# Patient Record
Sex: FEMALE | Race: WHITE | Employment: UNEMPLOYED | URBAN - METROPOLITAN AREA
[De-identification: names, ages, dates, MRNs, and addresses within clinical notes are randomized per-mention and may not be internally consistent; named-entity substitution may affect disease eponyms.]

---

## 2017-03-01 ENCOUNTER — TELEPHONE (OUTPATIENT)
Dept: ORTHOPEDIC SURGERY | Age: 67
End: 2017-03-01

## 2017-03-23 DIAGNOSIS — M25.511 CHRONIC RIGHT SHOULDER PAIN: Primary | ICD-10-CM

## 2017-03-23 DIAGNOSIS — G89.29 CHRONIC RIGHT SHOULDER PAIN: Primary | ICD-10-CM

## 2017-03-25 ENCOUNTER — OFFICE VISIT (OUTPATIENT)
Dept: ORTHOPEDIC SURGERY | Age: 67
End: 2017-03-25

## 2017-03-25 VITALS
BODY MASS INDEX: 30.16 KG/M2 | WEIGHT: 199 LBS | HEIGHT: 68 IN | DIASTOLIC BLOOD PRESSURE: 83 MMHG | SYSTOLIC BLOOD PRESSURE: 155 MMHG | HEART RATE: 69 BPM

## 2017-03-25 DIAGNOSIS — Z96.611 S/P REVERSE TOTAL SHOULDER ARTHROPLASTY, RIGHT: Primary | ICD-10-CM

## 2017-03-25 PROCEDURE — 99024 POSTOP FOLLOW-UP VISIT: CPT | Performed by: PHYSICIAN ASSISTANT

## 2017-03-31 ENCOUNTER — OFFICE VISIT (OUTPATIENT)
Dept: ORTHOPEDIC SURGERY | Age: 67
End: 2017-03-31

## 2017-03-31 VITALS — HEIGHT: 68 IN | BODY MASS INDEX: 30.17 KG/M2 | WEIGHT: 199.08 LBS

## 2017-03-31 DIAGNOSIS — Z96.611 STATUS POST REVERSE TOTAL REPLACEMENT OF RIGHT SHOULDER: ICD-10-CM

## 2017-03-31 PROCEDURE — 73030 X-RAY EXAM OF SHOULDER: CPT | Performed by: ORTHOPAEDIC SURGERY

## 2017-03-31 PROCEDURE — 99024 POSTOP FOLLOW-UP VISIT: CPT | Performed by: ORTHOPAEDIC SURGERY

## 2017-03-31 RX ORDER — CLARITHROMYCIN 500 MG/1
TABLET, COATED ORAL
Refills: 0 | COMMUNITY
Start: 2017-03-27 | End: 2021-03-03 | Stop reason: ALTCHOICE

## 2017-04-03 ENCOUNTER — HOSPITAL ENCOUNTER (OUTPATIENT)
Dept: PHYSICAL THERAPY | Age: 67
Discharge: HOME | End: 2017-04-03
Admitting: ORTHOPAEDIC SURGERY

## 2017-04-03 ENCOUNTER — HOSPITAL ENCOUNTER (OUTPATIENT)
Dept: PHYSICAL THERAPY | Age: 67
Discharge: HOME | End: 2017-03-31
Admitting: ORTHOPAEDIC SURGERY

## 2017-04-06 ENCOUNTER — HOSPITAL ENCOUNTER (OUTPATIENT)
Dept: PHYSICAL THERAPY | Age: 67
Discharge: HOME | End: 2017-04-06
Admitting: ORTHOPAEDIC SURGERY

## 2017-04-10 ENCOUNTER — HOSPITAL ENCOUNTER (OUTPATIENT)
Dept: PHYSICAL THERAPY | Age: 67
Discharge: HOME | End: 2017-04-10
Admitting: ORTHOPAEDIC SURGERY

## 2017-04-13 ENCOUNTER — HOSPITAL ENCOUNTER (OUTPATIENT)
Dept: PHYSICAL THERAPY | Age: 67
Discharge: HOME | End: 2017-04-13
Admitting: ORTHOPAEDIC SURGERY

## 2017-04-17 ENCOUNTER — HOSPITAL ENCOUNTER (OUTPATIENT)
Dept: PHYSICAL THERAPY | Age: 67
Discharge: HOME | End: 2017-04-17
Admitting: ORTHOPAEDIC SURGERY

## 2017-04-20 ENCOUNTER — HOSPITAL ENCOUNTER (OUTPATIENT)
Dept: PHYSICAL THERAPY | Age: 67
Discharge: HOME | End: 2017-04-20
Admitting: ORTHOPAEDIC SURGERY

## 2017-04-24 ENCOUNTER — HOSPITAL ENCOUNTER (OUTPATIENT)
Dept: PHYSICAL THERAPY | Age: 67
Discharge: HOME | End: 2017-04-24
Admitting: ORTHOPAEDIC SURGERY

## 2017-04-27 ENCOUNTER — HOSPITAL ENCOUNTER (OUTPATIENT)
Dept: PHYSICAL THERAPY | Age: 67
Discharge: HOME | End: 2017-04-27
Admitting: ORTHOPAEDIC SURGERY

## 2017-05-01 ENCOUNTER — HOSPITAL ENCOUNTER (OUTPATIENT)
Dept: PHYSICAL THERAPY | Age: 67
Discharge: HOME | End: 2017-05-01
Admitting: ORTHOPAEDIC SURGERY

## 2017-05-04 ENCOUNTER — HOSPITAL ENCOUNTER (OUTPATIENT)
Dept: PHYSICAL THERAPY | Age: 67
Discharge: HOME | End: 2017-05-04
Admitting: ORTHOPAEDIC SURGERY

## 2017-05-08 ENCOUNTER — HOSPITAL ENCOUNTER (OUTPATIENT)
Dept: PHYSICAL THERAPY | Age: 67
Discharge: HOME | End: 2017-05-08
Admitting: ORTHOPAEDIC SURGERY

## 2017-05-11 ENCOUNTER — HOSPITAL ENCOUNTER (OUTPATIENT)
Dept: PHYSICAL THERAPY | Age: 67
Discharge: HOME | End: 2017-05-11
Admitting: ORTHOPAEDIC SURGERY

## 2017-05-15 ENCOUNTER — HOSPITAL ENCOUNTER (OUTPATIENT)
Dept: PHYSICAL THERAPY | Age: 67
Discharge: HOME | End: 2017-05-15
Admitting: ORTHOPAEDIC SURGERY

## 2017-05-16 ENCOUNTER — OFFICE VISIT (OUTPATIENT)
Dept: ORTHOPEDIC SURGERY | Age: 67
End: 2017-05-16

## 2017-05-16 VITALS — BODY MASS INDEX: 30.17 KG/M2 | HEIGHT: 68 IN | WEIGHT: 199.08 LBS

## 2017-05-16 DIAGNOSIS — Z96.611 STATUS POST REVERSE TOTAL REPLACEMENT OF RIGHT SHOULDER: Primary | ICD-10-CM

## 2017-05-16 PROCEDURE — 99024 POSTOP FOLLOW-UP VISIT: CPT | Performed by: ORTHOPAEDIC SURGERY

## 2017-05-16 RX ORDER — TRAMADOL HYDROCHLORIDE 50 MG/1
50 TABLET ORAL EVERY 6 HOURS PRN
Qty: 30 TABLET | Refills: 0 | Status: SHIPPED | OUTPATIENT
Start: 2017-05-16 | End: 2017-05-26

## 2017-05-18 ENCOUNTER — HOSPITAL ENCOUNTER (OUTPATIENT)
Dept: PHYSICAL THERAPY | Age: 67
Discharge: HOME | End: 2017-05-18
Admitting: ORTHOPAEDIC SURGERY

## 2017-05-22 ENCOUNTER — HOSPITAL ENCOUNTER (OUTPATIENT)
Dept: PHYSICAL THERAPY | Age: 67
Discharge: HOME | End: 2017-05-22
Admitting: ORTHOPAEDIC SURGERY

## 2017-05-25 ENCOUNTER — HOSPITAL ENCOUNTER (OUTPATIENT)
Dept: PHYSICAL THERAPY | Age: 67
Discharge: HOME | End: 2017-05-25
Admitting: ORTHOPAEDIC SURGERY

## 2017-05-25 DIAGNOSIS — Z96.611 STATUS POST REVERSE TOTAL REPLACEMENT OF RIGHT SHOULDER: Primary | ICD-10-CM

## 2017-05-25 RX ORDER — AMOXICILLIN 500 MG/1
CAPSULE ORAL
Qty: 4 CAPSULE | Refills: 0 | Status: SHIPPED | OUTPATIENT
Start: 2017-05-25 | End: 2017-12-07 | Stop reason: ALTCHOICE

## 2017-05-31 ENCOUNTER — HOSPITAL ENCOUNTER (OUTPATIENT)
Dept: PHYSICAL THERAPY | Age: 67
Discharge: HOME | End: 2017-05-31
Admitting: ORTHOPAEDIC SURGERY

## 2017-06-07 ENCOUNTER — HOSPITAL ENCOUNTER (OUTPATIENT)
Dept: PHYSICAL THERAPY | Age: 67
Discharge: HOME | End: 2017-06-07
Admitting: ORTHOPAEDIC SURGERY

## 2017-06-09 ENCOUNTER — HOSPITAL ENCOUNTER (OUTPATIENT)
Dept: PHYSICAL THERAPY | Age: 67
Discharge: HOME | End: 2017-06-09
Admitting: ORTHOPAEDIC SURGERY

## 2017-06-12 ENCOUNTER — HOSPITAL ENCOUNTER (OUTPATIENT)
Dept: PHYSICAL THERAPY | Age: 67
Discharge: HOME | End: 2017-06-12
Admitting: ORTHOPAEDIC SURGERY

## 2017-06-14 ENCOUNTER — HOSPITAL ENCOUNTER (OUTPATIENT)
Dept: PHYSICAL THERAPY | Age: 67
Discharge: HOME | End: 2017-06-14
Admitting: ORTHOPAEDIC SURGERY

## 2017-07-21 ENCOUNTER — OFFICE VISIT (OUTPATIENT)
Dept: ORTHOPEDIC SURGERY | Age: 67
End: 2017-07-21

## 2017-07-21 DIAGNOSIS — M25.512 CHRONIC LEFT SHOULDER PAIN: ICD-10-CM

## 2017-07-21 DIAGNOSIS — M25.511 RIGHT SHOULDER PAIN, UNSPECIFIED CHRONICITY: Primary | ICD-10-CM

## 2017-07-21 DIAGNOSIS — G89.29 CHRONIC LEFT SHOULDER PAIN: ICD-10-CM

## 2017-07-21 DIAGNOSIS — Z96.611 STATUS POST REVERSE TOTAL REPLACEMENT OF RIGHT SHOULDER: ICD-10-CM

## 2017-07-21 DIAGNOSIS — M25.512 LEFT SHOULDER PAIN, UNSPECIFIED CHRONICITY: ICD-10-CM

## 2017-07-21 PROCEDURE — 1123F ACP DISCUSS/DSCN MKR DOCD: CPT | Performed by: PHYSICIAN ASSISTANT

## 2017-07-21 PROCEDURE — 3017F COLORECTAL CA SCREEN DOC REV: CPT | Performed by: PHYSICIAN ASSISTANT

## 2017-07-21 PROCEDURE — 4040F PNEUMOC VAC/ADMIN/RCVD: CPT | Performed by: PHYSICIAN ASSISTANT

## 2017-07-21 PROCEDURE — 20610 DRAIN/INJ JOINT/BURSA W/O US: CPT | Performed by: PHYSICIAN ASSISTANT

## 2017-07-21 PROCEDURE — G8400 PT W/DXA NO RESULTS DOC: HCPCS | Performed by: PHYSICIAN ASSISTANT

## 2017-07-21 PROCEDURE — 73030 X-RAY EXAM OF SHOULDER: CPT | Performed by: PHYSICIAN ASSISTANT

## 2017-07-21 PROCEDURE — 99213 OFFICE O/P EST LOW 20 MIN: CPT | Performed by: PHYSICIAN ASSISTANT

## 2017-07-21 PROCEDURE — 1036F TOBACCO NON-USER: CPT | Performed by: PHYSICIAN ASSISTANT

## 2017-07-21 PROCEDURE — G8427 DOCREV CUR MEDS BY ELIG CLIN: HCPCS | Performed by: PHYSICIAN ASSISTANT

## 2017-07-21 PROCEDURE — 3014F SCREEN MAMMO DOC REV: CPT | Performed by: PHYSICIAN ASSISTANT

## 2017-07-21 PROCEDURE — 1090F PRES/ABSN URINE INCON ASSESS: CPT | Performed by: PHYSICIAN ASSISTANT

## 2017-07-21 PROCEDURE — G8417 CALC BMI ABV UP PARAM F/U: HCPCS | Performed by: PHYSICIAN ASSISTANT

## 2017-11-28 ENCOUNTER — OFFICE VISIT (OUTPATIENT)
Dept: ORTHOPEDIC SURGERY | Age: 67
End: 2017-11-28

## 2017-11-28 VITALS — WEIGHT: 199.08 LBS | HEIGHT: 68 IN | BODY MASS INDEX: 30.17 KG/M2

## 2017-11-28 DIAGNOSIS — Z96.611 STATUS POST REVERSE TOTAL REPLACEMENT OF RIGHT SHOULDER: Primary | ICD-10-CM

## 2017-11-28 DIAGNOSIS — M19.019 SHOULDER ARTHRITIS: ICD-10-CM

## 2017-11-28 PROCEDURE — 99213 OFFICE O/P EST LOW 20 MIN: CPT | Performed by: ORTHOPAEDIC SURGERY

## 2017-11-28 PROCEDURE — 1123F ACP DISCUSS/DSCN MKR DOCD: CPT | Performed by: ORTHOPAEDIC SURGERY

## 2017-11-28 PROCEDURE — G8417 CALC BMI ABV UP PARAM F/U: HCPCS | Performed by: ORTHOPAEDIC SURGERY

## 2017-11-28 PROCEDURE — 4040F PNEUMOC VAC/ADMIN/RCVD: CPT | Performed by: ORTHOPAEDIC SURGERY

## 2017-11-28 PROCEDURE — 1090F PRES/ABSN URINE INCON ASSESS: CPT | Performed by: ORTHOPAEDIC SURGERY

## 2017-11-28 PROCEDURE — G8484 FLU IMMUNIZE NO ADMIN: HCPCS | Performed by: ORTHOPAEDIC SURGERY

## 2017-11-28 PROCEDURE — 3014F SCREEN MAMMO DOC REV: CPT | Performed by: ORTHOPAEDIC SURGERY

## 2017-11-28 PROCEDURE — G8427 DOCREV CUR MEDS BY ELIG CLIN: HCPCS | Performed by: ORTHOPAEDIC SURGERY

## 2017-11-28 PROCEDURE — G8400 PT W/DXA NO RESULTS DOC: HCPCS | Performed by: ORTHOPAEDIC SURGERY

## 2017-11-28 PROCEDURE — 1036F TOBACCO NON-USER: CPT | Performed by: ORTHOPAEDIC SURGERY

## 2017-11-28 PROCEDURE — 3017F COLORECTAL CA SCREEN DOC REV: CPT | Performed by: ORTHOPAEDIC SURGERY

## 2017-11-28 PROCEDURE — 20610 DRAIN/INJ JOINT/BURSA W/O US: CPT | Performed by: ORTHOPAEDIC SURGERY

## 2017-11-28 RX ORDER — CLINDAMYCIN HYDROCHLORIDE 300 MG/1
CAPSULE ORAL
Qty: 2 CAPSULE | Refills: 0 | Status: SHIPPED | OUTPATIENT
Start: 2017-11-28 | End: 2021-03-03 | Stop reason: ALTCHOICE

## 2017-11-28 NOTE — PROGRESS NOTES
HISTORY OF PRESENT ILLNESS: The patient returns today for evaluation of her left shoulder. She has known glenohumeral arthritis and has had improvement in the past following corticosteroid injections. He would like another injection as she is scheduled for a knee replacement with Dr. João Duran in December. REVIEW OF SYSTEMS: Pertinent items are noted in the HPI. Review of symptoms reviewed from the Patient History Form and dated on 11/28/19 are available in the patient's chart under the Media tab. PHYSICAL EXAMINATION: Inspection of the affected left shoulder reveals warm, dry, intact skin. Previous portal sites from her rotator cuff repair are healed. There is no adenopathy. The distal neurovascular exam is grossly intact. There is no glenohumeral instability. She has 135° of comfortable active forward elevation. There is some mild glenohumeral crepitance. She has 5 minus out of 5 forward elevation and external rotation strength. There is no glenohumeral instability. Examination of the contralateral shoulder reveals no atrophy or deformity. The skin is warm and dry. The incision from her reverse shoulder arthroplasty is well-healed. She has 145° of active forward elevation. He has 5 minus out of 5 forward elevation and 4+ out of 5 external rotation strength. The distal neurovascular exam is grossly intact. Cervical spine: The skin is warm and dry. There is no swelling, warmth, or erythema. Range of motion is within normal limits. There is no paraspinal or spinous process tenderness. Spurling's sign is negative and did not produce shoulder pain. The distal neurovascular exam is grossly intact. X-RAYS: 4 views left shoulder obtained in the office and reviewed today. There is evidence of glenohumeral arthritis. There is evidence of a previous rotator cuff repair with acromioplasty.     ASSESSMENT:  Left shoulder glenohumeral arthritis    PROCEDURE:  After informed consent was provided, the posterior aspect of the left shoulder was prepped with Betadine and alcohol. The skin was prepped with Betadine and alcohol. A 25 gauge needle was inserted into the glenohumeral joint. Intraarticular placement was confirmed with gentle humeral rotation. A mixture of 4 mL of 1% lidocaine, 4 mL of 0.25% marcaine and 2 mL of celestone was injected. The needle was withdrawn and the puncture site sealed with a Band-Aid. The patient tolerated the procedure well. PLAN:  Hopefully the injection will improve her symptoms. She'll modify her activities appropriately. I'll reevaluate her at her request.  She understands she may eventually benefit from shoulder arthroplasty. She agreed with our plan.

## 2017-12-07 RX ORDER — AMOXICILLIN 500 MG/1
CAPSULE ORAL
Qty: 8 CAPSULE | Refills: 0 | Status: SHIPPED | OUTPATIENT
Start: 2017-12-07 | End: 2021-08-23

## 2018-02-01 ENCOUNTER — HOSPITAL ENCOUNTER (OUTPATIENT)
Dept: PHYSICAL THERAPY | Age: 68
Discharge: HOME | End: 2018-02-28
Attending: ORTHOPAEDIC SURGERY | Admitting: ORTHOPAEDIC SURGERY

## 2018-02-02 ENCOUNTER — HOSPITAL ENCOUNTER (OUTPATIENT)
Dept: PHYSICAL THERAPY | Age: 68
Discharge: HOME | End: 2018-02-03
Admitting: ORTHOPAEDIC SURGERY

## 2018-02-02 ENCOUNTER — HOSPITAL ENCOUNTER (OUTPATIENT)
Dept: PHYSICAL THERAPY | Age: 68
Discharge: HOME | End: 2018-01-31
Admitting: ORTHOPAEDIC SURGERY

## 2018-02-05 ENCOUNTER — HOSPITAL ENCOUNTER (OUTPATIENT)
Dept: PHYSICAL THERAPY | Age: 68
Discharge: HOME | End: 2018-02-06
Admitting: ORTHOPAEDIC SURGERY

## 2018-02-05 NOTE — FLOWSHEET NOTE
Rick , [facility]    Physical Therapy Daily Treatment Note  Date:  2018    Patient Name:  Kirill Martínez    :  1950  MRN: [id number]  Restrictions/Precautions:    Medical/Treatment Diagnosis Information:  · Diagnosis: Primary OA of R knee (M17.11)  · Treatment Diagnosis: M17.11 (Primary OA of R knee)  Insurance/Certification information:  PT Insurance Information: Medicare  Physician Information:  Referring Practitioner: Dr. Sharmila Timmons of care signed (Y/N):     Date of Patient follow up with Physician:     G-Code (if applicable):      Date G-Code Applied:         Progress Note: [x]  Yes  []  No  Next due by: Visit #10       Latex Allergy:  [x]NO      []YES  Preferred Language for Healthcare:   [x]English       []other:    Visit # Insurance Allowable   2 MN (medicare)     Pain level:  2/10 at rest      SUBJECTIVE:  Pt states knee feels stiff and swollen    OBJECTIVE:   Observation:   Test measurements:  PROM R knee 0-105    RESTRICTIONS/PRECAUTIONS: none    Exercises/Interventions:     Therapeutic Ex 28' Resistance Sets/sec Reps Notes   Retro Stepper/BIKE  6'     Quad sets  5\" 20    3 way SLR  3 10 flexion   SAQ  3 10    Clam ABD       Heel slides   10\" 10    Bosu fwd/side lunge       Slide Lunge       Leg Press Ecc 0-        HS curl orange 2 10    TKE       Glute side walks       BOSU squat       Calf stretch  30\" 3                  Manual Intervention 12'       Knee mobs/PROM  10'     Tib/Fem Mobs       Patella Mobs  2'     Ankle mobs                     NMR re-education       Jamaican/Biofeedback 10/10       G. Med activaiton/sidelying       G. Max Activation/prone       Hip Ext full ROM G.  Activation       Bosu Bal and Prop- G Med       Single leg stance/Balance/Prop       HR/TR       Tandem balance  1' 3               Therapeutic Exercise and NMR EXR  [x] (69556) Provided verbal/tactile cueing for activities related to strengthening, of care initiated [] Hold pending MD visit [] Discharge    Electronically signed by: Víctor Harman PTA

## 2018-02-07 ENCOUNTER — HOSPITAL ENCOUNTER (OUTPATIENT)
Dept: PHYSICAL THERAPY | Age: 68
Discharge: HOME | End: 2018-02-08
Admitting: ORTHOPAEDIC SURGERY

## 2018-02-07 NOTE — FLOWSHEET NOTE
Rick [address], [facility]    Physical Therapy Daily Treatment Note  Date:  2018    Patient Name:  Sigifredo Lilly    :  1950  MRN: [id number]  Restrictions/Precautions:    Medical/Treatment Diagnosis Information:  · Diagnosis: Primary OA of R knee (M17.11)  · Treatment Diagnosis: M17.11 (Primary OA of R knee)  Insurance/Certification information:  PT Insurance Information: Medicare  Physician Information:  Referring Practitioner: Dr. Negro Gustafson of care signed (Y/N):     Date of Patient follow up with Physician:     G-Code (if applicable):      Date G-Code Applied:         Progress Note: [x]  Yes  []  No  Next due by: Visit #10       Latex Allergy:  [x]NO      []YES  Preferred Language for Healthcare:   [x]English       []other:    Visit # Insurance Allowable   3 [place] (medicare)     Pain level:  2/10 at rest      SUBJECTIVE:  Pt states knee continues to have moments when it feels really good and flexible, then other times where if feels more stiff and swollen    OBJECTIVE:   Observation:   Test measurements:  PROM R knee 0-107    RESTRICTIONS/PRECAUTIONS: none    Exercises/Interventions:     Therapeutic Ex 33' Resistance Sets/sec Reps Notes   Retro Stepper/BIKE  6'  3/4 revolutions   Quad sets  5\" 20    3 way SLR  3 10 Flexion/abd   SAQ  3 10    Clam ABD orange 2 10 hooklying   Heel slides   10\" 10    SB rolls       Slide Lunge       Leg Press Ecc 0-        HS curl/LAQ orange 2 10    TKE       Glute side walks       BOSU squat       Calf stretch  30\" 3                  Manual Intervention 12'       Knee mobs/PROM  10'     Tib/Fem Mobs       Patella Mobs  2'     Ankle mobs                     NMR re-education       Filipino/Biofeedback 10/10       G. Med activaiton/sidelying       G. Max Activation/prone       Hip Ext full ROM G.  Activation       Bosu Bal and Prop- G Med       Single leg stance/Balance/Prop       HR/TR       Tandem balance  1' 3 Therapeutic Exercise and NMR EXR  [x] (50322) Provided verbal/tactile cueing for activities related to strengthening, flexibility, endurance, ROM for improvements in LE, proximal hip, and core control with self care, mobility, lifting, ambulation.  [] (84742) Provided verbal/tactile cueing for activities related to improving balance, coordination, kinesthetic sense, posture, motor skill, proprioception  to assist with LE, proximal hip, and core control in self care, mobility, lifting, ambulation and eccentric single leg control.      NMR and Therapeutic Activities:    [] (58720 or 36293) Provided verbal/tactile cueing for activities related to improving balance, coordination, kinesthetic sense, posture, motor skill, proprioception and motor activation to allow for proper function of core, proximal hip and LE with self care and ADLs  [] (32355) Gait Re-education- Provided training and instruction to the patient for proper LE, core and proximal hip recruitment and positioning and eccentric body weight control with ambulation re-education including up and down stairs     Home Exercise Program:    [x] (20630) Reviewed/Progressed HEP activities related to strengthening, flexibility, endurance, ROM of core, proximal hip and LE for functional self-care, mobility, lifting and ambulation/stair navigation   [] (00722)Reviewed/Progressed HEP activities related to improving balance, coordination, kinesthetic sense, posture, motor skill, proprioception of core, proximal hip and LE for self care, mobility, lifting, and ambulation/stair navigation      Manual Treatments:  PROM / STM / Oscillations-Mobs:  G-I, II, III, IV (PA's, Inf., Post.)  [] (32345) Provided manual therapy to mobilize LE, proximal hip and/or LS spine soft tissue/joints for the purpose of modulating pain, promoting relaxation,  increasing ROM, reducing/eliminating soft tissue swelling/inflammation/restriction, improving soft tissue extensibility and

## 2018-02-12 ENCOUNTER — HOSPITAL ENCOUNTER (OUTPATIENT)
Dept: PHYSICAL THERAPY | Age: 68
Discharge: HOME | End: 2018-02-13
Admitting: ORTHOPAEDIC SURGERY

## 2018-02-12 NOTE — FLOWSHEET NOTE
for normal function with self care, mobility, lifting and ambulation. Modalities:  Trial of HV/CP x 15'    Charges:  Timed Code Treatment Minutes: 45   Total Treatment Minutes: 60     [] EVAL  [x] DQ(71628) x  2   [] IONTO  [] NMR (37597) x      [] VASO  [x] Manual (95299) x  1    [] Other:  [] TA x       [] Mech Traction (64089)  [] ES(attended) (05371)      [x] ES (un) (28048):     GOALS:   Patient stated goal: \"back to normal activities\"    Therapist goals for Patient:   Short Term Goals: To be achieved in: 2 weeks  1. Independent in HEP and progression per patient tolerance, in order to prevent re-injury. 2. Patient will have a decrease in pain to facilitate improvement in movement, function, and ADLs as indicated by Functional Deficits. Long Term Goals: To be achieved in: 12 weeks  1. Disability index score of 25% or less for the LEFS to assist with reaching prior level of function. 2. Patient will demonstrate increased AROM to WNL to allow for proper joint functioning as indicated by patients Functional Deficits. 3. Patient will demonstrate an increase in Strength to WNL to allow for proper functional mobility as indicated by patients Functional Deficits. 4. Patient will return to all functional activities without increased symptoms or restriction. 5. Patient will be able to walk 1 mile without AD without pain in R knee. Progression Towards Functional goals:  [x] Patient is progressing as expected towards functional goals listed. [] Progression is slowed due to complexities listed. [] Progression has been slowed due to co-morbidities. [] Plan just implemented, too soon to assess goals progression  [] Other:     ASSESSMENT: Pt encouraged to decrease amount of walking activity at home d/t increase in soreness/swelling with prolonged ambulation.       Treatment/Activity Tolerance:  [x] Patient tolerated treatment well [] Patient limited by fatique  [] Patient limited by pain  [] Patient limited by other medical complications  [] Other:     Prognosis: [x] Good [] Fair  [] Poor    Patient Requires Follow-up: [x] Yes  [] No    PLAN: Progress patients ROM , strength and function as tolerated by patient  [x] Continue per plan of care [] Alter current plan (see comments)  [] Plan of care initiated [] Hold pending MD visit [] Discharge    Electronically signed by: Michael Mcadams PTA

## 2018-02-14 ENCOUNTER — HOSPITAL ENCOUNTER (OUTPATIENT)
Dept: PHYSICAL THERAPY | Age: 68
Discharge: HOME | End: 2018-02-15
Admitting: ORTHOPAEDIC SURGERY

## 2018-02-14 NOTE — FLOWSHEET NOTE
self care, mobility, lifting and ambulation. Modalities:  Trial of HV/CP x 15'    Charges:  Timed Code Treatment Minutes: 45   Total Treatment Minutes: 60     [] EVAL  [x] QN(03204) x  2   [] IONTO  [] NMR (63729) x      [] VASO  [x] Manual (41994) x  1    [] Other:  [] TA x       [] Mech Traction (14527)  [] ES(attended) (51139)      [x] ES (un) (89087):     GOALS:   Patient stated goal: \"back to normal activities\"    Therapist goals for Patient:   Short Term Goals: To be achieved in: 2 weeks  1. Independent in HEP and progression per patient tolerance, in order to prevent re-injury. 2. Patient will have a decrease in pain to facilitate improvement in movement, function, and ADLs as indicated by Functional Deficits. Long Term Goals: To be achieved in: 12 weeks  1. Disability index score of 25% or less for the LEFS to assist with reaching prior level of function. 2. Patient will demonstrate increased AROM to WNL to allow for proper joint functioning as indicated by patients Functional Deficits. 3. Patient will demonstrate an increase in Strength to WNL to allow for proper functional mobility as indicated by patients Functional Deficits. 4. Patient will return to all functional activities without increased symptoms or restriction. 5. Patient will be able to walk 1 mile without AD without pain in R knee. Progression Towards Functional goals:  [x] Patient is progressing as expected towards functional goals listed. [] Progression is slowed due to complexities listed. [] Progression has been slowed due to co-morbidities. [] Plan just implemented, too soon to assess goals progression  [] Other:     ASSESSMENT: Pt encouraged to decrease amount of walking activity at home d/t increase in soreness/swelling with prolonged ambulation.       Treatment/Activity Tolerance:  [x] Patient tolerated treatment well [] Patient limited by fatique  [] Patient limited by pain  [] Patient limited by other medical

## 2018-02-16 ENCOUNTER — HOSPITAL ENCOUNTER (OUTPATIENT)
Dept: PHYSICAL THERAPY | Age: 68
Discharge: HOME | End: 2018-02-17
Admitting: ORTHOPAEDIC SURGERY

## 2018-02-16 NOTE — FLOWSHEET NOTE
ambulation. Treatment/Activity Tolerance:  [x] Patient tolerated treatment well [] Patient limited by fatique  [] Patient limited by pain  [] Patient limited by other medical complications  [] Other:     Prognosis: [x] Good [] Fair  [] Poor    Patient Requires Follow-up: [x] Yes  [] No    PLAN: Progress patients ROM , strength and function as tolerated by patient  [x] Continue per plan of care [] Alter current plan (see comments)  [] Plan of care initiated [] Hold pending MD visit [] Discharge    Electronically signed by:  Julisa Marie DPT

## 2018-02-21 ENCOUNTER — HOSPITAL ENCOUNTER (OUTPATIENT)
Dept: PHYSICAL THERAPY | Age: 68
Discharge: HOME | End: 2018-02-22
Admitting: ORTHOPAEDIC SURGERY

## 2018-02-21 NOTE — FLOWSHEET NOTE
Rick [address], [facility]    Physical Therapy Daily Treatment Note  Date:  2018    Patient Name:  Alysa Anaya    :  1950  MRN: [id number]  Restrictions/Precautions:    Medical/Treatment Diagnosis Information:  · Diagnosis: Primary OA of R knee (M17.11)  · Treatment Diagnosis: M17.11 (Primary OA of R knee)  Insurance/Certification information:  PT Insurance Information: Medicare  Physician Information:  Referring Practitioner: Dr. Maria M Naik of care signed (Y/N):     Date of Patient follow up with Physician:     G-Code (if applicable):      Date G-Code Applied:         Progress Note: [x]  Yes  []  No  Next due by: Visit #10       Latex Allergy:  [x]NO      []YES  Preferred Language for Healthcare:   [x]English       []other:    Visit # Insurance Allowable   8 MN (medicare)     Pain level:  5-6/10 at its worst     SUBJECTIVE: Pt states knee remains stiff, frustrated with lack of knee flexion improvement    OBJECTIVE:   Observation:   Test measurements:  PROM R knee 0-110 18; wall slide                 RESTRICTIONS/PRECAUTIONS: none    Exercises/Interventions:     Therapeutic Ex 30' Resistance Sets/sec Reps Notes   Retro Stepper/BIKE  6'  full revolutions   Quad sets  5\" 20    3 way SLR  3 10 Flexion/abd   SAQ  3 10    Clam ABD orange 2 10    wall slides   10\" 10    Standing hip ext  2 10    Slide Lunge       Leg Press Ecc 0-        HS curl/LAQ teal 3 10    TKE       Glute side walks       BOSU squat       Calf stretch  30\" 3                  Manual Intervention 15'       Knee mobs/PROM  10'     Tib/Fem Mobs       Patella Mobs  5'     Ankle mobs                     NMR re-education       Croatian/Biofeedback 10/10       G.  Med activaiton/standing  2 10    4\" step up  2 10 Forward and lateral   Mini squats  2 10    Bosu Bal and Prop- G Med       Single leg stance/Balance/Prop floor 30 3    HR/TR       Tandem balance  1' 3 allowing for proper ROM for normal function with self care, mobility, lifting and ambulation. Modalities:  HV/CP x 15'    Charges:  Timed Code Treatment Minutes: 55   Total Treatment Minutes: 70     [] EVAL  [x] EI(24352) x  2   [] IONTO  [x] NMR (07213) x  1   [] VASO  [x] Manual (26204) x  1    [] Other:  [] TA x       [] Mech Traction (23294)  [] ES(attended) (17487)      [x] ES (un) (75361):     GOALS:   Patient stated goal: \"back to normal activities\"    Therapist goals for Patient:   Short Term Goals: To be achieved in: 2 weeks  1. Independent in HEP and progression per patient tolerance, in order to prevent re-injury. 2. Patient will have a decrease in pain to facilitate improvement in movement, function, and ADLs as indicated by Functional Deficits. Long Term Goals: To be achieved in: 12 weeks  1. Disability index score of 25% or less for the LEFS to assist with reaching prior level of function. 2. Patient will demonstrate increased AROM to WNL to allow for proper joint functioning as indicated by patients Functional Deficits. 3. Patient will demonstrate an increase in Strength to WNL to allow for proper functional mobility as indicated by patients Functional Deficits. 4. Patient will return to all functional activities without increased symptoms or restriction. 5. Patient will be able to walk 1 mile without AD without pain in R knee. Progression Towards Functional goals:  [x] Patient is progressing as expected towards functional goals listed. [] Progression is slowed due to complexities listed. [] Progression has been slowed due to co-morbidities. [] Plan just implemented, too soon to assess goals progression  [] Other:     ASSESSMENT: Pt encouraged to decrease amount of walking activity at home d/t increase in soreness/swelling with prolonged ambulation.       Treatment/Activity Tolerance:  [x] Patient tolerated treatment well [] Patient limited by darrick  [] Patient limited by

## 2018-02-26 ENCOUNTER — HOSPITAL ENCOUNTER (OUTPATIENT)
Dept: PHYSICAL THERAPY | Age: 68
Discharge: HOME | End: 2018-02-27
Admitting: ORTHOPAEDIC SURGERY

## 2018-02-28 ENCOUNTER — HOSPITAL ENCOUNTER (OUTPATIENT)
Dept: PHYSICAL THERAPY | Age: 68
Discharge: HOME | End: 2018-03-01
Admitting: ORTHOPAEDIC SURGERY

## 2018-02-28 NOTE — FLOWSHEET NOTE
Rick [address], [facility]    Physical Therapy Daily Treatment Note  Date:  2018    Patient Name:  Pietro Leigh    :  1950  MRN: [id number]  Restrictions/Precautions:    Medical/Treatment Diagnosis Information:  · Diagnosis: Primary OA of R knee (M17.11)  · Treatment Diagnosis: M17.11 (Primary OA of R knee)  Insurance/Certification information:  PT Insurance Information: Medicare  Physician Information:  Referring Practitioner: Dr. Som Norris of care signed (Y/N):     Date of Patient follow up with Physician:     G-Code (if applicable):      Date G-Code Applied:         Progress Note: [x]  Yes  []  No  Next due by: Visit #10       Latex Allergy:  [x]NO      []YES  Preferred Language for Healthcare:   [x]English       []other:    Visit # Insurance Allowable   10 MN (medicare)     Pain level:  4/10 at its worst     SUBJECTIVE: Pt states she is driving and running errands with minimal difficulty. Frustrated with how stiff knee continues to feel     OBJECTIVE:   Observation:   Test measurements:  PROM R knee 0-115  18; wall slide with overpressure                 RESTRICTIONS/PRECAUTIONS: none    Exercises/Interventions:     Therapeutic Ex 23' Resistance Sets/sec Reps Notes   Retro Stepper/BIKE  6'  full revolutions  Half revolutions   Quad sets  5\" 20    3 way SLR  3 10 Flexion/abd   SAQ  3 10 hold   Clam ABD maroon 2 10    wall slides   10\" 10    Bridging  2 10 SB   Slide Lunge       Leg Press Ecc 0-        HS curl/LAQ maroon 3 10    TKE 35# 2 10    Glute side walks       BOSU squat       Calf stretch  30\" 3                  Manual Intervention 15'       Knee mobs/PROM  10'  flex   Tib/Fem Mobs       Patella Mobs  5'     Ankle mobs                     NMR re-education  7'       Togolese/Biofeedback 10/10       G.  Med activaiton/standing  2 10    4\" step up  2 10 Forward and lateral   Mini squats  2 10    Bosu Bal and Prop- G Med tissue swelling/inflammation/restriction, improving soft tissue extensibility and allowing for proper ROM for normal function with self care, mobility, lifting and ambulation. Modalities:  HV/CP x 15'    Charges:  Timed Code Treatment Minutes: 45   Total Treatment Minutes: 60     [] EVAL  [x] QH(45102) x  2   [] IONTO  [] NMR (84102) x      [] VASO  [x] Manual (19979) x  1    [] Other:  [] TA x       [] Mech Traction (67959)  [] ES(attended) (53805)      [x] ES (un) (45072):     GOALS:   Patient stated goal: \"back to normal activities\"    Therapist goals for Patient:   Short Term Goals: To be achieved in: 2 weeks  1. Independent in HEP and progression per patient tolerance, in order to prevent re-injury. 2. Patient will have a decrease in pain to facilitate improvement in movement, function, and ADLs as indicated by Functional Deficits. Long Term Goals: To be achieved in: 12 weeks  1. Disability index score of 25% or less for the LEFS to assist with reaching prior level of function. 2. Patient will demonstrate increased AROM to WNL to allow for proper joint functioning as indicated by patients Functional Deficits. 3. Patient will demonstrate an increase in Strength to WNL to allow for proper functional mobility as indicated by patients Functional Deficits. 4. Patient will return to all functional activities without increased symptoms or restriction. 5. Patient will be able to walk 1 mile without AD without pain in R knee. Progression Towards Functional goals:  [x] Patient is progressing as expected towards functional goals listed. [] Progression is slowed due to complexities listed. [] Progression has been slowed due to co-morbidities. [] Plan just implemented, too soon to assess goals progression  [] Other:     ASSESSMENT: Pt encouraged to decrease amount of walking activity at home d/t increase in soreness/swelling with prolonged ambulation.       Treatment/Activity Tolerance:  [x] Patient

## 2018-03-01 ENCOUNTER — HOSPITAL ENCOUNTER (OUTPATIENT)
Dept: PHYSICAL THERAPY | Age: 68
Discharge: HOME | End: 2018-03-31
Attending: ORTHOPAEDIC SURGERY | Admitting: ORTHOPAEDIC SURGERY

## 2018-03-05 ENCOUNTER — HOSPITAL ENCOUNTER (OUTPATIENT)
Dept: PHYSICAL THERAPY | Age: 68
Discharge: HOME | End: 2018-03-06
Admitting: ORTHOPAEDIC SURGERY

## 2018-03-09 ENCOUNTER — HOSPITAL ENCOUNTER (OUTPATIENT)
Dept: PHYSICAL THERAPY | Age: 68
Discharge: HOME | End: 2018-03-10
Admitting: ORTHOPAEDIC SURGERY

## 2018-03-09 NOTE — FLOWSHEET NOTE
improvements    Plan: Continue to increase R knee ROM and strength in order to safely negotiate stairs. Electronically signed by: Jannet Barney, PT       Physician Recommendations:  [] Follow treatment plan as above [] Discontinue physical therapy  [] Change plan to: _______________________________________________________________    Physical Therapy Daily Treatment Note  Date:  3/9/2018    Patient Name:  Isidoro Knee    :  1950  MRN: [id number]  Restrictions/Precautions:    Medical/Treatment Diagnosis Information:  · Diagnosis: Primary OA of R knee (M17.11)  · Treatment Diagnosis: M17.11 (Primary OA of R knee)  Insurance/Certification information:  PT Insurance Information: Medicare  Physician Information:  Referring Practitioner: Dr. Margarita Churchill of care signed (Y/N):     Date of Patient follow up with Physician:     G-Code (if applicable):      Date G-Code Applied:         Progress Note: [x]  Yes  []  No  Next due by: Visit #10       Latex Allergy:  [x]NO      []YES  Preferred Language for Healthcare:   [x]English       []other:    Visit # Insurance Allowable   11 MN (medicare)     Pain level:  4/10 at its worst     SUBJECTIVE:  Ppt reports she doesn't feel like she is gaining ROM with stretching at home.      OBJECTIVE:   Observation:   Test measurements:  PROM R knee 0-110  18; wall slide with overpressure                 RESTRICTIONS/PRECAUTIONS: none    Exercises/Interventions:     Therapeutic Ex 15' Resistance Sets/sec Reps Notes   Retro Stepper/BIKE  6'  full revolutions  Half revolutions   Quad sets  5\" 20    3 way SLR  3 10 Flexion/abd   SAQ  3 10 hold   Clam ABD maroon 2 10    wall slides   10\" 10    Bridging  2 10 SB   Prone quad stretch  30 3    Leg Press Ecc 0- 35# 2 10 More for ROM    HS curl/LAQ 20# 2 10    TKE 35# 2 10    Glute side walks       BOSU squat       Calf stretch  30\" 3                  Manual Intervention 25'       Knee mobs/STM  25'  Flex w/ estim   Tib/Fem Mobs       Patella Mobs  5'     Ankle mobs                     NMR re-education  7'       South African/Biofeedback 10/10       G. Med activaiton/standing  2 10    4\" step up  2 10 Forward and lateral   Mini squats  2 10    Bosu Bal and Prop- G Med       Single leg stance/Balance/Prop foam 30 3    HR/TR       Tandem balance  1' 3               Therapeutic Exercise and NMR EXR  [x] (68483) Provided verbal/tactile cueing for activities related to strengthening, flexibility, endurance, ROM for improvements in LE, proximal hip, and core control with self care, mobility, lifting, ambulation.  [] (09074) Provided verbal/tactile cueing for activities related to improving balance, coordination, kinesthetic sense, posture, motor skill, proprioception  to assist with LE, proximal hip, and core control in self care, mobility, lifting, ambulation and eccentric single leg control.      NMR and Therapeutic Activities:    [] (60919 or 76202) Provided verbal/tactile cueing for activities related to improving balance, coordination, kinesthetic sense, posture, motor skill, proprioception and motor activation to allow for proper function of core, proximal hip and LE with self care and ADLs  [] (08274) Gait Re-education- Provided training and instruction to the patient for proper LE, core and proximal hip recruitment and positioning and eccentric body weight control with ambulation re-education including up and down stairs     Home Exercise Program:    [x] (84705) Reviewed/Progressed HEP activities related to strengthening, flexibility, endurance, ROM of core, proximal hip and LE for functional self-care, mobility, lifting and ambulation/stair navigation   [] (45185)Reviewed/Progressed HEP activities related to improving balance, coordination, kinesthetic sense, posture, motor skill, proprioception of core, proximal hip and LE for self care, mobility, lifting, and ambulation/stair navigation      Manual Treatments:  PROM / STM / implemented, too soon to assess goals progression  [] Other:     ASSESSMENT: Pt encouraged to find a stationary bike, continue patellar, scar mobilization mobs  Treatment/Activity Tolerance:  [x] Patient tolerated treatment well [] Patient limited by fatique  [] Patient limited by pain  [] Patient limited by other medical complications  [] Other:     Prognosis: [x] Good [] Fair  [] Poor    Patient Requires Follow-up: [x] Yes  [] No    PLAN: Progress patients ROM , strength and function as tolerated by patient. [x] Continue per plan of care [] Alter current plan (see comments)  [] Plan of care initiated [] Hold pending MD visit [] Discharge    Electronically signed by:  Enrike Guerra DPT

## 2018-03-14 ENCOUNTER — HOSPITAL ENCOUNTER (OUTPATIENT)
Dept: PHYSICAL THERAPY | Age: 68
Discharge: HOME | End: 2018-03-15
Admitting: ORTHOPAEDIC SURGERY

## 2018-03-14 NOTE — FLOWSHEET NOTE
stance/Balance/Prop foam 30 3    HR/TR       Tandem balance  1' 3               Therapeutic Exercise and NMR EXR  [x] (32942) Provided verbal/tactile cueing for activities related to strengthening, flexibility, endurance, ROM for improvements in LE, proximal hip, and core control with self care, mobility, lifting, ambulation.  [] (75965) Provided verbal/tactile cueing for activities related to improving balance, coordination, kinesthetic sense, posture, motor skill, proprioception  to assist with LE, proximal hip, and core control in self care, mobility, lifting, ambulation and eccentric single leg control.      NMR and Therapeutic Activities:    [] (91452 or 78657) Provided verbal/tactile cueing for activities related to improving balance, coordination, kinesthetic sense, posture, motor skill, proprioception and motor activation to allow for proper function of core, proximal hip and LE with self care and ADLs  [] (69761) Gait Re-education- Provided training and instruction to the patient for proper LE, core and proximal hip recruitment and positioning and eccentric body weight control with ambulation re-education including up and down stairs     Home Exercise Program:    [x] (52086) Reviewed/Progressed HEP activities related to strengthening, flexibility, endurance, ROM of core, proximal hip and LE for functional self-care, mobility, lifting and ambulation/stair navigation   [] (02058)Reviewed/Progressed HEP activities related to improving balance, coordination, kinesthetic sense, posture, motor skill, proprioception of core, proximal hip and LE for self care, mobility, lifting, and ambulation/stair navigation      Manual Treatments:  PROM / STM / Oscillations-Mobs:  G-I, II, III, IV (PA's, Inf., Post.)  [] (48296) Provided manual therapy to mobilize LE, proximal hip and/or LS spine soft tissue/joints for the purpose of modulating pain, promoting relaxation,  increasing ROM, reducing/eliminating soft tissue darrick  [] Patient limited by pain  [] Patient limited by other medical complications  [] Other:     Prognosis: [x] Good [] Fair  [] Poor    Patient Requires Follow-up: [x] Yes  [] No    PLAN: Progress patients ROM , strength and function as tolerated by patient.     [x] Continue per plan of care [] Alter current plan (see comments)  [] Plan of care initiated [] Hold pending MD visit [] Discharge    Electronically signed by: Masoud Gaspar PTA

## 2018-03-16 ENCOUNTER — HOSPITAL ENCOUNTER (OUTPATIENT)
Dept: PHYSICAL THERAPY | Age: 68
Discharge: HOME | End: 2018-03-17
Admitting: ORTHOPAEDIC SURGERY

## 2018-03-16 NOTE — FLOWSHEET NOTE
[address], [facility]          Physical Therapy Daily Treatment Note  Date:  3/16/2018    Patient Name:  Meredith Henry    :  1950  MRN: [id number]  Restrictions/Precautions:    Medical/Treatment Diagnosis Information:  · Diagnosis: Primary OA of R knee (M17.11)  · Treatment Diagnosis: M17.11 (Primary OA of R knee)  Insurance/Certification information:  PT Insurance Information: Medicare  Physician Information:  Referring Practitioner: Dr. Tita Thakur of care signed (Y/N):     Date of Patient follow up with Physician:     G-Code (if applicable):      Date G-Code Applied:         Progress Note: [x]  Yes-  or 20 visit  []  No  Next due by: Visit #10       Latex Allergy:  [x]NO      []YES  Preferred Language for Healthcare:   [x]English       []other:    Visit # Insurance Allowable   13 MN (medicare)     Pain level:  4/10 at its worst     SUBJECTIVE:  Pt reports noticing slow gains in knee ROM, pt reports greater ease with knee flexion. Pt reports difficulty with negotiating stairs  OBJECTIVE:   Observation:   Test measurements:                   RESTRICTIONS/PRECAUTIONS: none    Exercises/Interventions:     Therapeutic Ex 23' Resistance Sets/sec Reps Notes   Retro Stepper/BIKE  6'  full revolutions  Half revolutions   Quad sets  5\" 20    3 way SLR  3 10 Flexion/abd   SAQ  3 10 hold   Clam ABD maroon 2 10    wall slides   10\" 10    Bridging  2 10 SB   Prone quad stretch  30 3    Leg Press Ecc 0- 45# 2 10 More for ROM    HS curl/LAQ 20# 2 10    TKE 35# 2 10    Glute side walks       BOSU squat       Calf stretch  30\" 3                  Manual Intervention 25'       Knee mobs/STM  15'  Flex w/ estim   Tib/Fem Mobs       Patella Mobs  5'     Ankle mobs                     NMR re-education  7'       Turkish/Biofeedback 10/10       G.  Med activaiton/standing  2 10    4\" step up/down/over  2 10    Mini squats  2 10    Bosu Bal and Prop- G Med tissue swelling/inflammation/restriction, improving soft tissue extensibility and allowing for proper ROM for normal function with self care, mobility, lifting and ambulation. Modalities:  HV/CP x 15'    Charges:  Timed Code Treatment Minutes: 50   Total Treatment Minutes: 65     [] EVAL  [x] DA(77261) x  2   [] IONTO  [] NMR (52179) x      [] VASO  [x] Manual (34485) x  1    [] Other:  [] TA x       [] Mech Traction (94127)  [] ES(attended) (86244)      [] ES (un) (46142):     GOALS:   Patient stated goal: \"back to normal activities\"    Therapist goals for Patient:   Short Term Goals: To be achieved in: 2 weeks  1. Independent in HEP and progression per patient tolerance, in order to prevent re-injury. 2. Patient will have a decrease in pain to facilitate improvement in movement, function, and ADLs as indicated by Functional Deficits. Long Term Goals: To be achieved in: 12 weeks  1. Disability index score of 25% or less for the LEFS to assist with reaching prior level of function. 2. Patient will demonstrate increased AROM to WNL to allow for proper joint functioning as indicated by patients Functional Deficits. 3. Patient will demonstrate an increase in Strength to WNL to allow for proper functional mobility as indicated by patients Functional Deficits. 4. Patient will return to all functional activities without increased symptoms or restriction. 5. Patient will be able to walk 1 mile without AD without pain in R knee. Progression Towards Functional goals:  [x] Patient is progressing as expected towards functional goals listed. [] Progression is slowed due to complexities listed. [] Progression has been slowed due to co-morbidities.   [] Plan just implemented, too soon to assess goals progression  [] Other:     ASSESSMENT: Pt to obtain TENS unit for home     Treatment/Activity Tolerance:  [x] Patient tolerated treatment well [] Patient limited by fatique  [] Patient limited by pain  []

## 2018-03-21 ENCOUNTER — HOSPITAL ENCOUNTER (OUTPATIENT)
Dept: PHYSICAL THERAPY | Age: 68
Discharge: HOME | End: 2018-03-22
Admitting: ORTHOPAEDIC SURGERY

## 2018-03-21 NOTE — FLOWSHEET NOTE
activaiton/standing  2 10    4\" step up/down/over  2 10    Mini squats  2 10    Bosu Bal and Prop- G Med       Single leg stance/Balance/Prop Foam  Half foam 30  1' 3  3   tandem   HR/TR       Tandem balance  1' 3               Therapeutic Exercise and NMR EXR  [x] (58954) Provided verbal/tactile cueing for activities related to strengthening, flexibility, endurance, ROM for improvements in LE, proximal hip, and core control with self care, mobility, lifting, ambulation.  [] (02507) Provided verbal/tactile cueing for activities related to improving balance, coordination, kinesthetic sense, posture, motor skill, proprioception  to assist with LE, proximal hip, and core control in self care, mobility, lifting, ambulation and eccentric single leg control.      NMR and Therapeutic Activities:    [] (76568 or 66264) Provided verbal/tactile cueing for activities related to improving balance, coordination, kinesthetic sense, posture, motor skill, proprioception and motor activation to allow for proper function of core, proximal hip and LE with self care and ADLs  [] (86448) Gait Re-education- Provided training and instruction to the patient for proper LE, core and proximal hip recruitment and positioning and eccentric body weight control with ambulation re-education including up and down stairs     Home Exercise Program:    [x] (33168) Reviewed/Progressed HEP activities related to strengthening, flexibility, endurance, ROM of core, proximal hip and LE for functional self-care, mobility, lifting and ambulation/stair navigation   [] (81704)Reviewed/Progressed HEP activities related to improving balance, coordination, kinesthetic sense, posture, motor skill, proprioception of core, proximal hip and LE for self care, mobility, lifting, and ambulation/stair navigation      Manual Treatments:  PROM / STM / Oscillations-Mobs:  G-I, II, III, IV (PA's, Inf., Post.)  [] (51028) Provided manual therapy to mobilize LE, proximal hip

## 2018-03-26 ENCOUNTER — HOSPITAL ENCOUNTER (OUTPATIENT)
Dept: PHYSICAL THERAPY | Age: 68
Discharge: HOME | End: 2018-03-27
Admitting: ORTHOPAEDIC SURGERY

## 2018-03-26 NOTE — FLOWSHEET NOTE
[address], [facility]          Physical Therapy Daily Treatment Note  Date:  3/26/2018    Patient Name:  Gill Kelley    :  1950  MRN: [id number]  Restrictions/Precautions:    Medical/Treatment Diagnosis Information:  · Diagnosis: Primary OA of R knee (M17.11)  · Treatment Diagnosis: M17.11 (Primary OA of R knee)  Insurance/Certification information:  PT Insurance Information: Medicare  Physician Information:  Referring Practitioner: Dr. Delbert Ozuna of care signed (Y/N):     Date of Patient follow up with Physician:     G-Code (if applicable):      Date G-Code Applied:         Progress Note: [x]  Yes-  or 20 visit  []  No  Next due by: Visit #10       Latex Allergy:  [x]NO      []YES  Preferred Language for Healthcare:   [x]English       []other:    Visit # Insurance Allowable   16 MN (medicare)     Pain level:  4/10 at its worst     SUBJECTIVE: Pt reports able to perform sit to stand this weekend without difficulty, pt reports stairs are becoming easier to negotiate but continues to be most difficult in the evenings. Pt starting water aerobic class tomorrow.       OBJECTIVE:   Observation:   Test measurements:  AAROM knee flexion 115 degrees                RESTRICTIONS/PRECAUTIONS: none    Exercises/Interventions:     Therapeutic Ex 23' Resistance Sets/sec Reps Notes   Retro Stepper/BIKE  6'  full revolutions  Half revolutions   Quad sets  5\" 20    3 way SLR  3 10 Flexion/abd   SAQ  3 10 hold   Clam ABD maroon 2 10    wall slides   10\" 10    Bridging  2 10 SB   Prone quad stretch  30 3    Leg Press Ecc 0- 60# 2 10 More for ROM    HS curl/LAQ 20# 2 10    ROSEMARY abduction 35# 2 15    TRX squats   x10 mini   TKE 40# 2 10    Glute side walks   4 laps NR/cone step over   BOSU squat       Calf stretch  30\" 3                  Manual Intervention 15'       Knee mobs/STM  10'     Tib/Fem Mobs       Patella Mobs  5'     Ankle mobs                     NMR (21984) Provided manual therapy to mobilize LE, proximal hip and/or LS spine soft tissue/joints for the purpose of modulating pain, promoting relaxation,  increasing ROM, reducing/eliminating soft tissue swelling/inflammation/restriction, improving soft tissue extensibility and allowing for proper ROM for normal function with self care, mobility, lifting and ambulation. Modalities:  HV/CP x 15'    Charges:  Timed Code Treatment Minutes: 50   Total Treatment Minutes: 60     [] EVAL  [x] AN(04641) x  2   [] IONTO  [] NMR (95870) x      [] VASO  [x] Manual (94444) x  1    [] Other:  [] TA x       [] Mech Traction (36366)  [] ES(attended) (90667)      [x] ES (un) (31535):     GOALS:   Patient stated goal: \"back to normal activities\"    Therapist goals for Patient:   Short Term Goals: To be achieved in: 2 weeks  1. Independent in HEP and progression per patient tolerance, in order to prevent re-injury. 2. Patient will have a decrease in pain to facilitate improvement in movement, function, and ADLs as indicated by Functional Deficits. Long Term Goals: To be achieved in: 12 weeks  1. Disability index score of 25% or less for the LEFS to assist with reaching prior level of function. 2. Patient will demonstrate increased AROM to WNL to allow for proper joint functioning as indicated by patients Functional Deficits. 3. Patient will demonstrate an increase in Strength to WNL to allow for proper functional mobility as indicated by patients Functional Deficits. 4. Patient will return to all functional activities without increased symptoms or restriction. 5. Patient will be able to walk 1 mile without AD without pain in R knee. Progression Towards Functional goals:  [x] Patient is progressing as expected towards functional goals listed. [] Progression is slowed due to complexities listed. [] Progression has been slowed due to co-morbidities.   [] Plan just implemented, too soon to assess goals

## 2018-04-01 ENCOUNTER — HOSPITAL ENCOUNTER (OUTPATIENT)
Dept: PHYSICAL THERAPY | Age: 68
Discharge: HOME | End: 2018-04-30
Attending: ORTHOPAEDIC SURGERY | Admitting: ORTHOPAEDIC SURGERY

## 2018-04-02 ENCOUNTER — HOSPITAL ENCOUNTER (OUTPATIENT)
Dept: PHYSICAL THERAPY | Age: 68
Discharge: HOME | End: 2018-04-03
Admitting: ORTHOPAEDIC SURGERY

## 2018-04-06 ENCOUNTER — OFFICE VISIT (OUTPATIENT)
Dept: ORTHOPEDIC SURGERY | Age: 68
End: 2018-04-06

## 2018-04-06 VITALS — WEIGHT: 199.08 LBS | HEIGHT: 68 IN | BODY MASS INDEX: 30.17 KG/M2

## 2018-04-06 DIAGNOSIS — Z96.611 STATUS POST REVERSE TOTAL REPLACEMENT OF RIGHT SHOULDER: Primary | ICD-10-CM

## 2018-04-06 DIAGNOSIS — M19.012 OSTEOARTHRITIS OF LEFT GLENOHUMERAL JOINT: ICD-10-CM

## 2018-04-06 PROCEDURE — 1090F PRES/ABSN URINE INCON ASSESS: CPT | Performed by: ORTHOPAEDIC SURGERY

## 2018-04-06 PROCEDURE — 3017F COLORECTAL CA SCREEN DOC REV: CPT | Performed by: ORTHOPAEDIC SURGERY

## 2018-04-06 PROCEDURE — 3014F SCREEN MAMMO DOC REV: CPT | Performed by: ORTHOPAEDIC SURGERY

## 2018-04-06 PROCEDURE — G8400 PT W/DXA NO RESULTS DOC: HCPCS | Performed by: ORTHOPAEDIC SURGERY

## 2018-04-06 PROCEDURE — 1123F ACP DISCUSS/DSCN MKR DOCD: CPT | Performed by: ORTHOPAEDIC SURGERY

## 2018-04-06 PROCEDURE — 99213 OFFICE O/P EST LOW 20 MIN: CPT | Performed by: ORTHOPAEDIC SURGERY

## 2018-04-06 PROCEDURE — 4040F PNEUMOC VAC/ADMIN/RCVD: CPT | Performed by: ORTHOPAEDIC SURGERY

## 2018-04-06 PROCEDURE — G8427 DOCREV CUR MEDS BY ELIG CLIN: HCPCS | Performed by: ORTHOPAEDIC SURGERY

## 2018-04-06 PROCEDURE — 1036F TOBACCO NON-USER: CPT | Performed by: ORTHOPAEDIC SURGERY

## 2018-04-06 PROCEDURE — G8417 CALC BMI ABV UP PARAM F/U: HCPCS | Performed by: ORTHOPAEDIC SURGERY

## 2018-04-06 RX ORDER — PREDNISONE 10 MG/1
TABLET ORAL
Qty: 26 TABLET | Refills: 0 | Status: SHIPPED | OUTPATIENT
Start: 2018-04-06 | End: 2021-03-02

## 2018-04-18 ENCOUNTER — HOSPITAL ENCOUNTER (OUTPATIENT)
Dept: PHYSICAL THERAPY | Age: 68
Discharge: HOME | End: 2018-04-19
Admitting: ORTHOPAEDIC SURGERY

## 2018-05-01 ENCOUNTER — HOSPITAL ENCOUNTER (OUTPATIENT)
Dept: PHYSICAL THERAPY | Age: 68
Discharge: HOME | End: 2018-05-31
Attending: ORTHOPAEDIC SURGERY | Admitting: ORTHOPAEDIC SURGERY

## 2018-05-02 ENCOUNTER — HOSPITAL ENCOUNTER (OUTPATIENT)
Dept: PHYSICAL THERAPY | Age: 68
Discharge: HOME | End: 2018-05-03
Admitting: ORTHOPAEDIC SURGERY

## 2018-06-01 ENCOUNTER — HOSPITAL ENCOUNTER (OUTPATIENT)
Dept: PHYSICAL THERAPY | Age: 68
Discharge: HOME | End: 2018-06-30
Attending: ORTHOPAEDIC SURGERY | Admitting: ORTHOPAEDIC SURGERY

## 2018-11-12 ENCOUNTER — HOSPITAL ENCOUNTER (OUTPATIENT)
Dept: PHYSICAL THERAPY | Age: 68
Setting detail: THERAPIES SERIES
Discharge: HOME | End: 2018-11-12
Payer: MEDICARE

## 2018-11-12 PROCEDURE — 97140 MANUAL THERAPY 1/> REGIONS: CPT

## 2018-11-12 PROCEDURE — 97161 PT EVAL LOW COMPLEX 20 MIN: CPT

## 2018-11-12 PROCEDURE — 97110 THERAPEUTIC EXERCISES: CPT

## 2018-11-12 PROCEDURE — G8984 CARRY CURRENT STATUS: HCPCS

## 2018-11-12 PROCEDURE — G8985 CARRY GOAL STATUS: HCPCS

## 2018-11-12 NOTE — PLAN OF CARE
[facility] [address]  Phone [phone]   Fax [phone]                                                       Physical Therapy Certification    Dear Referring Practitioner: Dr. Buddy Mcguire ,    We had the pleasure of evaluating the following patient for physical therapy services at [address] [place] [address]. A summary of our findings can be found in the initial assessment below. This includes our plan of care. If you have any questions or concerns regarding these findings, please do not hesitate to contact me at the office phone number checked above. Thank you for the referral.       Physician Signature:_______________________________Date:__________________  By signing above (or electronic signature), therapists plan is approved by physician      Patient: Roman Kelley   : 1950   MRN: [id number]  Referring Physician: Referring Practitioner: Dr. Buddy Mcguire       Evaluation Date: 2018      Medical Diagnosis Information:  Diagnosis: M19.011 (R shoulder arthritis)    Treatment Diagnosis: L shoulder pain                                          Insurance information: PT Insurance Information: Medicare & AARP     Precautions/ Contra-indications: TSA precautions   Latex Allergy:  [x]NO      []YES  Preferred Language for Healthcare:   [x]English       []other:    SUBJECTIVE: Patient stated complaint: L TSA DOS with Dr. Aracely Garcia 10/29/18 (2 weeks s/p). D/C from sling, precautions- no lifting, no overhead, no bands with ER. NOW more soreness since being out of the sling over the weekend. Did have a lot of biceps swelling, has resolved some. Taking a pain pill before bed, sleeping in bed with sling.      Relevant Medical History: R reverse TSA 2017, R TKA 2018  Functional Disability Index:PT G-Codes  Functional Assessment Tool Used: quickDASH   Score: 63%  Functional Dry needling     []other:     Prognosis/Rehab Potential:      [x]Excellent   []Good    []Fair   []Poor    Tolerance of evaluation/treatment:    [x]Excellent   []Good    []Fair   []Poor    Physical Therapy Evaluation Complexity Justification  [] A history of present problem with:  [x] no personal factors and/or comorbidities that impact the plan of care;  []1-2 personal factors and/or comorbidities that impact the plan of care  []3 personal factors and/or comorbidities that impact the plan of care  [] An examination of body systems using standardized tests and measures addressing any of the following: body structures and functions (impairments), activity limitations, and/or participation restrictions;:  [x] a total of 1-2 or more elements   [] a total of 3 or more elements   [] a total of 4 or more elements   [] A clinical presentation with:  [x] stable and/or uncomplicated characteristics   [] evolving clinical presentation with changing characteristics  [] unstable and unpredictable characteristics;   [] Clinical decision making of [x] low, [] moderate, [] high complexity using standardized patient assessment instrument and/or measurable assessment of functional outcome. [x] EVAL (LOW) 37454 (typically 30 minutes face-to-face)  [] EVAL (MOD) 94348 (typically 30 minutes face-to-face)  [] EVAL (HIGH) 85574 (typically 45 minutes face-to-face)  [] RE-EVAL     PLAN:  Frequency/Duration:  1-2 days per week for 12 Weeks:  INTERVENTIONS:  [x] Therapeutic exercise including: strength training, ROM, for Upper extremity and core   [x]  NMR activation and proprioception for UE, scap and Core   [x] Manual therapy as indicated for shoulder, scapula and spine to include: Dry Needling/IASTM, STM, PROM, Gr I-IV mobilizations, manipulation.    [x] Modalities as needed that may include: thermal agents, E-stim, Biofeedback, US, iontophoresis as indicated  [x] Patient education on joint protection, postural re-education, activity

## 2018-11-12 NOTE — FLOWSHEET NOTE
[facility]    Physical Therapy Daily Treatment Note  Date:  2018    Patient Name:  Juan Guerra    :  1950  MRN: [id number]  Restrictions/Precautions:    Medical/Treatment Diagnosis Information:  Diagnosis: M19.011 (R shoulder arthritis)   Treatment Diagnosis: L shoulder pain   Insurance/Certification information:  PT Insurance Information: Medicare & AARP   Physician Information:  Referring Practitioner: Dr. Leandra Ibanez of care signed (Y/N):     Date of Patient follow up with Physician:     G-Code (if applicable):      Date G-Code Applied:    PT G-Codes  Functional Assessment Tool Used: quickDASH   Score: 63%  Functional Limitation: Carrying, moving and handling objects  Carrying, Moving and Handling Objects Current Status (): At least 60 percent but less than 80 percent impaired, limited or restricted  Carrying, Moving and Handling Objects Goal Status ():  At least 20 percent but less than 40 percent impaired, limited or restricted    Progress Note: [x]  Yes  []  No  Next due by: Visit #10      Latex Allergy:  [x]NO      []YES  Preferred Language for Healthcare:   [x]English       []other:    Visit # Insurance Allowable   1 MN     Pain level:  5/10 at its worst     SUBJECTIVE:  See eval    OBJECTIVE: See eval  Observation:   Test measurements:      RESTRICTIONS/PRECAUTIONS: L TSA 10/29/18    Exercises/Interventions:   Therapeutic Ex Wt / Resistance Sets/sec Reps Notes   Pendulums  2 30s    AAROM ER  AAROM flexion   2  2 10  10    scap retractions  scap elevation/depression   2  2 10  10    Table slides  2 10 Flexion    T- band lower pinch       T- band ER activation       Supine SA punch       SL ER       Prone Rows/HAB/ext       Seat Table slides/Wall Slides       Seated HH  Depression       No Money       Standing flex/scap       Standing Punch                                          Manual Intervention tolerated treatment well [] Patient limited by fatique  [] Patient limited by pain  [] Patient limited by other medical complications  [] Other:     Prognosis: [x] Good [] Fair  [] Poor    Patient Requires Follow-up: [x] Yes  [] No    PLAN: See eval  [] Continue per plan of care [] Alter current plan (see comments)  [x] Plan of care initiated [] Hold pending MD visit [] Discharge    Electronically signed by:  Aneta Schofield PT, DPT

## 2018-11-14 ENCOUNTER — HOSPITAL ENCOUNTER (OUTPATIENT)
Dept: PHYSICAL THERAPY | Age: 68
Setting detail: THERAPIES SERIES
Discharge: HOME | End: 2018-11-14
Payer: MEDICARE

## 2018-11-14 PROCEDURE — 97110 THERAPEUTIC EXERCISES: CPT

## 2018-11-14 PROCEDURE — 97140 MANUAL THERAPY 1/> REGIONS: CPT

## 2018-11-14 NOTE — FLOWSHEET NOTE
lifting, house/yardwork, driving/computer work    Modalities:  Ice x10 min    Charges:  Timed Code Treatment Minutes: 40 min   Total Treatment Minutes: 65 min     [] EVAL  [x] DU(85970) x  2   [] IONTO  [] NMR (50369) x      [] VASO  [x] Manual (58017) x  1    [] Other:  [] TA x       [] Mech Traction (71809)  [] ES(attended) (41927)      [] ES (un) (13749):     GOALS:  Patient stated goal: full function back, no pain     Therapist goals for Patient:   Short Term Goals: To be achieved in: 2 weeks  1. Independent in HEP and progression per patient tolerance, in order to prevent re-injury. 2. Patient will have a decrease in pain to facilitate improvement in movement, function, and ADLs as indicated by Functional Deficits. Long Term Goals: To be achieved in: 12 weeks  1. Disability index score of 25% or less for the DASH to assist with reaching prior level of function. 2. Patient will demonstrate increased AROM to [facility] to allow for proper joint functioning as indicated by patients Functional Deficits. 3. Patient will demonstrate an increase in Strength to 5/5 to allow for proper functional mobility as indicated by patients Functional Deficits. 4. Patient will return to all functional activities without increased symptoms or restriction. Progression Towards Functional goals:  [] Patient is progressing as expected towards functional goals listed. [] Progression is slowed due to complexities listed. [] Progression has been slowed due to co-morbidities.   [x] Plan just implemented, too soon to assess goals progression  [] Other:     ASSESSMENT:  See eval    Treatment/Activity Tolerance:  [x] Patient tolerated treatment well [] Patient limited by fatique  [] Patient limited by pain  [] Patient limited by other medical complications  [] Other:     Prognosis: [x] Good [] Fair  [] Poor    Patient Requires Follow-up: [x] Yes  [] No    PLAN: Progress shoulder ROM per protocol   [] Continue per plan of care []

## 2018-11-19 ENCOUNTER — HOSPITAL ENCOUNTER (OUTPATIENT)
Dept: PHYSICAL THERAPY | Age: 68
Setting detail: THERAPIES SERIES
Discharge: HOME | End: 2018-11-19
Payer: MEDICARE

## 2018-11-19 PROCEDURE — 97110 THERAPEUTIC EXERCISES: CPT | Performed by: PHYSICAL THERAPY ASSISTANT

## 2018-11-19 PROCEDURE — 97140 MANUAL THERAPY 1/> REGIONS: CPT | Performed by: PHYSICAL THERAPY ASSISTANT

## 2018-11-26 ENCOUNTER — HOSPITAL ENCOUNTER (OUTPATIENT)
Dept: PHYSICAL THERAPY | Age: 68
Setting detail: THERAPIES SERIES
Discharge: HOME | End: 2018-11-26
Payer: MEDICARE

## 2018-11-26 PROCEDURE — 97140 MANUAL THERAPY 1/> REGIONS: CPT | Performed by: PHYSICAL THERAPY ASSISTANT

## 2018-11-26 PROCEDURE — 97110 THERAPEUTIC EXERCISES: CPT | Performed by: PHYSICAL THERAPY ASSISTANT

## 2018-11-28 ENCOUNTER — HOSPITAL ENCOUNTER (OUTPATIENT)
Dept: PHYSICAL THERAPY | Age: 68
Setting detail: THERAPIES SERIES
Discharge: HOME | End: 2018-11-28
Payer: MEDICARE

## 2018-11-28 PROCEDURE — 97110 THERAPEUTIC EXERCISES: CPT | Performed by: PHYSICAL THERAPY ASSISTANT

## 2018-11-28 PROCEDURE — 97140 MANUAL THERAPY 1/> REGIONS: CPT | Performed by: PHYSICAL THERAPY ASSISTANT

## 2018-12-03 ENCOUNTER — HOSPITAL ENCOUNTER (OUTPATIENT)
Dept: PHYSICAL THERAPY | Age: 68
Setting detail: THERAPIES SERIES
Discharge: HOME | End: 2018-12-03
Payer: MEDICARE

## 2018-12-03 PROCEDURE — 97140 MANUAL THERAPY 1/> REGIONS: CPT

## 2018-12-03 PROCEDURE — 97110 THERAPEUTIC EXERCISES: CPT

## 2018-12-03 NOTE — FLOWSHEET NOTE
increasing ROM, reducing/eliminating soft tissue swelling/inflammation/restriction, improving soft tissue extensibility and allowing for proper ROM for normal function with self care, reaching, carrying, lifting, house/yardwork, driving/computer work    Modalities:  Ice x10 min    Charges:  Timed Code Treatment Minutes: 60 min   Total Treatment Minutes: 70 min     [] EVAL  [x] SX(74190) x  2   [] IONTO  [] NMR (55362) x      [] VASO  [x] Manual (23473) x  2    [] Other:  [] TA x       [] Mech Traction (38667)  [] ES(attended) (46277)      [] ES (un) (37219):     GOALS:  Patient stated goal: full function back, no pain     Therapist goals for Patient:   Short Term Goals: To be achieved in: 2 weeks  1. Independent in HEP and progression per patient tolerance, in order to prevent re-injury. 2. Patient will have a decrease in pain to facilitate improvement in movement, function, and ADLs as indicated by Functional Deficits. Long Term Goals: To be achieved in: 12 weeks  1. Disability index score of 25% or less for the DASH to assist with reaching prior level of function. 2. Patient will demonstrate increased AROM to [facility] to allow for proper joint functioning as indicated by patients Functional Deficits. 3. Patient will demonstrate an increase in Strength to 5/5 to allow for proper functional mobility as indicated by patients Functional Deficits. 4. Patient will return to all functional activities without increased symptoms or restriction. Progression Towards Functional goals:  [x] Patient is progressing as expected towards functional goals listed. [] Progression is slowed due to complexities listed. [] Progression has been slowed due to co-morbidities. [] Plan just implemented, too soon to assess goals progression  [] Other:     ASSESSMENT:  No loss of PROM/ROM this treatment session. Pt tender to palpation posterior RTC and lateral deltoid. Pt tolerates progressions with no pain.

## 2018-12-05 ENCOUNTER — HOSPITAL ENCOUNTER (OUTPATIENT)
Dept: PHYSICAL THERAPY | Age: 68
Setting detail: THERAPIES SERIES
Discharge: HOME | End: 2018-12-05
Payer: MEDICARE

## 2018-12-05 PROCEDURE — 97110 THERAPEUTIC EXERCISES: CPT | Performed by: PHYSICAL THERAPY ASSISTANT

## 2018-12-05 PROCEDURE — 97140 MANUAL THERAPY 1/> REGIONS: CPT | Performed by: PHYSICAL THERAPY ASSISTANT

## 2018-12-05 NOTE — FLOWSHEET NOTE
[employer]    Physical Therapy Daily Treatment Note  Date:  2018    Patient Name:  Nish Garnica    :  1950  MRN: [id number]  Restrictions/Precautions:    Medical/Treatment Diagnosis Information:  Diagnosis: M19.011 (R shoulder arthritis)   Treatment Diagnosis: L shoulder pain   Insurance/Certification information:  PT Insurance Information: Medicare & [employer]   Physician Information:  Referring Practitioner: Dr. Israel Black of care signed (Y/N):     Date of Patient follow up with Physician:     G-Code (if applicable):      Date G-Code Applied:         Progress Note: [x]  Yes  []  No  Next due by: Visit #10      Latex Allergy:  [x]NO      []YES  Preferred Language for Healthcare:   [x]English       []other:    Visit # Insurance Allowable   7 MN     Pain level:  5/10 at its worst     SUBJECTIVE:  Pt states decrease in shoulder soreness  OBJECTIVE: 4+ weeks s/pl  Observation:   Test measurements:      RESTRICTIONS/PRECAUTIONS: L TSA 10/29/18    Exercises/Interventions:   Therapeutic Ex 30 Wt / Resistance Sets/sec Reps Notes   Pulleys  6'     AAROM cane flexion and ER  2 10    scap retraction/protraction    210Bent over        wall slides  2 10    T- band row/ext red 2 10 To neutral   T- band ER activation       Supine SA punch  2 10    SL ER  3 10 to neutral   Prone Rows/HAB/ext       Seat Table slides/Wall Slides              No Money  2 10    Standing flex/scap       Standing Punch       Isometrics  10 10 flex and ER only                               Manual Intervention 20'       Shld /GH Mobs  5 min     STM   5 min  Posterior RTC, lateral deltoid   Thoracic/Rib manipualtion       CT MT/Mobs       PROM MT  15 min                   NMR re-education 3'       T-spine Ext       GH depress sm MB 5 20    lawnmower  2 10    Scap/GH NMR       Body blade       Wall ball roll       Wall Ball bounce                  Therapeutic Exercise and driving/computer work    Modalities:  Ice x10 min    Charges:  Timed Code Treatment Minutes: 53   Total Treatment Minutes: 63     [] EVAL  [x] WA(67032) x  2   [] IONTO  [] NMR (68798) x      [] VASO  [x] Manual (58794) x  2    [] Other:  [] TA x       [] Mech Traction (54968)  [] ES(attended) (58810)      [] ES (un) (27825):     GOALS:  Patient stated goal: full function back, no pain     Therapist goals for Patient:   Short Term Goals: To be achieved in: 2 weeks  1. Independent in HEP and progression per patient tolerance, in order to prevent re-injury. 2. Patient will have a decrease in pain to facilitate improvement in movement, function, and ADLs as indicated by Functional Deficits. Long Term Goals: To be achieved in: 12 weeks  1. Disability index score of 25% or less for the DASH to assist with reaching prior level of function. 2. Patient will demonstrate increased AROM to [facility] to allow for proper joint functioning as indicated by patients Functional Deficits. 3. Patient will demonstrate an increase in Strength to 5/5 to allow for proper functional mobility as indicated by patients Functional Deficits. 4. Patient will return to all functional activities without increased symptoms or restriction. Progression Towards Functional goals:  [x] Patient is progressing as expected towards functional goals listed. [] Progression is slowed due to complexities listed. [] Progression has been slowed due to co-morbidities. [] Plan just implemented, too soon to assess goals progression  [] Other:     ASSESSMENT:  Pt tolerates progressions with no pain.      Treatment/Activity Tolerance:  [x] Patient tolerated treatment well [] Patient limited by fatique  [] Patient limited by pain  [] Patient limited by other medical complications  [] Other:     Prognosis: [x] Good [] Fair  [] Poor    Patient Requires Follow-up: [x] Yes  [] No    PLAN: Progress shoulder ROM per protocol   [x] Continue per plan of care [] Alter current plan (see comments)  [] Plan of care initiated [] Hold pending MD visit [] Discharge    Electronically signed by: Shanti Marrero PTA

## 2018-12-10 ENCOUNTER — HOSPITAL ENCOUNTER (OUTPATIENT)
Dept: PHYSICAL THERAPY | Age: 68
Setting detail: THERAPIES SERIES
Discharge: HOME | End: 2018-12-10
Payer: MEDICARE

## 2018-12-10 PROCEDURE — 97140 MANUAL THERAPY 1/> REGIONS: CPT

## 2018-12-10 PROCEDURE — 97110 THERAPEUTIC EXERCISES: CPT

## 2018-12-10 NOTE — FLOWSHEET NOTE
driving/computer work    Modalities:  Ice x10 min    Charges:  Timed Code Treatment Minutes: 53   Total Treatment Minutes: 63     [] EVAL  [x] JH(47991) x  3   [] IONTO  [] NMR (64046) x      [] VASO  [x] Manual (94579) x  1    [] Other:  [] TA x       [] Mech Traction (06698)  [] ES(attended) (52071)      [] ES (un) (68466):     GOALS:  Patient stated goal: full function back, no pain     Therapist goals for Patient:   Short Term Goals: To be achieved in: 2 weeks  1. Independent in HEP and progression per patient tolerance, in order to prevent re-injury. 2. Patient will have a decrease in pain to facilitate improvement in movement, function, and ADLs as indicated by Functional Deficits. Long Term Goals: To be achieved in: 12 weeks  1. Disability index score of 25% or less for the DASH to assist with reaching prior level of function. 2. Patient will demonstrate increased AROM to [facility] to allow for proper joint functioning as indicated by patients Functional Deficits. 3. Patient will demonstrate an increase in Strength to 5/5 to allow for proper functional mobility as indicated by patients Functional Deficits. 4. Patient will return to all functional activities without increased symptoms or restriction. Progression Towards Functional goals:  [x] Patient is progressing as expected towards functional goals listed. [] Progression is slowed due to complexities listed. [] Progression has been slowed due to co-morbidities.   [] Plan just implemented, too soon to assess goals progression  [] Other:     ASSESSMENT:     Treatment/Activity Tolerance:  [x] Patient tolerated treatment well [] Patient limited by fatique  [] Patient limited by pain  [] Patient limited by other medical complications  [] Other:     Prognosis: [x] Good [] Fair  [] Poor    Patient Requires Follow-up: [x] Yes  [] No    PLAN: Progress shoulder ROM/strength per protocol   [x] Continue per plan of care [] Alter current plan (see

## 2018-12-12 ENCOUNTER — HOSPITAL ENCOUNTER (OUTPATIENT)
Dept: PHYSICAL THERAPY | Age: 68
Setting detail: THERAPIES SERIES
Discharge: HOME | End: 2018-12-12
Payer: MEDICARE

## 2018-12-12 PROCEDURE — 97110 THERAPEUTIC EXERCISES: CPT | Performed by: PHYSICAL THERAPY ASSISTANT

## 2018-12-12 PROCEDURE — 97140 MANUAL THERAPY 1/> REGIONS: CPT | Performed by: PHYSICAL THERAPY ASSISTANT

## 2018-12-12 NOTE — FLOWSHEET NOTE
Wall ball roll       Wall Ball bounce                  Therapeutic Exercise and NMR EXR  [x] (69123) Provided verbal/tactile cueing for activities related to strengthening, flexibility, endurance, ROM  for improvements in scapular, scapulothoracic and UE control with self care, reaching, carrying, lifting, house/yardwork, driving/computer work.    [] (44278) Provided verbal/tactile cueing for activities related to improving balance, coordination, kinesthetic sense, posture, motor skill, proprioception  to assist with  scapular, scapulothoracic and UE control with self care, reaching, carrying, lifting, house/yardwork, driving/computer work. Therapeutic Activities:    [] (79482 or 40883) Provided verbal/tactile cueing for activities related to improving balance, coordination, kinesthetic sense, posture, motor skill, proprioception and motor activation to allow for proper function of scapular, scapulothoracic and UE control with self care, carrying, lifting, driving/computer work.      Home Exercise Program:    [x] (45911) Reviewed/Progressed HEP activities related to strengthening, flexibility, endurance, ROM of scapular, scapulothoracic and UE control with self care, reaching, carrying, lifting, house/yardwork, driving/computer work  [] (04807) Reviewed/Progressed HEP activities related to improving balance, coordination, kinesthetic sense, posture, motor skill, proprioception of scapular, scapulothoracic and UE control with self care, reaching, carrying, lifting, house/yardwork, driving/computer work      Manual Treatments:  PROM / STM / Oscillations-Mobs:  G-I, II, III, IV (PA's, Inf., Post.)  [x] (42049) Provided manual therapy to mobilize soft tissue/joints of cervical/CT, scapular GHJ and UE for the purpose of modulating pain, promoting relaxation,  increasing ROM, reducing/eliminating soft tissue swelling/inflammation/restriction, improving soft tissue extensibility and allowing for proper ROM for normal function with self care, reaching, carrying, lifting, house/yardwork, driving/computer work    Modalities:  Ice x10 min    Charges:  Timed Code Treatment Minutes: 53   Total Treatment Minutes: 63     [] EVAL  [x] QR(76132) x  3   [] IONTO  [] NMR (82145) x      [] VASO  [x] Manual (11973) x  1    [] Other:  [] TA x       [] Mech Traction (55931)  [] ES(attended) (53016)      [] ES (un) (57474):     GOALS:  Patient stated goal: full function back, no pain     Therapist goals for Patient:   Short Term Goals: To be achieved in: 2 weeks  1. Independent in HEP and progression per patient tolerance, in order to prevent re-injury. 2. Patient will have a decrease in pain to facilitate improvement in movement, function, and ADLs as indicated by Functional Deficits. Long Term Goals: To be achieved in: 12 weeks  1. Disability index score of 25% or less for the DASH to assist with reaching prior level of function. 2. Patient will demonstrate increased AROM to [facility] to allow for proper joint functioning as indicated by patients Functional Deficits. 3. Patient will demonstrate an increase in Strength to 5/5 to allow for proper functional mobility as indicated by patients Functional Deficits. 4. Patient will return to all functional activities without increased symptoms or restriction. Progression Towards Functional goals:  [x] Patient is progressing as expected towards functional goals listed. [] Progression is slowed due to complexities listed. [] Progression has been slowed due to co-morbidities.   [] Plan just implemented, too soon to assess goals progression  [] Other:     ASSESSMENT: Cues required to improve scapular recruitment    Treatment/Activity Tolerance:  [x] Patient tolerated treatment well [] Patient limited by fatique  [] Patient limited by pain  [] Patient limited by other medical complications  [] Other:     Prognosis: [x] Good [] Fair  [] Poor    Patient Requires Follow-up: [x] Yes  [] No    PLAN: Progress shoulder ROM/strength per protocol   [x] Continue per plan of care [] Alter current plan (see comments)  [] Plan of care initiated [] Hold pending MD visit [] Discharge    Electronically signed by: Landry Berry PTA

## 2018-12-17 ENCOUNTER — HOSPITAL ENCOUNTER (OUTPATIENT)
Dept: PHYSICAL THERAPY | Age: 68
Setting detail: THERAPIES SERIES
Discharge: HOME | End: 2018-12-17
Payer: MEDICARE

## 2018-12-17 PROCEDURE — 97140 MANUAL THERAPY 1/> REGIONS: CPT

## 2018-12-17 PROCEDURE — 97110 THERAPEUTIC EXERCISES: CPT

## 2018-12-17 NOTE — FLOWSHEET NOTE
normal function with self care, reaching, carrying, lifting, house/yardwork, driving/computer work    Modalities:  Ice x10 min    Charges:  Timed Code Treatment Minutes: 53   Total Treatment Minutes: 63     [] EVAL  [x] XA(72814) x  3   [] IONTO  [] NMR (96346) x      [] VASO  [x] Manual (80986) x  1    [] Other:  [] TA x       [] [facility] (85748)  [] ES(attended) (09396)      [] ES (un) (80513):     GOALS:  Patient stated goal: full function back, no pain     Therapist goals for Patient:   Short Term Goals: To be achieved in: 2 weeks  1. Independent in HEP and progression per patient tolerance, in order to prevent re-injury. 2. Patient will have a decrease in pain to facilitate improvement in movement, function, and ADLs as indicated by Functional Deficits. Long Term Goals: To be achieved in: 12 weeks  1. Disability index score of 25% or less for the DASH to assist with reaching prior level of function. 2. Patient will demonstrate increased AROM to [facility] to allow for proper joint functioning as indicated by patients Functional Deficits. 3. Patient will demonstrate an increase in Strength to 5/5 to allow for proper functional mobility as indicated by patients Functional Deficits. 4. Patient will return to all functional activities without increased symptoms or restriction. Progression Towards Functional goals:  [x] Patient is progressing as expected towards functional goals listed. [] Progression is slowed due to complexities listed. [] Progression has been slowed due to co-morbidities. [] Plan just implemented, too soon to assess goals progression  [] Other:     ASSESSMENT: Overhead exercises with held this date d/t increase in pain.      Treatment/Activity Tolerance:  [x] Patient tolerated treatment well [] Patient limited by fatique  [] Patient limited by pain  [] Patient limited by other medical complications  [] Other:     Prognosis: [x] Good [] Fair  [] Poor    Patient Requires

## 2018-12-19 ENCOUNTER — HOSPITAL ENCOUNTER (OUTPATIENT)
Dept: PHYSICAL THERAPY | Age: 68
Setting detail: THERAPIES SERIES
Discharge: HOME | End: 2018-12-19
Payer: MEDICARE

## 2018-12-19 PROCEDURE — 97110 THERAPEUTIC EXERCISES: CPT | Performed by: PHYSICAL THERAPY ASSISTANT

## 2018-12-19 PROCEDURE — 97140 MANUAL THERAPY 1/> REGIONS: CPT | Performed by: PHYSICAL THERAPY ASSISTANT

## 2018-12-26 ENCOUNTER — HOSPITAL ENCOUNTER (OUTPATIENT)
Dept: PHYSICAL THERAPY | Age: 68
Setting detail: THERAPIES SERIES
Discharge: HOME | End: 2018-12-26
Payer: MEDICARE

## 2018-12-26 PROCEDURE — 97110 THERAPEUTIC EXERCISES: CPT | Performed by: PHYSICAL THERAPY ASSISTANT

## 2018-12-26 PROCEDURE — 97140 MANUAL THERAPY 1/> REGIONS: CPT | Performed by: PHYSICAL THERAPY ASSISTANT

## 2018-12-26 NOTE — FLOWSHEET NOTE
carrying, lifting, house/yardwork, driving/computer work    Modalities:  Ice x10 min    Charges:  Timed Code Treatment Minutes: 53   Total Treatment Minutes: 63     [] EVAL  [x] OI(11619) x  3   [] IONTO  [] NMR (49083) x      [] VASO  [x] Manual (60326) x  1    [] Other:  [] TA x       [] Mech Traction (66629)  [] ES(attended) (30540)      [] ES (un) (45359):     GOALS:  Patient stated goal: full function back, no pain     Therapist goals for Patient:   Short Term Goals: To be achieved in: 2 weeks  1. Independent in HEP and progression per patient tolerance, in order to prevent re-injury. 2. Patient will have a decrease in pain to facilitate improvement in movement, function, and ADLs as indicated by Functional Deficits. Long Term Goals: To be achieved in: 12 weeks  1. Disability index score of 25% or less for the DASH to assist with reaching prior level of function. 2. Patient will demonstrate increased AROM to [facility] to allow for proper joint functioning as indicated by patients Functional Deficits. 3. Patient will demonstrate an increase in Strength to 5/5 to allow for proper functional mobility as indicated by patients Functional Deficits. 4. Patient will return to all functional activities without increased symptoms or restriction. Progression Towards Functional goals:  [x] Patient is progressing as expected towards functional goals listed. [] Progression is slowed due to complexities listed. [] Progression has been slowed due to co-morbidities. [] Plan just implemented, too soon to assess goals progression  [] Other:     ASSESSMENT: Better tolerance to exercises this date.     Treatment/Activity Tolerance:  [x] Patient tolerated treatment well [] Patient limited by fatique  [] Patient limited by pain  [] Patient limited by other medical complications  [] Other:     Prognosis: [x] Good [] Fair  [] Poor    Patient Requires Follow-up: [x] Yes  [] No    PLAN: Progress shoulder ROM/strength per

## 2019-01-02 ENCOUNTER — HOSPITAL ENCOUNTER (OUTPATIENT)
Dept: PHYSICAL THERAPY | Age: 69
Setting detail: THERAPIES SERIES
Discharge: HOME | End: 2019-01-02
Payer: MEDICARE

## 2019-01-02 PROCEDURE — 97140 MANUAL THERAPY 1/> REGIONS: CPT | Performed by: PHYSICAL THERAPY ASSISTANT

## 2019-01-02 PROCEDURE — 97110 THERAPEUTIC EXERCISES: CPT | Performed by: PHYSICAL THERAPY ASSISTANT

## 2019-01-07 ENCOUNTER — HOSPITAL ENCOUNTER (OUTPATIENT)
Dept: PHYSICAL THERAPY | Age: 69
Setting detail: THERAPIES SERIES
Discharge: HOME | End: 2019-01-07
Payer: MEDICARE

## 2019-01-07 PROCEDURE — 97110 THERAPEUTIC EXERCISES: CPT

## 2019-01-07 PROCEDURE — 97140 MANUAL THERAPY 1/> REGIONS: CPT

## 2019-01-09 ENCOUNTER — HOSPITAL ENCOUNTER (OUTPATIENT)
Dept: PHYSICAL THERAPY | Age: 69
Setting detail: THERAPIES SERIES
Discharge: HOME | End: 2019-01-09
Payer: MEDICARE

## 2019-01-09 PROCEDURE — 97110 THERAPEUTIC EXERCISES: CPT | Performed by: PHYSICAL THERAPY ASSISTANT

## 2019-01-09 PROCEDURE — 97140 MANUAL THERAPY 1/> REGIONS: CPT | Performed by: PHYSICAL THERAPY ASSISTANT

## 2019-01-14 ENCOUNTER — HOSPITAL ENCOUNTER (OUTPATIENT)
Dept: PHYSICAL THERAPY | Age: 69
Setting detail: THERAPIES SERIES
Discharge: HOME | End: 2019-01-14
Payer: MEDICARE

## 2019-01-14 PROCEDURE — 97110 THERAPEUTIC EXERCISES: CPT

## 2019-01-14 PROCEDURE — 97140 MANUAL THERAPY 1/> REGIONS: CPT

## 2019-01-16 ENCOUNTER — HOSPITAL ENCOUNTER (OUTPATIENT)
Dept: PHYSICAL THERAPY | Age: 69
Setting detail: THERAPIES SERIES
Discharge: HOME | End: 2019-01-16
Payer: MEDICARE

## 2019-01-16 PROCEDURE — 97140 MANUAL THERAPY 1/> REGIONS: CPT | Performed by: PHYSICAL THERAPY ASSISTANT

## 2019-01-16 PROCEDURE — G0283 ELEC STIM OTHER THAN WOUND: HCPCS | Performed by: PHYSICAL THERAPY ASSISTANT

## 2019-01-16 PROCEDURE — 97110 THERAPEUTIC EXERCISES: CPT | Performed by: PHYSICAL THERAPY ASSISTANT

## 2019-01-21 ENCOUNTER — HOSPITAL ENCOUNTER (OUTPATIENT)
Dept: PHYSICAL THERAPY | Age: 69
Setting detail: THERAPIES SERIES
Discharge: HOME | End: 2019-01-21
Payer: MEDICARE

## 2019-01-21 PROCEDURE — 97140 MANUAL THERAPY 1/> REGIONS: CPT | Performed by: PHYSICAL THERAPIST

## 2019-01-21 PROCEDURE — 97110 THERAPEUTIC EXERCISES: CPT | Performed by: PHYSICAL THERAPY ASSISTANT

## 2019-01-21 PROCEDURE — 97140 MANUAL THERAPY 1/> REGIONS: CPT | Performed by: PHYSICAL THERAPY ASSISTANT

## 2019-01-23 ENCOUNTER — HOSPITAL ENCOUNTER (OUTPATIENT)
Dept: PHYSICAL THERAPY | Age: 69
Setting detail: THERAPIES SERIES
Discharge: HOME | End: 2019-01-23
Payer: MEDICARE

## 2019-01-23 PROCEDURE — 97110 THERAPEUTIC EXERCISES: CPT

## 2019-01-23 PROCEDURE — 97140 MANUAL THERAPY 1/> REGIONS: CPT

## 2019-01-28 ENCOUNTER — HOSPITAL ENCOUNTER (OUTPATIENT)
Dept: PHYSICAL THERAPY | Age: 69
Setting detail: THERAPIES SERIES
Discharge: HOME | End: 2019-01-28
Payer: MEDICARE

## 2019-01-28 PROCEDURE — 97140 MANUAL THERAPY 1/> REGIONS: CPT

## 2019-01-28 PROCEDURE — 97112 NEUROMUSCULAR REEDUCATION: CPT

## 2019-01-28 PROCEDURE — 97110 THERAPEUTIC EXERCISES: CPT

## 2019-01-30 ENCOUNTER — HOSPITAL ENCOUNTER (OUTPATIENT)
Dept: PHYSICAL THERAPY | Age: 69
Setting detail: THERAPIES SERIES
Discharge: HOME | End: 2019-01-30
Payer: MEDICARE

## 2019-01-30 PROCEDURE — 97110 THERAPEUTIC EXERCISES: CPT | Performed by: PHYSICAL THERAPY ASSISTANT

## 2019-01-30 PROCEDURE — 97140 MANUAL THERAPY 1/> REGIONS: CPT | Performed by: PHYSICAL THERAPY ASSISTANT

## 2019-02-06 ENCOUNTER — HOSPITAL ENCOUNTER (OUTPATIENT)
Dept: PHYSICAL THERAPY | Age: 69
Setting detail: THERAPIES SERIES
Discharge: HOME | End: 2019-02-06
Payer: MEDICARE

## 2019-02-06 PROCEDURE — 97110 THERAPEUTIC EXERCISES: CPT

## 2019-02-06 PROCEDURE — 97140 MANUAL THERAPY 1/> REGIONS: CPT

## 2019-02-11 ENCOUNTER — HOSPITAL ENCOUNTER (OUTPATIENT)
Dept: PHYSICAL THERAPY | Age: 69
Setting detail: THERAPIES SERIES
Discharge: HOME | End: 2019-02-11
Payer: MEDICARE

## 2019-02-11 PROCEDURE — 97140 MANUAL THERAPY 1/> REGIONS: CPT

## 2019-02-11 PROCEDURE — 97110 THERAPEUTIC EXERCISES: CPT

## 2019-02-11 PROCEDURE — 97112 NEUROMUSCULAR REEDUCATION: CPT

## 2019-02-13 ENCOUNTER — HOSPITAL ENCOUNTER (OUTPATIENT)
Dept: PHYSICAL THERAPY | Age: 69
Setting detail: THERAPIES SERIES
Discharge: HOME | End: 2019-02-13
Payer: MEDICARE

## 2019-02-13 PROCEDURE — 97140 MANUAL THERAPY 1/> REGIONS: CPT | Performed by: PHYSICAL THERAPY ASSISTANT

## 2019-02-13 PROCEDURE — 97112 NEUROMUSCULAR REEDUCATION: CPT | Performed by: PHYSICAL THERAPY ASSISTANT

## 2019-02-13 PROCEDURE — 97110 THERAPEUTIC EXERCISES: CPT | Performed by: PHYSICAL THERAPY ASSISTANT

## 2019-02-18 ENCOUNTER — HOSPITAL ENCOUNTER (OUTPATIENT)
Dept: PHYSICAL THERAPY | Age: 69
Setting detail: THERAPIES SERIES
Discharge: HOME | End: 2019-02-18
Payer: MEDICARE

## 2019-02-18 PROCEDURE — 97140 MANUAL THERAPY 1/> REGIONS: CPT

## 2019-02-18 PROCEDURE — 97110 THERAPEUTIC EXERCISES: CPT

## 2019-02-18 PROCEDURE — 97112 NEUROMUSCULAR REEDUCATION: CPT

## 2019-02-20 ENCOUNTER — HOSPITAL ENCOUNTER (OUTPATIENT)
Dept: PHYSICAL THERAPY | Age: 69
Setting detail: THERAPIES SERIES
Discharge: HOME | End: 2019-02-20
Payer: MEDICARE

## 2019-02-20 PROCEDURE — 97112 NEUROMUSCULAR REEDUCATION: CPT | Performed by: PHYSICAL THERAPY ASSISTANT

## 2019-02-20 PROCEDURE — 97140 MANUAL THERAPY 1/> REGIONS: CPT | Performed by: PHYSICAL THERAPY ASSISTANT

## 2019-02-20 PROCEDURE — 97110 THERAPEUTIC EXERCISES: CPT | Performed by: PHYSICAL THERAPY ASSISTANT

## 2019-02-27 ENCOUNTER — HOSPITAL ENCOUNTER (OUTPATIENT)
Dept: PHYSICAL THERAPY | Age: 69
Setting detail: THERAPIES SERIES
Discharge: HOME | End: 2019-02-27
Payer: MEDICARE

## 2019-02-27 PROCEDURE — 97140 MANUAL THERAPY 1/> REGIONS: CPT

## 2019-02-27 PROCEDURE — 97110 THERAPEUTIC EXERCISES: CPT

## 2019-02-27 PROCEDURE — 97161 PT EVAL LOW COMPLEX 20 MIN: CPT

## 2019-03-01 ENCOUNTER — HOSPITAL ENCOUNTER (OUTPATIENT)
Dept: PHYSICAL THERAPY | Age: 69
Setting detail: THERAPIES SERIES
Discharge: HOME | End: 2019-03-01
Payer: MEDICARE

## 2019-03-01 PROCEDURE — 97110 THERAPEUTIC EXERCISES: CPT

## 2019-03-01 PROCEDURE — 97140 MANUAL THERAPY 1/> REGIONS: CPT

## 2019-03-04 ENCOUNTER — HOSPITAL ENCOUNTER (OUTPATIENT)
Dept: PHYSICAL THERAPY | Age: 69
Setting detail: THERAPIES SERIES
Discharge: HOME | End: 2019-03-04
Payer: MEDICARE

## 2019-03-04 PROCEDURE — 97140 MANUAL THERAPY 1/> REGIONS: CPT

## 2019-03-04 PROCEDURE — 97110 THERAPEUTIC EXERCISES: CPT

## 2019-03-06 ENCOUNTER — APPOINTMENT (OUTPATIENT)
Dept: PHYSICAL THERAPY | Age: 69
End: 2019-03-06
Payer: MEDICARE

## 2021-03-02 RX ORDER — AMOXICILLIN 250 MG
CAPSULE ORAL
COMMUNITY
Start: 2020-09-30 | End: 2021-03-03 | Stop reason: ALTCHOICE

## 2021-03-02 RX ORDER — FERROUS SULFATE 325(65) MG
325 TABLET ORAL
COMMUNITY
Start: 2020-10-01 | End: 2021-03-03 | Stop reason: ALTCHOICE

## 2021-03-02 RX ORDER — B-COMPLEX WITH VITAMIN C
1 TABLET ORAL EVERY MORNING
COMMUNITY
End: 2022-07-12

## 2021-03-02 ASSESSMENT — LIFESTYLE VARIABLES
HOW OFTEN DURING THE LAST YEAR HAVE YOU BEEN UNABLE TO REMEMBER WHAT HAPPENED THE NIGHT BEFORE BECAUSE YOU HAD BEEN DRINKING: 0
HOW OFTEN DURING THE LAST YEAR HAVE YOU FOUND THAT YOU WERE NOT ABLE TO STOP DRINKING ONCE YOU HAD STARTED: NEVER
HOW OFTEN DO YOU HAVE SIX OR MORE DRINKS ON ONE OCCASION: NEVER
HOW OFTEN DO YOU HAVE SIX OR MORE DRINKS ON ONE OCCASION: 0
AUDIT TOTAL SCORE: 0
HOW OFTEN DURING THE LAST YEAR HAVE YOU FOUND THAT YOU WERE NOT ABLE TO STOP DRINKING ONCE YOU HAD STARTED: 0
AUDIT-C TOTAL SCORE: 0
HAVE YOU OR SOMEONE ELSE BEEN INJURED AS A RESULT OF YOUR DRINKING: NO
HOW OFTEN DURING THE LAST YEAR HAVE YOU NEEDED AN ALCOHOLIC DRINK FIRST THING IN THE MORNING TO GET YOURSELF GOING AFTER A NIGHT OF HEAVY DRINKING: NEVER
HOW OFTEN DURING THE LAST YEAR HAVE YOU BEEN UNABLE TO REMEMBER WHAT HAPPENED THE NIGHT BEFORE BECAUSE YOU HAD BEEN DRINKING: NEVER
HOW MANY STANDARD DRINKS CONTAINING ALCOHOL DO YOU HAVE ON A TYPICAL DAY: 0
HAS A RELATIVE, FRIEND, DOCTOR, OR ANOTHER HEALTH PROFESSIONAL EXPRESSED CONCERN ABOUT YOUR DRINKING OR SUGGESTED YOU CUT DOWN: NO
HOW OFTEN DURING THE LAST YEAR HAVE YOU FAILED TO DO WHAT WAS NORMALLY EXPECTED FROM YOU BECAUSE OF DRINKING: NEVER
HOW OFTEN DURING THE LAST YEAR HAVE YOU HAD A FEELING OF GUILT OR REMORSE AFTER DRINKING: NEVER
HOW OFTEN DO YOU HAVE A DRINK CONTAINING ALCOHOL: TWO TO FOUR TIMES A MONTH
AUDIT TOTAL SCORE: 2
HOW OFTEN DO YOU HAVE A DRINK CONTAINING ALCOHOL: 2
HOW MANY STANDARD DRINKS CONTAINING ALCOHOL DO YOU HAVE ON A TYPICAL DAY: ONE OR TWO
AUDIT-C TOTAL SCORE: 2
HOW OFTEN DURING THE LAST YEAR HAVE YOU HAD A FEELING OF GUILT OR REMORSE AFTER DRINKING: 0
HOW OFTEN DURING THE LAST YEAR HAVE YOU NEEDED AN ALCOHOLIC DRINK FIRST THING IN THE MORNING TO GET YOURSELF GOING AFTER A NIGHT OF HEAVY DRINKING: 0

## 2021-03-02 ASSESSMENT — PATIENT HEALTH QUESTIONNAIRE - PHQ9
1. LITTLE INTEREST OR PLEASURE IN DOING THINGS: 0
2. FEELING DOWN, DEPRESSED OR HOPELESS: 0
SUM OF ALL RESPONSES TO PHQ QUESTIONS 1-9: 0
SUM OF ALL RESPONSES TO PHQ QUESTIONS 1-9: 0

## 2021-03-03 ENCOUNTER — OFFICE VISIT (OUTPATIENT)
Dept: PRIMARY CARE CLINIC | Age: 71
End: 2021-03-03
Payer: MEDICARE

## 2021-03-03 VITALS
HEART RATE: 69 BPM | DIASTOLIC BLOOD PRESSURE: 86 MMHG | BODY MASS INDEX: 27.49 KG/M2 | WEIGHT: 181.4 LBS | SYSTOLIC BLOOD PRESSURE: 138 MMHG | HEIGHT: 68 IN | OXYGEN SATURATION: 97 %

## 2021-03-03 DIAGNOSIS — G25.81 RESTLESS LEG SYNDROME: ICD-10-CM

## 2021-03-03 DIAGNOSIS — R73.01 IMPAIRED FASTING BLOOD SUGAR: ICD-10-CM

## 2021-03-03 DIAGNOSIS — F33.41 RECURRENT MAJOR DEPRESSIVE DISORDER, IN PARTIAL REMISSION (HCC): ICD-10-CM

## 2021-03-03 DIAGNOSIS — Z00.00 ROUTINE GENERAL MEDICAL EXAMINATION AT A HEALTH CARE FACILITY: Primary | ICD-10-CM

## 2021-03-03 DIAGNOSIS — F51.01 PRIMARY INSOMNIA: ICD-10-CM

## 2021-03-03 DIAGNOSIS — E78.49 OTHER HYPERLIPIDEMIA: ICD-10-CM

## 2021-03-03 DIAGNOSIS — R10.13 DYSPEPSIA: ICD-10-CM

## 2021-03-03 DIAGNOSIS — M85.89 OSTEOPENIA OF MULTIPLE SITES: ICD-10-CM

## 2021-03-03 DIAGNOSIS — I70.0 AORTIC CALCIFICATION (HCC): ICD-10-CM

## 2021-03-03 DIAGNOSIS — R03.0 ELEVATED BLOOD PRESSURE READING IN OFFICE WITHOUT DIAGNOSIS OF HYPERTENSION: ICD-10-CM

## 2021-03-03 PROBLEM — Z86.718 PERSONAL HISTORY OF VENOUS THROMBOSIS AND EMBOLISM: Status: ACTIVE | Noted: 2019-10-29

## 2021-03-03 PROBLEM — Z86.711 HISTORY OF PULMONARY EMBOLISM: Status: ACTIVE | Noted: 2017-03-03

## 2021-03-03 PROBLEM — K21.9 GASTROESOPHAGEAL REFLUX DISEASE: Status: ACTIVE | Noted: 2021-03-03

## 2021-03-03 LAB — HBA1C MFR BLD: 5.1 %

## 2021-03-03 PROCEDURE — 99214 OFFICE O/P EST MOD 30 MIN: CPT | Performed by: FAMILY MEDICINE

## 2021-03-03 PROCEDURE — 83036 HEMOGLOBIN GLYCOSYLATED A1C: CPT | Performed by: FAMILY MEDICINE

## 2021-03-03 PROCEDURE — G0439 PPPS, SUBSEQ VISIT: HCPCS | Performed by: FAMILY MEDICINE

## 2021-03-03 RX ORDER — ROPINIROLE 0.5 MG/1
0.5 TABLET, FILM COATED ORAL NIGHTLY
Qty: 90 TABLET | Refills: 1 | Status: SHIPPED | OUTPATIENT
Start: 2021-03-03 | End: 2021-05-18 | Stop reason: SDUPTHER

## 2021-03-03 RX ORDER — ROPINIROLE 0.5 MG/1
TABLET, FILM COATED ORAL
COMMUNITY
Start: 2020-12-17 | End: 2021-03-03 | Stop reason: SDUPTHER

## 2021-03-03 RX ORDER — PANTOPRAZOLE SODIUM 40 MG/1
40 TABLET, DELAYED RELEASE ORAL DAILY
Qty: 90 TABLET | Refills: 1 | Status: SHIPPED | OUTPATIENT
Start: 2021-03-03 | End: 2021-08-23 | Stop reason: SDUPTHER

## 2021-03-03 RX ORDER — QUETIAPINE FUMARATE 25 MG/1
25 TABLET, FILM COATED ORAL DAILY
COMMUNITY
End: 2021-03-03 | Stop reason: SDUPTHER

## 2021-03-03 RX ORDER — BUPROPION HYDROCHLORIDE 300 MG/1
300 TABLET ORAL EVERY MORNING
Qty: 90 TABLET | Refills: 1 | Status: SHIPPED | OUTPATIENT
Start: 2021-03-03 | End: 2021-08-23 | Stop reason: SDUPTHER

## 2021-03-03 RX ORDER — QUETIAPINE FUMARATE 25 MG/1
25 TABLET, FILM COATED ORAL DAILY
Qty: 90 TABLET | Refills: 1 | Status: SHIPPED | OUTPATIENT
Start: 2021-03-03 | End: 2021-08-23 | Stop reason: SDUPTHER

## 2021-03-03 ASSESSMENT — ENCOUNTER SYMPTOMS
NAUSEA: 0
BACK PAIN: 0
SORE THROAT: 0
TROUBLE SWALLOWING: 0
COUGH: 0
RHINORRHEA: 0
SHORTNESS OF BREATH: 0
DIARRHEA: 0
ABDOMINAL PAIN: 0
BLOOD IN STOOL: 0
VOMITING: 0
VOICE CHANGE: 0
CONSTIPATION: 0
WHEEZING: 0

## 2021-03-03 NOTE — PROGRESS NOTES
Medicare Annual Wellness Visit  Name: Santos Youssef Date: 3/3/2021   MRN: [id number] Sex: Female   Age: 78 y.o. Ethnicity: Non-/Non    : 1950 Race: Jason Sr is here for Medicare AWV   and medical issue follow up. IFG- denies increased thirst and urination. Following low carb diet. Continues weight loss efforts. Elevated blood pressurehad a history of increased readings however, her blood pressure decreased with weight loss. Denies worsening headaches, chest pain, edema  Restless legs-dose was increased and patient finds improved symptom control further through the night. She does note onset of symptoms when she rests in the evening. Currently taking med at approx 9pm    insomnia- on seroquel-- reports occasional continued difficulty falling asleep but this is manageable    Depression- patient reports symptoms are well controlled. She has not diff with medication. Dyspepsia- on PPI. History gastric ulcer with NSAID use, continues need for nsaid with arthritis . rafael any abdominal pain, nausea, gerd    Migraines-  Headaches several times a month will use maxalt with good results. Does need 2 tablets typically for complete relief    Osteopenia  Reviewed Dexa-. She has had osteopenia for over 10 years with minimal further decrease in bone density. No previous medication use- initially was on HRT but this was stopped when she had PE. Aortic calcification seen on echocardiogram in 2016. History mild mitral regurgitation. Historymild tricuspid regurgitation     Colon completed approx   dexa-   Immunizations are UTD  She has completed hep c screening  Screenings for behavioral, psychosocial and functional/safety risks, and cognitive dysfunction are all negative except as indicated below. These results, as well as other patient data from the [employer] [address] form, are documented in Flowsheets linked to this Encounter.     Allergies FINGER FRACTURE SURGERY  11/2012    JOINT REPLACEMENT Right 2018    JOINT REPLACEMENT Left 2020    KNEE ARTHROSCOPY Right 2019    right    KNEE SURGERY  10/2019    open synovectomy    SHOULDER ARTHROPLASTY Right 03/22/2017    SHOULDER ARTHROPLASTY Left 2018       Family History   Problem Relation Age of Onset    Breast Cancer Mother     Lung Cancer Mother     Stroke Father     Kidney Disease Sister        CareTeam (Including outside providers/suppliers regularly involved in providing care):   Patient Care Team:  Rebekah Yi MD as PCP - General (Family Medicine)    Wt Readings from Last 3 Encounters:   03/03/21 181 lb 6.4 oz (82.3 kg)   04/06/18 199 lb 1.2 oz (90.3 kg)   11/28/17 199 lb 1.2 oz (90.3 kg)     Vitals:    03/03/21 1056   BP: 138/86   Site: Left Upper Arm   Position: Sitting   Cuff Size: Medium Adult   Pulse: 69   SpO2: 97%   Weight: 181 lb 6.4 oz (82.3 kg)   Height: 5' 8\" (1.727 m)     Body mass index is 27.58 kg/m². Based upon direct observation of the patient, evaluation of cognition reveals recent and remote memory intact. Review of Systems   Constitutional: Negative for activity change, appetite change, chills, diaphoresis, fatigue, fever and unexpected weight change. HENT: Negative for congestion, dental problem, ear pain, hearing loss, nosebleeds, postnasal drip, rhinorrhea, sore throat, tinnitus, trouble swallowing and voice change. Eyes: Negative for visual disturbance. Respiratory: Negative for cough, shortness of breath and wheezing. Cardiovascular: Negative for chest pain, palpitations and leg swelling. Gastrointestinal: Negative for abdominal pain, blood in stool, constipation, diarrhea, nausea and vomiting. Endocrine: Negative for cold intolerance, heat intolerance, polydipsia and polyuria. Genitourinary: Negative for difficulty urinating, dysuria, frequency, hematuria and urgency.    Musculoskeletal: Negative for arthralgias, back pain, joint swelling and neck pain. Skin: Negative for rash. Allergic/Immunologic: Negative for environmental allergies and food allergies. Neurological: Negative for dizziness, weakness, light-headedness and headaches. Hematological: Negative for adenopathy. Does not bruise/bleed easily. Psychiatric/Behavioral: Negative for behavioral problems, dysphoric mood, sleep disturbance and suicidal ideas. The patient is not nervous/anxious. Physical Exam  Vitals signs reviewed. Constitutional:       Appearance: Normal appearance. She is normal weight. HENT:      Head: Normocephalic and atraumatic. Right Ear: Tympanic membrane, ear canal and external ear normal.      Left Ear: Tympanic membrane, ear canal and external ear normal.      Nose: Nose normal.      Mouth/Throat:      Mouth: Mucous membranes are moist.      Pharynx: Oropharynx is clear. Eyes:      General: No scleral icterus. Extraocular Movements: Extraocular movements intact. Conjunctiva/sclera: Conjunctivae normal.      Pupils: Pupils are equal, round, and reactive to light. Neck:      Musculoskeletal: Neck supple. No neck rigidity or muscular tenderness. Cardiovascular:      Rate and Rhythm: Normal rate and regular rhythm. Pulses: Normal pulses. Heart sounds: S1 normal and S2 normal. Murmur present. Systolic murmur present. Pulmonary:      Effort: Pulmonary effort is normal.      Breath sounds: Normal breath sounds. No wheezing, rhonchi or rales. Abdominal:      General: Bowel sounds are normal.      Palpations: Abdomen is soft. Tenderness: There is no abdominal tenderness. There is no guarding or rebound. Lymphadenopathy:      Cervical: No cervical adenopathy. Skin:     General: Skin is warm and dry. Findings: No rash. Neurological:      General: No focal deficit present. Mental Status: She is alert and oriented to person, place, and time. Cranial Nerves: No cranial nerve deficit.       Sensory: No sensory deficit. Motor: No weakness. Coordination: Coordination normal.      Gait: Gait normal.      Deep Tendon Reflexes: Reflexes normal.   Psychiatric:         Mood and Affect: Mood normal.         Behavior: Behavior normal.         Thought Content: Thought content normal.         Judgment: Judgment normal.          Patient's complete Health Risk Assessment and screening values have been reviewed and are found in Flowsheets. The following problems were reviewed today and where indicated follow up appointments were made and/or referrals ordered. Positive Risk Factor Screenings with Interventions:            General Health and ACP:  General  In general, how would you say your health is?: Excellent  In the past 7 days, have you experienced any of the following?  New or Increased Pain, New or Increased Fatigue, Loneliness, Social Isolation, Stress or Anger?: None of These  Do you get the social and emotional support that you need?: Yes  Do you have a Living Will?: Yes  Advance Directives     Power of  Living Will ACP-Advance Directive ACP-Power of     Not on File Not on File Not on File Not on File      General Health Risk Interventions:            Personalized Preventive Plan   Current Health Maintenance Status  Immunization History   Administered Date(s) Administered    COVID-19, Pfizer, PF, 30mcg/0.3mL 02/08/2021    Influenza Virus Vaccine 09/24/2020    Influenza, Quadv, adjuvanted, 65 yrs +, IM, PF (Fluad) 09/24/2020    Pneumococcal Conjugate 13-valent (Kvsiobz22) 09/22/2017    Pneumococcal Polysaccharide (Ubrnrwuqi02) 08/01/2016    Tdap (Boostrix, Adacel) 12/01/2013    Zoster Live (Zostavax) 12/01/2013    Zoster Recombinant (Shingrix) 08/29/2020, 11/10/2020      Health Maintenance   Topic Date Due    Hepatitis C screen  Never done    Lipid screen  Never done    Colon cancer screen colonoscopy  Never done   [employer] Visit (AWV)  Never done    COVID-19 Vaccine (2 of 2 - efforts.  - QUEtiapine (SEROQUEL) 25 MG tablet; Take 1 tablet by mouth daily  Dispense: 90 tablet; Refill: 1    6. Osteopenia of multiple sites  Slight worsening on dexa but overall remains at osteopenia level. Do recommend strength /resistance exercises. Assess vit d level and determine if supplement needed. Do recommend adequate calcium intake with food and supplements if intake is below 1500 mg a day. - Vitamin D 25 Hydroxy; Future    7. Other hyperlipidemia  Recommend assess level of lipids. Known aortic calcification. Will determine ascvd and consider medication treatment. - Lipid Panel; Future    8. Recurrent major depressive disorder, in partial remission ([facility] [address]  Symptoms have been stable. Patient coping well with covid situation. Continue supportive measures. - buPROPion (WELLBUTRIN XL) 300 MG extended release tablet; Take 1 tablet by mouth every morning  Dispense: 90 tablet; Refill: 1    9. Elevated blood pressure  Blood pressure is increased today. Recommend home blood pressure monitoring. Recommend low sodium diet, at least 150 minutes of cardiovascular exercise each week. Recommend continued work toward weight loss. 10. Aortic calcification-  Echo in 2016 showed aortic calcification. Order to check lipids given. Continuing to monitor blood pressure and maintain control. Plan to repeat echo this year. Return in 6 months (on 9/3/2021) for routine follow up.     Recommended screening schedule for the next 5-10 years is provided to the patient in written form: see Patient Instructions/AVS.    Electronically signed by Rashmi Medrano MD on 3/3/21 at 8:53 PM.

## 2021-03-03 NOTE — PATIENT INSTRUCTIONS
Personalized Preventive Plan for Mari Grace - 3/3/2021  Medicare offers a range of preventive health benefits. Some of the tests and screenings are paid in full while other may be subject to a deductible, co-insurance, and/or copay. Some of these benefits include a comprehensive review of your medical history including lifestyle, illnesses that may run in your family, and various assessments and screenings as appropriate. After reviewing your medical record and screening and assessments performed today your provider may have ordered immunizations, labs, imaging, and/or referrals for you. A list of these orders (if applicable) as well as your Preventive Care list are included within your After Visit Summary for your review. Other Preventive Recommendations:    · A preventive eye exam performed by an eye specialist is recommended every 1-2 years to screen for glaucoma; cataracts, macular degeneration, and other eye disorders. · A preventive dental visit is recommended every 6 months. · Try to get at least 150 minutes of exercise per week or 10,000 steps per day on a pedometer . · Order or download the FREE \"Exercise & Physical Activity: Your Everyday Guide\" from [employer]. Call 1-147.892.2105 or search [employer] online. · You need 9023-3596 mg of calcium and 6568-0845 IU of vitamin D per day. It is possible to meet your calcium requirement with diet alone, but a vitamin D supplement is usually necessary to meet this goal.  · When exposed to the sun, use a sunscreen that protects against both UVA and UVB radiation with an SPF of 30 or greater. Reapply every 2 to 3 hours or after sweating, drying off with a towel, or swimming. · Always wear a seat belt when traveling in a car. Always wear a helmet when riding a bicycle or motorcycle.

## 2021-03-05 DIAGNOSIS — E78.49 OTHER HYPERLIPIDEMIA: ICD-10-CM

## 2021-03-05 DIAGNOSIS — R73.01 IMPAIRED FASTING BLOOD SUGAR: ICD-10-CM

## 2021-03-05 DIAGNOSIS — M85.89 OSTEOPENIA OF MULTIPLE SITES: ICD-10-CM

## 2021-03-05 LAB
A/G RATIO: 1.5 (ref 1.1–2.2)
ALBUMIN SERPL-MCNC: 4.5 G/DL (ref 3.4–5)
ALP BLD-CCNC: 78 U/L (ref 40–129)
ALT SERPL-CCNC: 15 U/L (ref 10–40)
ANION GAP SERPL CALCULATED.3IONS-SCNC: 12 MMOL/L (ref 3–16)
AST SERPL-CCNC: 18 U/L (ref 15–37)
BASOPHILS ABSOLUTE: 0 K/UL (ref 0–0.2)
BASOPHILS RELATIVE PERCENT: 1.1 %
BILIRUB SERPL-MCNC: 0.3 MG/DL (ref 0–1)
BUN BLDV-MCNC: 18 MG/DL (ref 7–20)
CALCIUM SERPL-MCNC: 10.5 MG/DL (ref 8.3–10.6)
CHLORIDE BLD-SCNC: 98 MMOL/L (ref 99–110)
CHOLESTEROL, TOTAL: 211 MG/DL (ref 0–199)
CO2: 27 MMOL/L (ref 21–32)
CREAT SERPL-MCNC: 0.6 MG/DL (ref 0.6–1.2)
EOSINOPHILS ABSOLUTE: 0.1 K/UL (ref 0–0.6)
EOSINOPHILS RELATIVE PERCENT: 2.2 %
GFR AFRICAN AMERICAN: >60
GFR NON-AFRICAN AMERICAN: >60
GLOBULIN: 3 G/DL
GLUCOSE BLD-MCNC: 95 MG/DL (ref 70–99)
HCT VFR BLD CALC: 38.1 % (ref 36–48)
HDLC SERPL-MCNC: 79 MG/DL (ref 40–60)
HEMOGLOBIN: 12.7 G/DL (ref 12–16)
LDL CHOLESTEROL CALCULATED: 123 MG/DL
LYMPHOCYTES ABSOLUTE: 1 K/UL (ref 1–5.1)
LYMPHOCYTES RELATIVE PERCENT: 29.2 %
MCH RBC QN AUTO: 30.9 PG (ref 26–34)
MCHC RBC AUTO-ENTMCNC: 33.3 G/DL (ref 31–36)
MCV RBC AUTO: 92.9 FL (ref 80–100)
MONOCYTES ABSOLUTE: 0.3 K/UL (ref 0–1.3)
MONOCYTES RELATIVE PERCENT: 10.5 %
NEUTROPHILS ABSOLUTE: 1.9 K/UL (ref 1.7–7.7)
NEUTROPHILS RELATIVE PERCENT: 57 %
PDW BLD-RTO: 13.3 % (ref 12.4–15.4)
PLATELET # BLD: 219 K/UL (ref 135–450)
PMV BLD AUTO: 8.3 FL (ref 5–10.5)
POTASSIUM SERPL-SCNC: 5.1 MMOL/L (ref 3.5–5.1)
RBC # BLD: 4.1 M/UL (ref 4–5.2)
SODIUM BLD-SCNC: 137 MMOL/L (ref 136–145)
TOTAL PROTEIN: 7.5 G/DL (ref 6.4–8.2)
TRIGL SERPL-MCNC: 43 MG/DL (ref 0–150)
VITAMIN D 25-HYDROXY: 74.5 NG/ML
VLDLC SERPL CALC-MCNC: 9 MG/DL
WBC # BLD: 3.3 K/UL (ref 4–11)

## 2021-03-09 PROBLEM — E78.00 PURE HYPERCHOLESTEROLEMIA: Status: ACTIVE | Noted: 2021-03-09

## 2021-03-11 ENCOUNTER — OFFICE VISIT (OUTPATIENT)
Dept: PRIMARY CARE CLINIC | Age: 71
End: 2021-03-11
Payer: MEDICARE

## 2021-03-11 VITALS
TEMPERATURE: 96.8 F | BODY MASS INDEX: 27.05 KG/M2 | HEART RATE: 72 BPM | SYSTOLIC BLOOD PRESSURE: 132 MMHG | HEIGHT: 69 IN | WEIGHT: 182.6 LBS | DIASTOLIC BLOOD PRESSURE: 78 MMHG

## 2021-03-11 DIAGNOSIS — R73.01 IMPAIRED FASTING BLOOD SUGAR: ICD-10-CM

## 2021-03-11 DIAGNOSIS — R03.0 ELEVATED BLOOD PRESSURE READING IN OFFICE WITHOUT DIAGNOSIS OF HYPERTENSION: ICD-10-CM

## 2021-03-11 DIAGNOSIS — E78.00 PURE HYPERCHOLESTEROLEMIA: Primary | ICD-10-CM

## 2021-03-11 PROBLEM — I73.00 RAYNAUD'S DISEASE: Status: ACTIVE | Noted: 2017-01-01

## 2021-03-11 PROBLEM — E78.49 OTHER HYPERLIPIDEMIA: Chronic | Status: RESOLVED | Noted: 2021-03-03 | Resolved: 2021-03-11

## 2021-03-11 PROCEDURE — 99214 OFFICE O/P EST MOD 30 MIN: CPT | Performed by: FAMILY MEDICINE

## 2021-03-11 RX ORDER — ATORVASTATIN CALCIUM 20 MG/1
20 TABLET, FILM COATED ORAL DAILY
Qty: 30 TABLET | Refills: 1 | Status: SHIPPED | OUTPATIENT
Start: 2021-03-11 | End: 2021-04-29 | Stop reason: SDUPTHER

## 2021-03-11 NOTE — PATIENT INSTRUCTIONS
Patient Education        Statins: Care Instructions  Your Care Instructions     Statins are medicines that lower your cholesterol and your risk for a heart attack and stroke. Cholesterol is a type of fat in your blood. If you have too much cholesterol, it can build up in blood vessels. This raises your risk of heart disease, heart attack, and stroke. Statins lower cholesterol by blocking how much your body makes. This prevents cholesterol from building up in your blood vessels. This is called hardening of the arteries. It is the starting point for some heart and blood flow problems, such as heart disease. Statins may also reduce inflammation around the buildup (called plaque). This can lower the risk that the plaque will break apart and lead to a heart attack or stroke. A heart-healthy lifestyle is important for lowering your risk whether you take statins or not. This includes eating healthy foods, being active, staying at a healthy weight, and not smoking. You must take statins regularly for them to work well. If you stop, your cholesterol and your risk will go back up. Examples of statins include:  · Atorvastatin (Lipitor). · Lovastatin (Mevacor). · Pravastatin (Pravachol). · Simvastatin (Zocor). Statins interact with many medicines. So tell your doctor all of the other medicines that you take. These include prescription medicines, over-the-counter medicines, dietary supplements, and herbal products. Follow-up care is a key part of your treatment and safety. Be sure to make and go to all appointments, and call your doctor if you are having problems. It's also a good idea to know your test results and keep a list of the medicines you take. How can you care for yourself at home? · Take statins exactly as your doctor tells you. High cholesterol has no symptoms. So it is easy to forget to take the pills. Try to make a system that reminds you to take them.   · Do not take two or more medicines at the same time unless the doctor told you to. Statins can interact with other medicines. · Always tell your doctor if you think you are having a side effect. If side effects are a problem with one medicine, a different one may be used. · Keep making the lifestyle changes your doctor suggests. Eat heart-healthy foods, be active, don't smoke, and stay at a healthy weight. · Talk to your doctor about avoiding grapefruit juice if you take statins. Grapefruit juice can raise the level of this medicine in your blood. This could increase side effects. When should you call for help? Watch closely for changes in your health, and be sure to contact your doctor if:    · You think you are having problems with your medicine.     · You have aches or muscle pain. Where can you learn more? Go to https://[email or web address]. org and sign in to your [employer] account. Enter R358 in the BUX box to learn more about \"Statins: Care Instructions. \"     If you do not have an account, please click on the \"Sign Up Now\" link. Current as of: August 31, 2020               Content Version: 12.8  © 2006-2021 [employer]. Care instructions adapted under license by [address] If you have questions about a medical condition or this instruction, always ask your healthcare professional. [address]  any warranty or liability for your use of this information.

## 2021-03-11 NOTE — PROGRESS NOTES
PROGRESS NOTE  Date of Service:  3/11/2021    SUBJECTIVE:  Patient ID: Molly Hobbs is a 78 y.o. female    HPI:   HPI   IFG- denies increased thirst and urination. Following low carb diet. Continues weight loss efforts. Recent fasting glucose 95. Elevated blood pressurehad a history of increased readings however, her blood pressure decreased with weight loss. Denies worsening headaches, chest pain, edema    Hyperlipidemia- no previous medication use, patient reports previous levels became elevated recently. she continues with her weight loss efforts. Patient reports she had an elevated CRP level several years ago when evaluated for knee issues. Restless legs-dose was increased and patient finds improved symptom control further through the night. She does note onset of symptoms when she rests in the evening. Currently taking med at approx 9pm    insomnia- on seroquel-- reports occasional continued difficulty falling asleep but this is manageable    Depression- patient reports symptoms are well controlled. She has not diff with medication. Dyspepsia- on PPI. History gastric ulcer with NSAID use, continues need for nsaid with arthritis . rafael any abdominal pain, nausea, gerd    Migraines-  Headaches several times a month will use maxalt with good results. Does need 2 tablets typically for complete relief    Osteopenia  Reviewed Dexa-2/21. She has had osteopenia for over 10 years with minimal further decrease in bone density. No previous medication use- initially was on HRT but this was stopped when she had PE. Aortic calcification seen on echocardiogram in 2016. History mild mitral regurgitation. Historymild tricuspid regurgitation       Patient's medications, allergies, past medical, surgical, social and family histories were reviewed and updated as appropriate. Review of Systems   All other systems reviewed and are negative.       OBJECTIVE:  Vitals:    03/11/21 1255   BP: 132/78 Site: Left Upper Arm   Position: Sitting   Cuff Size: Medium Adult   Pulse: 72   Temp: 96.8 °F (36 °C)   TempSrc: Temporal   Weight: 182 lb 9.6 oz (82.8 kg)   Height: 5' 9\" (1.753 m)      Body mass index is 26.97 kg/m². Physical Exam  Vitals signs reviewed. Constitutional:       Appearance: Normal appearance. She is normal weight. HENT:      Head: Normocephalic and atraumatic. Right Ear: External ear normal.      Left Ear: External ear normal.   Eyes:      General: No scleral icterus. Conjunctiva/sclera: Conjunctivae normal.   Neck:      Musculoskeletal: Neck supple. No neck rigidity or muscular tenderness. Pulmonary:      Breath sounds: No wheezing, rhonchi or rales. Abdominal:      Palpations: Abdomen is soft. Tenderness: There is no abdominal tenderness. There is no guarding or rebound. Lymphadenopathy:      Cervical: No cervical adenopathy. Skin:     Findings: No rash. Neurological:      General: No focal deficit present. Mental Status: She is alert and oriented to person, place, and time. Cranial Nerves: No cranial nerve deficit. Sensory: No sensory deficit. Motor: No weakness. Coordination: Coordination normal.      Gait: Gait normal.      Deep Tendon Reflexes: Reflexes normal.   Psychiatric:         Mood and Affect: Mood normal.         Behavior: Behavior normal.         Thought Content:  Thought content normal.         Judgment: Judgment normal.       Lab Results   Component Value Date    CHOL 211 (H) 03/05/2021     Lab Results   Component Value Date    TRIG 43 03/05/2021     Lab Results   Component Value Date    HDL 79 (H) 03/05/2021     Lab Results   Component Value Date    LDLCALC 123 (H) 03/05/2021     Lab Results   Component Value Date    LABVLDL 9 03/05/2021     No results found for: CHOLHDLRATIO   The 10-year ASCVD risk score (Kiran Byers et al., 2013) is: 7.5%    Values used to calculate the score:      Age: 78 years      Sex: Female      Is Non- : No      Diabetic: No      Tobacco smoker: No      Systolic Blood Pressure: 555 mmHg      Is BP treated: No      HDL Cholesterol: 79 mg/dL      Total Cholesterol: 211 mg/dL      ASSESSMENT:  1. Pure hypercholesterolemia    2. Elevated blood pressure reading in office without diagnosis of hypertension    3. Impaired fasting blood sugar         PLAN:   1. Pure hypercholesterolemia  Reviewed with patient risk calculator score at 8.1% which is above the threshold for recommendation of a statin medication. Recommend dietary changes and 150 minutes cardiovascular exercise per week. We will begin Lipitor, reviewed risks benefits and side effects. Follow-up in 6 weeks    - atorvastatin (LIPITOR) 20 MG tablet; Take 1 tablet by mouth daily  Dispense: 30 tablet; Refill: 1    2. Elevated blood pressure reading in office without diagnosis of hypertension  Continue weight loss efforts. Monitor blood pressure. Do continue low-salt diet. 3. Impaired fasting blood sugar  Recent fasting labs show fasting glucose glucose level of 95 with a hemoglobin A1c at 5.1% do continue to follow a lower carb diet with regular exercise to remain in the normal ranges. Return in about 6 weeks (around 4/22/2021).             Electronically signed by Minh Rice MD on 3/11/21 at 1:35 PM.

## 2021-03-18 ENCOUNTER — TELEPHONE (OUTPATIENT)
Dept: PRIMARY CARE CLINIC | Age: 71
End: 2021-03-18

## 2021-03-18 NOTE — TELEPHONE ENCOUNTER
----- Message from Carmita West MD sent at 3/18/2021 12:06 PM EDT -----  Regarding: colonoscopy  Please call DR Alicia Acevedo office to get her colonoscopy report from 2018 to try to capture before next monthly report on 3/22/21.  Thanks

## 2021-04-12 ENCOUNTER — TELEPHONE (OUTPATIENT)
Dept: PRIMARY CARE CLINIC | Age: 71
End: 2021-04-12

## 2021-04-12 DIAGNOSIS — E78.00 PURE HYPERCHOLESTEROLEMIA: Primary | ICD-10-CM

## 2021-04-12 DIAGNOSIS — I70.0 AORTIC CALCIFICATION (HCC): ICD-10-CM

## 2021-04-12 NOTE — TELEPHONE ENCOUNTER
-    ---- Message from Carlos Masters sent at 4/12/2021  1:10 PM EDT -----  Subject: Referral Request    QUESTIONS   Reason for referral request? Patient needs order put in for Cholesterol   check   Has the physician seen you for this condition before? Yes  Select a date? 2021-03-11  Select the physician (PCP or Specialist)? Jerome Montiel   Preferred Specialist (if applicable)? Jerome Montiel  Do you already have an appointment scheduled? Yes  Select Scheduled Date? 2021-04-22  Select Scheduled Physician? Jerome Montiel   Additional Information for Provider? Patient needs order put in for   Cholesterol check  ---------------------------------------------------------------------------  --------------  CALL BACK INFO  What is the best way for the office to contact you? OK to leave message on   voicemail  Preferred Call Back Phone Number?  [phone]

## 2021-04-13 NOTE — PROGRESS NOTES
PROGRESS NOTE  Date of Service:  4/22/2021    SUBJECTIVE:  Patient ID: Zeenat Ivan is a 73 y.o. female    HPI:   Interim history in bold  IFG- denies increased thirst and urination. Following low carb diet. Continues weight loss efforts. Recent fasting glucose 90s    Elevated blood pressurehad a history of increased readings however, her blood pressure decreased with weight loss. Denies worsening headaches, chest pain, edema    Hyperlipidemia-started atorvastatin and has not had any myalgias however she has noted worsening of her restless leg syndrome, knee pain pain at her hands, increasing frequency of headaches, and insomnia      Restless legs-dose was increased and patient finds improved symptom control further through the night. She does note onset of symptoms when she rests in the evening. Currently taking med at approx 9pm    insomnia- on seroquel-- reports occasional continued difficulty falling asleep but this is manageable    Depression- patient reports symptoms are well controlled. She has not diff with medication. Dyspepsia- on PPI. History gastric ulcer with NSAID use, continues need for nsaid with arthritis . rafael any abdominal pain, nausea, gerd    Migraines-  Headaches several times a month will use maxalt with good results. Does need 2 tablets typically for complete relief has had an increase in frequency of headaches however continues to respond to the Maxalt    Osteopenia  Reviewed Dexa-2/21. She has had osteopenia for over 10 years with minimal further decrease in bone density. No previous medication use- initially was on HRT but this was stopped when she had PE. Aortic calcification seen on echocardiogram in 2016. History mild mitral regurgitation. Historymild tricuspid regurgitation     Arthritispatient has known arthritis at her shoulder as well as knees which she has had replaced.   However she notes increased pain since the start of the atorvastatin at her shoulder as well as bilateral knees. She also notes increased pain especially at her right wrist and hand. She has not had new activities or injury    Constipationpatient has had many year history of constipation for which she has had evaluation in the past which were negative. She denies abdominal pain, pain with bowel movements however feels that she should have more stool. She has also had episodes of fecal incontinence which was most severe at her colonoscopy prep. This does not seem to be a regular issue, however she is concerned about increasing bowel movements and having difficulty with control. Patient's medications, allergies, past medical, surgical, social and family histories were reviewed and updated as appropriate. Review of Systems   All other systems reviewed and are negative. OBJECTIVE:  Vitals:    04/22/21 1016   BP: 112/74   Site: Left Upper Arm   Position: Sitting   Cuff Size: Medium Adult   Weight: 181 lb (82.1 kg)   Height: 5' 9\" (1.753 m)      Body mass index is 26.73 kg/m². Physical Exam  Vitals signs reviewed. Constitutional:       Appearance: Normal appearance. HENT:      Head: Normocephalic and atraumatic. Right Ear: External ear normal.      Left Ear: External ear normal.   Eyes:      Conjunctiva/sclera: Conjunctivae normal.   Neurological:      General: No focal deficit present. Mental Status: She is alert and oriented to person, place, and time. Psychiatric:         Mood and Affect: Mood normal.         Behavior: Behavior normal.         Thought Content: Thought content normal.         Judgment: Judgment normal.         ASSESSMENT:  1. Pure hypercholesterolemia    2. Restless leg syndrome    3. Migraine with aura and without status migrainosus, not intractable    4. Primary osteoarthritis of both hands    5. Constipation, unspecified constipation type         PLAN:   1.  Pure hypercholesterolemia  Recommend dietary changes and 150 minutes cardiovascular exercise per week. Reviewed blood work showing controlled lipids and normal liver function test.  Concerned however with increasing symptoms of restless legs, headaches, arthritis symptoms. Recommend patient hold her medication for this next week without making other changes and contact the office next week with an update on of those symptoms. If those are unchanged would continue the Lipitor at the 20 mg dose as we know this is controlling her levels. If however she is seeing improvement in those other issues then would consider a change of medication to Crestor at a lower dose. 2. Restless leg syndrome  Discussed with patient first holding the Lipitor and assessing for return to her typical symptoms. However if symptoms persist would recommend that we increase the Requip to 1 mg dose. 3. Migraine with aura and without status migrainosus, not intractable  Continue supportive measures for her migraines and assess if stopping atorvastatin improves frequency of headaches. 4. Primary osteoarthritis of both hands  We will hold atorvastatin for this next week and assess if arthritis symptoms are improved. If not then would recommend medication to control symptoms if needed. 5. Constipation, unspecified constipation type  Discussed with patient adding psyllium supplement which can also help with her LDL but for constipation she should begin the fiber supplement and advance slowly to full dosing. Do recommend that she make changes every 3 days and assess response. Hope to avoid loose stools as she has had difficulty with fecal incontinence. Discussed with patient evaluation of fecal incontinence if this occurs would be appropriate. No follow-ups on file.             Electronically signed by London García MD on 4/13/21 at 11:18 AM.

## 2021-04-14 DIAGNOSIS — I70.0 AORTIC CALCIFICATION (HCC): ICD-10-CM

## 2021-04-14 DIAGNOSIS — E78.00 PURE HYPERCHOLESTEROLEMIA: ICD-10-CM

## 2021-04-14 LAB
A/G RATIO: 1.9 (ref 1.1–2.2)
ALBUMIN SERPL-MCNC: 4.6 G/DL (ref 3.4–5)
ALP BLD-CCNC: 81 U/L (ref 40–129)
ALT SERPL-CCNC: 18 U/L (ref 10–40)
ANION GAP SERPL CALCULATED.3IONS-SCNC: 14 MMOL/L (ref 3–16)
AST SERPL-CCNC: 20 U/L (ref 15–37)
BILIRUB SERPL-MCNC: 0.4 MG/DL (ref 0–1)
BUN BLDV-MCNC: 20 MG/DL (ref 7–20)
C-REACTIVE PROTEIN: 5.5 MG/L (ref 0–5.1)
CALCIUM SERPL-MCNC: 9.9 MG/DL (ref 8.3–10.6)
CHLORIDE BLD-SCNC: 97 MMOL/L (ref 99–110)
CHOLESTEROL, TOTAL: 165 MG/DL (ref 0–199)
CO2: 24 MMOL/L (ref 21–32)
CREAT SERPL-MCNC: 0.6 MG/DL (ref 0.6–1.2)
GFR AFRICAN AMERICAN: >60
GFR NON-AFRICAN AMERICAN: >60
GLOBULIN: 2.4 G/DL
GLUCOSE BLD-MCNC: 100 MG/DL (ref 70–99)
HDLC SERPL-MCNC: 75 MG/DL (ref 40–60)
LDL CHOLESTEROL CALCULATED: 83 MG/DL
POTASSIUM SERPL-SCNC: 5.2 MMOL/L (ref 3.5–5.1)
SEDIMENTATION RATE, ERYTHROCYTE: 28 MM/HR (ref 0–30)
SODIUM BLD-SCNC: 135 MMOL/L (ref 136–145)
TOTAL PROTEIN: 7 G/DL (ref 6.4–8.2)
TRIGL SERPL-MCNC: 37 MG/DL (ref 0–150)
VLDLC SERPL CALC-MCNC: 7 MG/DL

## 2021-04-21 RX ORDER — ATORVASTATIN CALCIUM 20 MG/1
20 TABLET, FILM COATED ORAL DAILY
Qty: 30 TABLET | Refills: 1 | Status: CANCELLED | OUTPATIENT
Start: 2021-04-21

## 2021-04-22 ENCOUNTER — OFFICE VISIT (OUTPATIENT)
Dept: PRIMARY CARE CLINIC | Age: 71
End: 2021-04-22
Payer: MEDICARE

## 2021-04-22 VITALS
BODY MASS INDEX: 26.81 KG/M2 | WEIGHT: 181 LBS | HEIGHT: 69 IN | SYSTOLIC BLOOD PRESSURE: 112 MMHG | DIASTOLIC BLOOD PRESSURE: 74 MMHG

## 2021-04-22 DIAGNOSIS — G25.81 RESTLESS LEG SYNDROME: Chronic | ICD-10-CM

## 2021-04-22 DIAGNOSIS — M19.041 PRIMARY OSTEOARTHRITIS OF BOTH HANDS: ICD-10-CM

## 2021-04-22 DIAGNOSIS — M19.042 PRIMARY OSTEOARTHRITIS OF BOTH HANDS: ICD-10-CM

## 2021-04-22 DIAGNOSIS — E78.00 PURE HYPERCHOLESTEROLEMIA: Primary | Chronic | ICD-10-CM

## 2021-04-22 DIAGNOSIS — K59.00 CONSTIPATION, UNSPECIFIED CONSTIPATION TYPE: ICD-10-CM

## 2021-04-22 DIAGNOSIS — G43.109 MIGRAINE WITH AURA AND WITHOUT STATUS MIGRAINOSUS, NOT INTRACTABLE: ICD-10-CM

## 2021-04-22 PROBLEM — M19.011 PRIMARY OSTEOARTHRITIS OF RIGHT SHOULDER: Status: ACTIVE | Noted: 2018-09-06

## 2021-04-22 PROBLEM — M19.012 PRIMARY OSTEOARTHRITIS OF LEFT SHOULDER: Status: ACTIVE | Noted: 2018-09-06

## 2021-04-22 PROBLEM — M19.012 OSTEOARTHRITIS OF LEFT GLENOHUMERAL JOINT: Status: RESOLVED | Noted: 2018-04-06 | Resolved: 2021-04-22

## 2021-04-22 PROCEDURE — G8417 CALC BMI ABV UP PARAM F/U: HCPCS | Performed by: FAMILY MEDICINE

## 2021-04-22 PROCEDURE — 1090F PRES/ABSN URINE INCON ASSESS: CPT | Performed by: FAMILY MEDICINE

## 2021-04-22 PROCEDURE — 3017F COLORECTAL CA SCREEN DOC REV: CPT | Performed by: FAMILY MEDICINE

## 2021-04-22 PROCEDURE — 99214 OFFICE O/P EST MOD 30 MIN: CPT | Performed by: FAMILY MEDICINE

## 2021-04-22 PROCEDURE — 4040F PNEUMOC VAC/ADMIN/RCVD: CPT | Performed by: FAMILY MEDICINE

## 2021-04-22 PROCEDURE — 1036F TOBACCO NON-USER: CPT | Performed by: FAMILY MEDICINE

## 2021-04-22 PROCEDURE — G8427 DOCREV CUR MEDS BY ELIG CLIN: HCPCS | Performed by: FAMILY MEDICINE

## 2021-04-22 PROCEDURE — G8400 PT W/DXA NO RESULTS DOC: HCPCS | Performed by: FAMILY MEDICINE

## 2021-04-22 PROCEDURE — 1123F ACP DISCUSS/DSCN MKR DOCD: CPT | Performed by: FAMILY MEDICINE

## 2021-04-28 ENCOUNTER — TELEPHONE (OUTPATIENT)
Dept: PRIMARY CARE CLINIC | Age: 71
End: 2021-04-28

## 2021-04-28 NOTE — TELEPHONE ENCOUNTER
Pt said she was calling back to give an update status on how she is feeling , she said she is feeling fine, she has been off the Lipitor for 1 week and no difference. Please advise next step. Patient understands PCP is out of the office and would like this message to hold until PCP can address.

## 2021-04-29 DIAGNOSIS — E78.00 PURE HYPERCHOLESTEROLEMIA: ICD-10-CM

## 2021-04-29 RX ORDER — ATORVASTATIN CALCIUM 20 MG/1
20 TABLET, FILM COATED ORAL DAILY
Qty: 90 TABLET | Refills: 1 | Status: SHIPPED | OUTPATIENT
Start: 2021-04-29 | End: 2021-08-23 | Stop reason: SDUPTHER

## 2021-04-29 NOTE — TELEPHONE ENCOUNTER
Because there is no difference in her symptoms when off the Lipitor we know that her myalgias or not do to that medication. This dose had controlled her cholesterol well and so I would recommend that she continue this dose and I will send her refill for 90 days with 1 refill to her pharmacy. Continue to monitor the symptoms that she is having and if they persist or worsen do return to the office.

## 2021-05-18 DIAGNOSIS — G25.81 RESTLESS LEG SYNDROME: ICD-10-CM

## 2021-05-18 NOTE — TELEPHONE ENCOUNTER
Pt called said that Dr. Shawna Iverson had upped her to 2 tablets.   rOPINIRole (REQUIP) 0.5 MG tablet [[id number]]     Order Details  Dose: 0.5 mg     So she needs a new script sent in to   [address], [address] [place] [address] -  [phone]   [place] [address]   Phone:  [phone]  Fax:  [phone]

## 2021-05-18 NOTE — TELEPHONE ENCOUNTER
Medication:   Requested Prescriptions     Pending Prescriptions Disp Refills    rOPINIRole (REQUIP) 0.5 MG tablet 90 tablet 1     Sig: Take 2 tablets by mouth nightly        Last Filled:      Patient Phone Number: [phone] (home)     Last appt: 4/22/2021   Next appt: Visit date not found    Last OARRS: No flowsheet data found.

## 2021-05-21 RX ORDER — ROPINIROLE 0.5 MG/1
1 TABLET, FILM COATED ORAL NIGHTLY
Qty: 180 TABLET | Refills: 1 | Status: SHIPPED | OUTPATIENT
Start: 2021-05-21 | End: 2021-08-23

## 2021-07-21 DIAGNOSIS — G25.81 RESTLESS LEG SYNDROME: ICD-10-CM

## 2021-07-21 DIAGNOSIS — F51.01 PRIMARY INSOMNIA: ICD-10-CM

## 2021-07-21 NOTE — TELEPHONE ENCOUNTER
Please call pharmacy.  90 day with 1 refill sent 3/2021 therefore next rx is needed at her appt in [facility]

## 2021-07-21 NOTE — TELEPHONE ENCOUNTER
Medication:   Requested Prescriptions     Pending Prescriptions Disp Refills    QUEtiapine (SEROQUEL) 25 MG tablet 90 tablet 1     Sig: Take 1 tablet by mouth daily        Last Filled: [id number] #90 x 1     Patient Phone Number: [phone] (home)     Last appt: 4/22/2021   Next appt: Visit date not found    Last OARRS: No flowsheet data found.

## 2021-07-27 RX ORDER — QUETIAPINE FUMARATE 25 MG/1
25 TABLET, FILM COATED ORAL DAILY
Qty: 90 TABLET | Refills: 1 | OUTPATIENT
Start: 2021-07-27

## 2021-07-27 NOTE — TELEPHONE ENCOUNTER
Patient states that she is only taking once a day. She requested for them to be refilled b/c all her other medications were due for a refill?

## 2021-08-09 ENCOUNTER — TELEPHONE (OUTPATIENT)
Dept: PRIMARY CARE CLINIC | Age: 71
End: 2021-08-09

## 2021-08-09 DIAGNOSIS — E78.00 PURE HYPERCHOLESTEROLEMIA: ICD-10-CM

## 2021-08-09 DIAGNOSIS — R73.01 IMPAIRED FASTING BLOOD SUGAR: Primary | ICD-10-CM

## 2021-08-09 DIAGNOSIS — R10.13 DYSPEPSIA: ICD-10-CM

## 2021-08-09 NOTE — TELEPHONE ENCOUNTER
----- Message from [facility] sent at 8/9/2021 12:25 PM EDT -----  Subject: Referral Request    QUESTIONS   Reason for referral request? Patient is requesting Lab order to be sent   over before appointment on 08/18/2021 for 6 month follow up, questions   please call [phone]   Has the physician seen you for this condition before? No   Preferred Specialist (if applicable)? Do you already have an appointment scheduled? Yes  Select Scheduled Date? 2021-08-18  Select Scheduled Physician? Luiz Wyman   Additional Information for Provider?   ---------------------------------------------------------------------------  --------------  Mervin Rojos INFO  What is the best way for the office to contact you? OK to leave message on   voicemail  Preferred Call Back Phone Number?  [phone]

## 2021-08-22 NOTE — PROGRESS NOTES
PROGRESS NOTE  Date of Service:  8/23/2021    SUBJECTIVE:  Patient ID: Gladys Horvath is a 73 y.o. female    HPI:   IFG- denies increased thirst and urination. Following low carb diet. Continues weight loss efforts. Elevated blood pressurehad a history of increased readings however, her blood pressure decreased with weight loss. Hyperlipidemia- on atorvastatin but did have myalgias- not necessarily better after 1 week off      Restless legs- she had difficulty with restless legs while at a movie and has noted increased issues at night on occasion. No side effects from med    insomnia- on seroquel-- reports occasional continued difficulty falling asleep but this is manageable    Depression- patient reports symptoms are well controlled. She has not diff with medication. Dyspepsia- on PPI. History gastric ulcer with NSAID use, continues need for nsaid with arthritis . rafael any abdominal pain, nausea, gerd    Migraines-  Headaches several times a month will use maxalt with good results. Does need 2 tablets typically for complete relief has had an increase in frequency of headaches however continues to respond to the Maxalt  Only 1-2 times a month    Osteopenia -Dexa-2/21. She has had osteopenia for over 10 years with minimal further decrease in bone density. No previous medication use- initially was on HRT but this was stopped when she had PE. Aortic calcification seen on echocardiogram in 2016. History mild mitral regurgitation. Historymild tricuspid regurgitation     Arthritispatient has known arthritis at her shoulder as well as knees which she has had replaced. However she notes increased pain since the start of the atorvastatin at her shoulder as well as bilateral knees. She also notes increased pain especially at her right wrist and hand.   She has not had new activities or injury    Constipationpatient has had many year history of constipation for which she has had evaluation in the past which were negative. She denies abdominal pain, pain with bowel movements however feels that she should have more stool. She has also had episodes of fecal incontinence which was most severe at her colonoscopy prep. This does not seem to be a regular issue, however she is concerned about increasing bowel movements and having difficulty with control. Patient's medications, allergies, past medical, surgical, social and family histories were reviewed and updated as appropriate. OBJECTIVE:  Vitals:    08/23/21 1104   BP: 112/62   Site: Left Upper Arm   Position: Sitting   Cuff Size: Large Adult   Pulse: 56   Resp: 16   Temp: 97.6 °F (36.4 °C)   TempSrc: Temporal   SpO2: 99%   Weight: 176 lb 9.6 oz (80.1 kg)   Height: 5' 9\" (1.753 m)      Body mass index is 26.08 kg/m². Physical Exam  Vitals reviewed. Constitutional:       Appearance: Normal appearance. HENT:      Head: Normocephalic and atraumatic. Right Ear: External ear normal.      Left Ear: External ear normal.   Eyes:      Conjunctiva/sclera: Conjunctivae normal.      Pupils: Pupils are equal, round, and reactive to light. Cardiovascular:      Rate and Rhythm: Normal rate and regular rhythm. Pulses: Normal pulses. Heart sounds: Normal heart sounds. Pulmonary:      Effort: Pulmonary effort is normal.      Breath sounds: Normal breath sounds. Abdominal:      Palpations: Abdomen is soft. Neurological:      General: No focal deficit present. Mental Status: She is alert and oriented to person, place, and time. Psychiatric:         Mood and Affect: Mood normal.         Behavior: Behavior normal.         Thought Content: Thought content normal.         Judgment: Judgment normal.         ASSESSMENT:  1. Pure hypercholesterolemia    2. Restless leg syndrome    3. Primary insomnia    4. Impaired fasting blood sugar    5. Migraine with aura and without status migrainosus, not intractable    6.  Primary osteoarthritis of both hands    7. Elevated blood pressure reading in office without diagnosis of hypertension    8. Osteopenia of multiple sites    9. Recurrent major depressive disorder, in partial remission ([address]    10. Aortic calcification (HCC)    11. Raynaud's disease without gangrene    12. Dyspepsia         PLAN:   1. Pure hypercholesterolemia  She can do trial off for 2 weeks without other changes and assess muscle and joint pain; if better would recommend change in statin; if no change continue lipitor as it is controlling her lipids well  - atorvastatin (LIPITOR) 20 MG tablet; Take 1 tablet by mouth daily  Dispense: 90 tablet; Refill: 1    2. Restless leg syndrome  Will increase dosing to tid using as needed with daytime dosing with sedentary activities versus more medication at night.  - rOPINIRole (REQUIP) 0.5 MG tablet; Take 1 tablet by mouth 3 times daily  Dispense: 270 tablet; Refill: 1  - QUEtiapine (SEROQUEL) 25 MG tablet; Take 1 tablet by mouth daily  Dispense: 90 tablet; Refill: 1    3. Primary insomnia  Sleep hygiene measures  - QUEtiapine (SEROQUEL) 25 MG tablet; Take 1 tablet by mouth daily  Dispense: 90 tablet; Refill: 1    4. Impaired fasting blood sugar  Excellent overall control. Continue weight loss efforts,     5. Migraine with aura and without status migrainosus, not intractable  stable    6. Primary osteoarthritis of both hands  Supportive treatment prn    7. Elevated blood pressure reading in office without diagnosis of hypertension  Resolved with weight loss    8. Osteopenia of multiple sites  Continue vitadmin d and calcium supplements. Recommend resistance exercise    9. Recurrent major depressive disorder, in partial remission ([address]  Consider counseling; stressed importance of maintaining interaction with supportive friends or family. Recommend regular cardiovascular exercise and outdoor activities. If patient ever develops suicidal ideation, patient should call the office immediately.    - buPROPion (WELLBUTRIN XL) 300 MG extended release tablet; Take 1 tablet by mouth every morning  Dispense: 90 tablet; Refill: 1    10. Aortic calcification (HCC)  Blood pressure controlled, determining statin side effects    11. Raynaud's disease without gangrene  Supportive care    12. Dyspepsia  Determine and eliminate any food or beverage triggers; limit size of meals, limit eating within 3 hours of bed, elevate head of bed; weight loss. - pantoprazole (PROTONIX) 40 MG tablet; Take 1 tablet by mouth daily  Dispense: 90 tablet; Refill: 1      Return in about 6 months (around 2/23/2022).             Electronically signed by Diana Reynolds MD on 8/22/21 at 12:09 PM.

## 2021-08-23 ENCOUNTER — OFFICE VISIT (OUTPATIENT)
Dept: PRIMARY CARE CLINIC | Age: 71
End: 2021-08-23
Payer: MEDICARE

## 2021-08-23 VITALS
BODY MASS INDEX: 26.16 KG/M2 | WEIGHT: 176.6 LBS | DIASTOLIC BLOOD PRESSURE: 62 MMHG | HEART RATE: 56 BPM | TEMPERATURE: 97.6 F | SYSTOLIC BLOOD PRESSURE: 112 MMHG | HEIGHT: 69 IN | RESPIRATION RATE: 16 BRPM | OXYGEN SATURATION: 99 %

## 2021-08-23 DIAGNOSIS — G43.109 MIGRAINE WITH AURA AND WITHOUT STATUS MIGRAINOSUS, NOT INTRACTABLE: ICD-10-CM

## 2021-08-23 DIAGNOSIS — M19.042 PRIMARY OSTEOARTHRITIS OF BOTH HANDS: ICD-10-CM

## 2021-08-23 DIAGNOSIS — I73.00 RAYNAUD'S DISEASE WITHOUT GANGRENE: ICD-10-CM

## 2021-08-23 DIAGNOSIS — M85.89 OSTEOPENIA OF MULTIPLE SITES: Chronic | ICD-10-CM

## 2021-08-23 DIAGNOSIS — F33.41 RECURRENT MAJOR DEPRESSIVE DISORDER, IN PARTIAL REMISSION (HCC): Chronic | ICD-10-CM

## 2021-08-23 DIAGNOSIS — M19.041 PRIMARY OSTEOARTHRITIS OF BOTH HANDS: ICD-10-CM

## 2021-08-23 DIAGNOSIS — F51.01 PRIMARY INSOMNIA: ICD-10-CM

## 2021-08-23 DIAGNOSIS — I70.0 AORTIC CALCIFICATION (HCC): ICD-10-CM

## 2021-08-23 DIAGNOSIS — R73.01 IMPAIRED FASTING BLOOD SUGAR: ICD-10-CM

## 2021-08-23 DIAGNOSIS — E78.00 PURE HYPERCHOLESTEROLEMIA: Primary | ICD-10-CM

## 2021-08-23 DIAGNOSIS — R03.0 ELEVATED BLOOD PRESSURE READING IN OFFICE WITHOUT DIAGNOSIS OF HYPERTENSION: ICD-10-CM

## 2021-08-23 DIAGNOSIS — R10.13 DYSPEPSIA: ICD-10-CM

## 2021-08-23 DIAGNOSIS — G25.81 RESTLESS LEG SYNDROME: ICD-10-CM

## 2021-08-23 PROCEDURE — 1036F TOBACCO NON-USER: CPT | Performed by: FAMILY MEDICINE

## 2021-08-23 PROCEDURE — 1123F ACP DISCUSS/DSCN MKR DOCD: CPT | Performed by: FAMILY MEDICINE

## 2021-08-23 PROCEDURE — 1090F PRES/ABSN URINE INCON ASSESS: CPT | Performed by: FAMILY MEDICINE

## 2021-08-23 PROCEDURE — 4040F PNEUMOC VAC/ADMIN/RCVD: CPT | Performed by: FAMILY MEDICINE

## 2021-08-23 PROCEDURE — G8417 CALC BMI ABV UP PARAM F/U: HCPCS | Performed by: FAMILY MEDICINE

## 2021-08-23 PROCEDURE — G8427 DOCREV CUR MEDS BY ELIG CLIN: HCPCS | Performed by: FAMILY MEDICINE

## 2021-08-23 PROCEDURE — 99214 OFFICE O/P EST MOD 30 MIN: CPT | Performed by: FAMILY MEDICINE

## 2021-08-23 PROCEDURE — G8400 PT W/DXA NO RESULTS DOC: HCPCS | Performed by: FAMILY MEDICINE

## 2021-08-23 PROCEDURE — 3017F COLORECTAL CA SCREEN DOC REV: CPT | Performed by: FAMILY MEDICINE

## 2021-08-23 RX ORDER — BUPROPION HYDROCHLORIDE 300 MG/1
300 TABLET ORAL EVERY MORNING
Qty: 90 TABLET | Refills: 1 | Status: SHIPPED | OUTPATIENT
Start: 2021-08-23 | End: 2022-02-17 | Stop reason: SDUPTHER

## 2021-08-23 RX ORDER — ROPINIROLE 0.5 MG/1
0.5 TABLET, FILM COATED ORAL 3 TIMES DAILY
Qty: 270 TABLET | Refills: 1 | Status: SHIPPED | OUTPATIENT
Start: 2021-08-23 | End: 2022-02-17 | Stop reason: SDUPTHER

## 2021-08-23 RX ORDER — QUETIAPINE FUMARATE 25 MG/1
25 TABLET, FILM COATED ORAL DAILY
Qty: 90 TABLET | Refills: 1 | Status: SHIPPED | OUTPATIENT
Start: 2021-08-23 | End: 2022-02-17 | Stop reason: SDUPTHER

## 2021-08-23 RX ORDER — PANTOPRAZOLE SODIUM 40 MG/1
40 TABLET, DELAYED RELEASE ORAL DAILY
Qty: 90 TABLET | Refills: 1 | Status: SHIPPED | OUTPATIENT
Start: 2021-08-23 | End: 2022-02-17 | Stop reason: SDUPTHER

## 2021-08-23 RX ORDER — ATORVASTATIN CALCIUM 20 MG/1
20 TABLET, FILM COATED ORAL DAILY
Qty: 90 TABLET | Refills: 1 | Status: SHIPPED | OUTPATIENT
Start: 2021-08-23 | End: 2021-09-10 | Stop reason: SINTOL

## 2021-08-23 SDOH — ECONOMIC STABILITY: FOOD INSECURITY: WITHIN THE PAST 12 MONTHS, THE FOOD YOU BOUGHT JUST DIDN'T LAST AND YOU DIDN'T HAVE MONEY TO GET MORE.: NEVER TRUE

## 2021-08-23 SDOH — ECONOMIC STABILITY: FOOD INSECURITY: WITHIN THE PAST 12 MONTHS, YOU WORRIED THAT YOUR FOOD WOULD RUN OUT BEFORE YOU GOT MONEY TO BUY MORE.: NEVER TRUE

## 2021-08-23 ASSESSMENT — PATIENT HEALTH QUESTIONNAIRE - PHQ9
4. FEELING TIRED OR HAVING LITTLE ENERGY: 0
10. IF YOU CHECKED OFF ANY PROBLEMS, HOW DIFFICULT HAVE THESE PROBLEMS MADE IT FOR YOU TO DO YOUR WORK, TAKE CARE OF THINGS AT HOME, OR GET ALONG WITH OTHER PEOPLE: 1
3. TROUBLE FALLING OR STAYING ASLEEP: 1
SUM OF ALL RESPONSES TO PHQ9 QUESTIONS 1 & 2: 0
1. LITTLE INTEREST OR PLEASURE IN DOING THINGS: 0
SUM OF ALL RESPONSES TO PHQ QUESTIONS 1-9: 2
9. THOUGHTS THAT YOU WOULD BE BETTER OFF DEAD, OR OF HURTING YOURSELF: 0
8. MOVING OR SPEAKING SO SLOWLY THAT OTHER PEOPLE COULD HAVE NOTICED. OR THE OPPOSITE, BEING SO FIGETY OR RESTLESS THAT YOU HAVE BEEN MOVING AROUND A LOT MORE THAN USUAL: 1
SUM OF ALL RESPONSES TO PHQ QUESTIONS 1-9: 2
7. TROUBLE CONCENTRATING ON THINGS, SUCH AS READING THE NEWSPAPER OR WATCHING TELEVISION: 0
6. FEELING BAD ABOUT YOURSELF - OR THAT YOU ARE A FAILURE OR HAVE LET YOURSELF OR YOUR FAMILY DOWN: 0
2. FEELING DOWN, DEPRESSED OR HOPELESS: 0
5. POOR APPETITE OR OVEREATING: 0
SUM OF ALL RESPONSES TO PHQ QUESTIONS 1-9: 2

## 2021-08-23 ASSESSMENT — SOCIAL DETERMINANTS OF HEALTH (SDOH): HOW HARD IS IT FOR YOU TO PAY FOR THE VERY BASICS LIKE FOOD, HOUSING, MEDICAL CARE, AND HEATING?: NOT HARD AT ALL

## 2021-08-30 ENCOUNTER — TELEPHONE (OUTPATIENT)
Dept: PRIMARY CARE CLINIC | Age: 71
End: 2021-08-30

## 2021-08-30 NOTE — TELEPHONE ENCOUNTER
Attempted to contact- someone picked up the phone and hung up the phone without speaking. Will need to try back later.

## 2021-08-30 NOTE — TELEPHONE ENCOUNTER
----- Message from Nupur Walsh MD sent at 8/30/2021  9:04 AM EDT -----  Regarding: ER follow up  Please see how she is doing after her ER visit- sustained compression fracture. Do recommend a follow up appt here in future-- with a second compression fracture should consider using medications that help bone density.

## 2021-09-08 ENCOUNTER — TELEPHONE (OUTPATIENT)
Dept: PRIMARY CARE CLINIC | Age: 71
End: 2021-09-08

## 2021-09-08 NOTE — TELEPHONE ENCOUNTER
----- Message from [facility] sent at 9/8/2021  2:35 PM EDT -----  Subject: Referral Request    QUESTIONS   Reason for referral request? Pt is requesting an Endocrinologist for   further examination. Has the physician seen you for this condition before? No   Preferred Specialist (if applicable)? Do you already have an appointment scheduled? No  Additional Information for Provider?   ---------------------------------------------------------------------------  --------------  CALL BACK INFO  What is the best way for the office to contact you? OK to leave message on   voicemail  Preferred Call Back Phone Number?  [phone]

## 2021-09-09 NOTE — TELEPHONE ENCOUNTER
I had wanted her contacted after her ER visit however the message was closed before she was contacted. I do think she should be on a medication for bone density but she does not need to see an endocrinologist - it is something I manage.

## 2021-09-09 NOTE — PROGRESS NOTES
PROGRESS NOTE  Date of Service:  9/10/2021    SUBJECTIVE:  Patient ID: Ondina Davis is a 73 y.o. female    HPI:   On 8/28/21 she was bending over pulling a weed when the weed gave way and she fell backward sustaining a compression fracture of T10 and T12. She has had significant pan . SAw ortho this week. Considering kyphoplasty. Managing with pain medication. IFG- denies increased thirst and urination. Following low carb diet. Continues weight loss efforts. Elevated blood pressure-had a history of increased readings however, her blood pressure decreased with weight loss. Hyperlipidemia- stopped atorvastatin due to increased knee /leg pain and longer time off med did show improvement in pain. Restless legs- she had difficulty with restless legs while at a movie and has noted increased issues at night on occasion. No side effects from med    insomnia- on seroquel-- reports occasional continued difficulty falling asleep but this is manageable    Depression- patient reports symptoms are well controlled. She has not diff with medication. Dyspepsia- on PPI. History gastric ulcer with NSAID use, continues need for nsaid with arthritis . rafael any abdominal pain, nausea, gerd    Migraines-  Headaches several times a month will use maxalt with good results. Does need 2 tablets typically for complete relief has had an increase in frequency of headaches however continues to respond to the Maxalt  Only 1-2 times a month    Osteopenia -Dexa-2/21. She has had osteopenia for over 10 years with minimal further decrease in bone density. No previous medication use- initially was on HRT but this was stopped when she had PE. Aortic calcification seen on echocardiogram in 2016. History mild mitral regurgitation. History-mild tricuspid regurgitation     Arthritis-patient has known arthritis at her shoulder as well as knees which she has had replaced.       Constipation-with use of pain medication she Compression fracture of T10 vertebra with routine healing, subsequent encounter  Continue care per ortho. Discussed kyphoplasty    4. Compression fracture of T12 vertebra with routine healing, subsequent encounter  Continue care per ortho. Discussed kyphoplasty      Return 6 weeks after start of crestor.             Electronically signed by Jerald Stevens MD on 9/9/21 at 10:36 AM.

## 2021-09-10 ENCOUNTER — OFFICE VISIT (OUTPATIENT)
Dept: PRIMARY CARE CLINIC | Age: 71
End: 2021-09-10
Payer: MEDICARE

## 2021-09-10 VITALS
WEIGHT: 169 LBS | HEART RATE: 101 BPM | OXYGEN SATURATION: 100 % | DIASTOLIC BLOOD PRESSURE: 98 MMHG | HEIGHT: 69 IN | BODY MASS INDEX: 25.03 KG/M2 | SYSTOLIC BLOOD PRESSURE: 136 MMHG | RESPIRATION RATE: 18 BRPM | TEMPERATURE: 97.8 F

## 2021-09-10 DIAGNOSIS — E78.00 PURE HYPERCHOLESTEROLEMIA: ICD-10-CM

## 2021-09-10 DIAGNOSIS — S22.080A CLOSED WEDGE COMPRESSION FRACTURE OF T12 VERTEBRA, INITIAL ENCOUNTER (HCC): ICD-10-CM

## 2021-09-10 DIAGNOSIS — S22.070A CLOSED WEDGE COMPRESSION FRACTURE OF T10 VERTEBRA, INITIAL ENCOUNTER (HCC): ICD-10-CM

## 2021-09-10 DIAGNOSIS — M85.89 OSTEOPENIA OF MULTIPLE SITES: Primary | ICD-10-CM

## 2021-09-10 PROCEDURE — 1123F ACP DISCUSS/DSCN MKR DOCD: CPT | Performed by: FAMILY MEDICINE

## 2021-09-10 PROCEDURE — 1036F TOBACCO NON-USER: CPT | Performed by: FAMILY MEDICINE

## 2021-09-10 PROCEDURE — 4040F PNEUMOC VAC/ADMIN/RCVD: CPT | Performed by: FAMILY MEDICINE

## 2021-09-10 PROCEDURE — G8400 PT W/DXA NO RESULTS DOC: HCPCS | Performed by: FAMILY MEDICINE

## 2021-09-10 PROCEDURE — 99214 OFFICE O/P EST MOD 30 MIN: CPT | Performed by: FAMILY MEDICINE

## 2021-09-10 PROCEDURE — 1090F PRES/ABSN URINE INCON ASSESS: CPT | Performed by: FAMILY MEDICINE

## 2021-09-10 PROCEDURE — G8420 CALC BMI NORM PARAMETERS: HCPCS | Performed by: FAMILY MEDICINE

## 2021-09-10 PROCEDURE — G8427 DOCREV CUR MEDS BY ELIG CLIN: HCPCS | Performed by: FAMILY MEDICINE

## 2021-09-10 PROCEDURE — 3017F COLORECTAL CA SCREEN DOC REV: CPT | Performed by: FAMILY MEDICINE

## 2021-09-10 RX ORDER — ONDANSETRON 4 MG/1
4 TABLET, ORALLY DISINTEGRATING ORAL
COMMUNITY
Start: 2021-08-28 | End: 2021-11-29

## 2021-09-10 RX ORDER — HYDROCODONE BITARTRATE AND ACETAMINOPHEN 5; 325 MG/1; MG/1
1 TABLET ORAL EVERY 6 HOURS PRN
COMMUNITY
Start: 2021-09-07 | End: 2021-11-29

## 2021-09-10 RX ORDER — TRAMADOL HYDROCHLORIDE 50 MG/1
TABLET ORAL
COMMUNITY
Start: 2021-09-03 | End: 2021-11-29

## 2021-09-10 RX ORDER — CYCLOBENZAPRINE HCL 10 MG
10 TABLET ORAL 3 TIMES DAILY
COMMUNITY
Start: 2021-09-07 | End: 2021-12-01

## 2021-09-10 RX ORDER — ROSUVASTATIN CALCIUM 5 MG/1
5 TABLET, COATED ORAL NIGHTLY
Qty: 30 TABLET | Refills: 1 | Status: SHIPPED | OUTPATIENT
Start: 2021-09-10 | End: 2021-11-09 | Stop reason: SDUPTHER

## 2021-09-10 RX ORDER — METHOCARBAMOL 500 MG/1
500 TABLET, FILM COATED ORAL 2 TIMES DAILY PRN
COMMUNITY
Start: 2021-09-07 | End: 2021-12-01

## 2021-09-10 RX ORDER — IBUPROFEN 800 MG/1
800 TABLET ORAL
COMMUNITY
Start: 2021-08-28 | End: 2021-09-10

## 2021-09-10 RX ORDER — ACETAMINOPHEN 500 MG
1000 TABLET ORAL
COMMUNITY
Start: 2021-08-28

## 2021-09-10 RX ORDER — ALENDRONATE SODIUM 70 MG/1
70 TABLET ORAL
Qty: 12 TABLET | Refills: 1 | Status: SHIPPED | OUTPATIENT
Start: 2021-09-10 | End: 2022-02-17 | Stop reason: SDUPTHER

## 2021-09-10 NOTE — PATIENT INSTRUCTIONS
Patient Education        Learning About Low Bone Density  What is low bone density? Low bone density (sometimes called osteopenia) is a decrease in thickness, or density, in bones. That means the bones become thinner and weaker. It is much more common in women than in men. It's important to know that low bone density is not a disease. It can happen normally with aging. Having low bone density means that there is a greater risk that you may get osteoporosis. It also means that you are more likely to break a bone than someone who does not have low bone density. But not everyone with low bone density gets osteoporosis or breaks a bone. Low bone density doesn't cause any symptoms. It's usually found with a type of X-ray called a bone density test. Low bone density means that your bone density result (T-score) is between -1.0 and -2.5. What increases your risk for low bone density? Things that increase your risk include:  · Getting older. · Having a family history of osteoporosis. · Being thin. · Being white or . · Getting too little physical activity. · Smoking. · Drinking too much alcohol often. · Using certain medicines such as steroids. How can you prevent osteoporosis? There are things you can do to help prevent osteoporosis. Certain lifestyle changes will help slow the loss of bone density. · Eat food that has plenty of calcium and vitamin D. Yogurt, cheese, milk, and dark green vegetables are high in calcium. Eggs, fatty fish, cereal, and fortified milk are high in vitamin D.  · Ask your doctor if you need to take a calcium plus vitamin D supplement. You may be able to get enough calcium and vitamin D through your diet. · Get regular exercise. ? Do 30 minutes of weight-bearing exercise on most days of the week. Walking, jogging, stair climbing, and dancing are good choices. ? Do resistance exercises with weights or elastic bands 2 or 3 days a week.   · Limit alcohol to 2 drinks a day for men and 1 drink a day for women. Too much alcohol can cause health problems. · Do not smoke. Smoking can make bones thin faster. If you need help quitting, talk to your doctor about stop-smoking programs and medicines. These can increase your chances of quitting for good. Prescription medicines are available for treating low bone density. But these are more often used to treat osteoporosis. Follow-up care is a key part of your treatment and safety. Be sure to make and go to all appointments, and call your doctor if you are having problems. It's also a good idea to know your test results and keep a list of the medicines you take. Where can you learn more? Go to https://[employer] technologiespeCredorax.1Ring. org and sign in to your [employer] account. Enter E422 in the [employer] box to learn more about \"Learning About Low Bone Density. \"     If you do not have an account, please click on the \"Sign Up Now\" link. Current as of: December 7, 2020               Content Version: 12.9  © 2006-2021 [employer]wise[employer] Incorporated. Care instructions adapted under license by [facility] [place] ([facility]). If you have questions about a medical condition or this instruction, always ask your healthcare professional. [place] [address] any warranty or liability for your use of this information.

## 2021-11-03 DIAGNOSIS — E78.00 PURE HYPERCHOLESTEROLEMIA: ICD-10-CM

## 2021-11-03 DIAGNOSIS — E78.00 PURE HYPERCHOLESTEROLEMIA: Primary | ICD-10-CM

## 2021-11-03 RX ORDER — ROSUVASTATIN CALCIUM 5 MG/1
5 TABLET, COATED ORAL NIGHTLY
Qty: 30 TABLET | Refills: 1 | OUTPATIENT
Start: 2021-11-03

## 2021-11-03 NOTE — TELEPHONE ENCOUNTER
Medication:   Requested Prescriptions     Pending Prescriptions Disp Refills    rosuvastatin (CRESTOR) 5 MG tablet 30 tablet 1     Sig: Take 1 tablet by mouth nightly        Last Filled: 09/10/2021     Patient Phone Number: [phone] (home)     Last appt: 9/10/2021   Next appt: Visit date not found    Last OARRS: No flowsheet data found.

## 2021-11-04 DIAGNOSIS — E78.00 PURE HYPERCHOLESTEROLEMIA: ICD-10-CM

## 2021-11-04 LAB
A/G RATIO: 1.7 (ref 1.1–2.2)
ALBUMIN SERPL-MCNC: 4.3 G/DL (ref 3.4–5)
ALP BLD-CCNC: 72 U/L (ref 40–129)
ALT SERPL-CCNC: 20 U/L (ref 10–40)
ANION GAP SERPL CALCULATED.3IONS-SCNC: 13 MMOL/L (ref 3–16)
AST SERPL-CCNC: 22 U/L (ref 15–37)
BILIRUB SERPL-MCNC: 0.3 MG/DL (ref 0–1)
BUN BLDV-MCNC: 19 MG/DL (ref 7–20)
CALCIUM SERPL-MCNC: 9.8 MG/DL (ref 8.3–10.6)
CHLORIDE BLD-SCNC: 98 MMOL/L (ref 99–110)
CHOLESTEROL, FASTING: 158 MG/DL (ref 0–199)
CO2: 26 MMOL/L (ref 21–32)
CREAT SERPL-MCNC: 0.6 MG/DL (ref 0.6–1.2)
GFR AFRICAN AMERICAN: >60
GFR NON-AFRICAN AMERICAN: >60
GLUCOSE BLD-MCNC: 87 MG/DL (ref 70–99)
HDLC SERPL-MCNC: 91 MG/DL (ref 40–60)
LDL CHOLESTEROL CALCULATED: ABNORMAL MG/DL
LDL CHOLESTEROL DIRECT: 64 MG/DL
POTASSIUM SERPL-SCNC: 4.8 MMOL/L (ref 3.5–5.1)
SODIUM BLD-SCNC: 137 MMOL/L (ref 136–145)
TOTAL PROTEIN: 6.8 G/DL (ref 6.4–8.2)
TRIGLYCERIDE, FASTING: 23 MG/DL (ref 0–150)
VLDLC SERPL CALC-MCNC: ABNORMAL MG/DL

## 2021-11-09 ENCOUNTER — VIRTUAL VISIT (OUTPATIENT)
Dept: PRIMARY CARE CLINIC | Age: 71
End: 2021-11-09
Payer: MEDICARE

## 2021-11-09 DIAGNOSIS — E78.00 PURE HYPERCHOLESTEROLEMIA: Primary | ICD-10-CM

## 2021-11-09 DIAGNOSIS — M85.89 OSTEOPENIA OF MULTIPLE SITES: ICD-10-CM

## 2021-11-09 PROCEDURE — 1123F ACP DISCUSS/DSCN MKR DOCD: CPT | Performed by: FAMILY MEDICINE

## 2021-11-09 PROCEDURE — 1090F PRES/ABSN URINE INCON ASSESS: CPT | Performed by: FAMILY MEDICINE

## 2021-11-09 PROCEDURE — G8427 DOCREV CUR MEDS BY ELIG CLIN: HCPCS | Performed by: FAMILY MEDICINE

## 2021-11-09 PROCEDURE — 99214 OFFICE O/P EST MOD 30 MIN: CPT | Performed by: FAMILY MEDICINE

## 2021-11-09 PROCEDURE — 3017F COLORECTAL CA SCREEN DOC REV: CPT | Performed by: FAMILY MEDICINE

## 2021-11-09 PROCEDURE — 4040F PNEUMOC VAC/ADMIN/RCVD: CPT | Performed by: FAMILY MEDICINE

## 2021-11-09 PROCEDURE — G8400 PT W/DXA NO RESULTS DOC: HCPCS | Performed by: FAMILY MEDICINE

## 2021-11-09 RX ORDER — GABAPENTIN 300 MG/1
CAPSULE ORAL
COMMUNITY
Start: 2021-11-03 | End: 2022-07-10

## 2021-11-09 RX ORDER — ROSUVASTATIN CALCIUM 5 MG/1
5 TABLET, COATED ORAL NIGHTLY
Qty: 90 TABLET | Refills: 1 | Status: SHIPPED | OUTPATIENT
Start: 2021-11-09 | End: 2022-01-18 | Stop reason: SDUPTHER

## 2021-11-09 NOTE — PROGRESS NOTES
Alina Bernal (:  1950) is a 73 y.o. female,Established patient, here for evaluation of the following chief complaint(s): Results (medication review, [phone].)      ASSESSMENT/PLAN:  1. Pure hypercholesterolemia  -     rosuvastatin (CRESTOR) 5 MG tablet; Take 1 tablet by mouth nightly, Disp-90 tablet, R-1Normal  2. Osteopenia of multiple sites    Reviewed with patient that Fosamax initiated 2 weeks post fracture in treatment naïve patients for osteoporosis/osteopenia is standard. If there is worsening on her lumbar films then would certainly consider change in treatment. Would recommend appointment with endocrinology to review this in detail and consider Prolia injections. Discussed with her that there are endocrinologists locally within [facility] system as well as the other health systems which are more convenient for long-term care. Cholesterol is well controlled on the 5 mg dosing. She does continue her weight loss efforts and duration changes. Exercise is limited with current pain. Return in about 3 months (around 2022) for February would be her 6-month follow-up for all medical issues. SUBJECTIVE/OBJECTIVE:    On 21 she was bending over pulling a weed when the weed gave way and she fell backward sustaining a compression fracture of T10 and T12. She has had significant pain but side effects with oral medications. She has begun the process of ablation therapy with initial anesthetic being successful. She was to wait 2 weeks and repeat this test again before insurance allows coverage. She is managing her symptoms as well. She reports recent x-ray showed worsening of her fracture. There is no office note or dictated x-ray report. IFG- denies increased thirst and urination. Following low carb diet. Continues weight loss efforts. Elevated blood pressurehad a history of increased readings however, her blood pressure decreased with weight loss.       Hyperlipidemia- stopped atorvastatin due to increased knee /leg pain and longer time off med did show improvement in pain. She has been on Crestor 5 mg for the past 6 weeks. She reports no issue with myalgias or other side effects      Restless legs- she had difficulty with restless legs while at a movie and has noted increased issues at night on occasion. No side effects from med    insomnia- on seroquel-- reports occasional continued difficulty falling asleep but this is manageable    Depression- patient reports symptoms are well controlled. She has not diff with medication. Dyspepsia- on PPI. History gastric ulcer with NSAID use, continues need for nsaid with arthritis . rafael any abdominal pain, nausea, gerd    Migraines-  Headaches several times a month will use maxalt with good results. Does need 2 tablets typically for complete relief has had an increase in frequency of headaches however continues to respond to the Maxalt  Only 1-2 times a month    Osteopenia -Dexa-2/21. She has had osteopenia for over 10 years with minimal further decrease in bone density. No previous medication use- initially was on HRT but this was stopped when she had PE. Currently on Fosamax    Aortic calcification seen on echocardiogram in 2016. History mild mitral regurgitation. Historymild tricuspid regurgitation     Arthritispatient has known arthritis at her shoulder as well as knees which she has had replaced. Constipationwith use of pain medication she has had increased difficulty with constipation. She did not have BM for 1 week, felt bloated, distended.  Tried stool softeners , fiber and then used mag citrate which was efective    Lab Results   Component Value Date    CHOL 165 04/14/2021    CHOL 211 (H) 03/05/2021     Lab Results   Component Value Date    TRIG 37 04/14/2021    TRIG 43 03/05/2021     Lab Results   Component Value Date    HDL 91 (H) 11/04/2021    HDL 76 (H) 08/11/2021    HDL 75 (H) 04/14/2021     Lab Results   Component Value Date    LDLCALC see below 11/04/2021 [address] 77 08/11/2021 [address] 83 04/14/2021     Lab Results   Component Value Date    LABVLDL see below 11/04/2021    LABVLDL 11 08/11/2021    LABVLDL 7 04/14/2021     No results found for: CHOLHDLRATIO  [INSTRUCTIONS:  \"[x]\" Indicates a positive item  \"[]\" Indicates a negative item  -- DELETE ALL ITEMS NOT EXAMINED]    Constitutional: [x] Appears well-developed and well-nourished [x] No apparent distress      [] Abnormal -     Mental status: [x] Alert and awake  [x] Oriented to person/place/time [x] Able to follow commands    [] Abnormal -     Eyes:   EOM    [x]  Normal    [] Abnormal -   Sclera  [x]  Normal    [] Abnormal -          Discharge [x]  None visible   [] Abnormal -     HENT: [x] Normocephalic, atraumatic  [] Abnormal -   [x] Mouth/Throat: Mucous membranes are moist    External Ears [x] Normal  [] Abnormal -    Neck: [x] No visualized mass [] Abnormal -     Pulmonary/Chest: [x] Respiratory effort normal   [x] No visualized signs of difficulty breathing or respiratory distress        [] Abnormal -      Musculoskeletal:   [x] Normal gait with no signs of ataxia         [x] Normal range of motion of neck        [] Abnormal -     Neurological:        [x] No Facial Asymmetry (Cranial nerve 7 motor function) (limited exam due to video visit)          [x] No gaze palsy        [] Abnormal -          Skin:        [x] No significant exanthematous lesions or discoloration noted on facial skin         [] Abnormal -            Psychiatric:       [x] Normal Affect [] Abnormal -        [x] No Hallucinations    Other pertinent observable physical exam findings:-    Electronically signed by Camila Torres MD on 11/9/2021 at 10:55 AM     Please note this chart was generated using dragon dictation software. Although every effort was made to ensure the accuracy of this automated transcription, some errors in transcription may have occurred.      Isabell Wood, was evaluated through a synchronous (real-time) audio-video encounter. The patient (or guardian if applicable) is aware that this is a billable service. Verbal consent to proceed has been obtained within the past 12 months. The visit was conducted pursuant to the emergency declaration under the [address] [place] [address], [address] [place] [address] authority and the [employer] Act. Patient identification was verified, and a caregiver was present when appropriate. The patient was located in a state where the provider was credentialed to provide care.

## 2021-11-22 DIAGNOSIS — R30.0 DYSURIA: ICD-10-CM

## 2021-11-22 LAB
BACTERIA: ABNORMAL /HPF
BILIRUBIN URINE: NEGATIVE
BLOOD, URINE: ABNORMAL
CLARITY: ABNORMAL
COLOR: YELLOW
EPITHELIAL CELLS, UA: 2 /HPF (ref 0–5)
GLUCOSE URINE: NEGATIVE MG/DL
HYALINE CASTS: 1 /LPF (ref 0–8)
KETONES, URINE: NEGATIVE MG/DL
LEUKOCYTE ESTERASE, URINE: ABNORMAL
MICROSCOPIC EXAMINATION: YES
NITRITE, URINE: POSITIVE
PH UA: 6.5 (ref 5–8)
PROTEIN UA: NEGATIVE MG/DL
RBC UA: 1 /HPF (ref 0–4)
SPECIFIC GRAVITY UA: 1.01 (ref 1–1.03)
URINE REFLEX TO CULTURE: YES
URINE TYPE: ABNORMAL
UROBILINOGEN, URINE: 0.2 E.U./DL
WBC UA: 120 /HPF (ref 0–5)

## 2021-11-23 ENCOUNTER — TELEPHONE (OUTPATIENT)
Dept: PRIMARY CARE CLINIC | Age: 71
End: 2021-11-23

## 2021-11-23 RX ORDER — NITROFURANTOIN 25; 75 MG/1; MG/1
100 CAPSULE ORAL 2 TIMES DAILY
Qty: 20 CAPSULE | Refills: 0 | Status: SHIPPED | OUTPATIENT
Start: 2021-11-23 | End: 2021-12-03

## 2021-11-24 LAB
ORGANISM: ABNORMAL
URINE CULTURE, ROUTINE: ABNORMAL

## 2021-11-29 ENCOUNTER — OFFICE VISIT (OUTPATIENT)
Dept: PRIMARY CARE CLINIC | Age: 71
End: 2021-11-29
Payer: MEDICARE

## 2021-11-29 VITALS
DIASTOLIC BLOOD PRESSURE: 72 MMHG | BODY MASS INDEX: 26.14 KG/M2 | SYSTOLIC BLOOD PRESSURE: 126 MMHG | OXYGEN SATURATION: 95 % | HEART RATE: 69 BPM | WEIGHT: 177 LBS

## 2021-11-29 DIAGNOSIS — N12 PYELONEPHRITIS: Primary | ICD-10-CM

## 2021-11-29 DIAGNOSIS — R31.9 HEMATURIA, UNSPECIFIED TYPE: ICD-10-CM

## 2021-11-29 PROCEDURE — G8427 DOCREV CUR MEDS BY ELIG CLIN: HCPCS | Performed by: FAMILY MEDICINE

## 2021-11-29 PROCEDURE — G8400 PT W/DXA NO RESULTS DOC: HCPCS | Performed by: FAMILY MEDICINE

## 2021-11-29 PROCEDURE — G8484 FLU IMMUNIZE NO ADMIN: HCPCS | Performed by: FAMILY MEDICINE

## 2021-11-29 PROCEDURE — 1090F PRES/ABSN URINE INCON ASSESS: CPT | Performed by: FAMILY MEDICINE

## 2021-11-29 PROCEDURE — 1036F TOBACCO NON-USER: CPT | Performed by: FAMILY MEDICINE

## 2021-11-29 PROCEDURE — 1123F ACP DISCUSS/DSCN MKR DOCD: CPT | Performed by: FAMILY MEDICINE

## 2021-11-29 PROCEDURE — 99214 OFFICE O/P EST MOD 30 MIN: CPT | Performed by: FAMILY MEDICINE

## 2021-11-29 PROCEDURE — G8417 CALC BMI ABV UP PARAM F/U: HCPCS | Performed by: FAMILY MEDICINE

## 2021-11-29 PROCEDURE — 4040F PNEUMOC VAC/ADMIN/RCVD: CPT | Performed by: FAMILY MEDICINE

## 2021-11-29 PROCEDURE — 3017F COLORECTAL CA SCREEN DOC REV: CPT | Performed by: FAMILY MEDICINE

## 2021-11-29 RX ORDER — CEFUROXIME AXETIL 250 MG/1
TABLET ORAL
COMMUNITY
Start: 2021-11-23 | End: 2022-03-14

## 2021-12-01 ENCOUNTER — TELEPHONE (OUTPATIENT)
Dept: PRIMARY CARE CLINIC | Age: 71
End: 2021-12-01

## 2021-12-01 DIAGNOSIS — M85.89 OSTEOPENIA OF MULTIPLE SITES: Primary | ICD-10-CM

## 2021-12-01 DIAGNOSIS — S22.070A CLOSED WEDGE COMPRESSION FRACTURE OF T10 VERTEBRA, INITIAL ENCOUNTER (HCC): ICD-10-CM

## 2021-12-01 NOTE — TELEPHONE ENCOUNTER
The pt found [address] (referral) to see her for her condition. The  that she previously was told to see doesn't specialize in what she needs.  She can be reached at [phone]

## 2021-12-01 NOTE — TELEPHONE ENCOUNTER
Please let her know that I placed a referral to Dr. Angelica Stevenson. Please give her the office information to schedule an appointment.   Thank you

## 2021-12-03 ENCOUNTER — TELEPHONE (OUTPATIENT)
Dept: ENDOCRINOLOGY | Age: 71
End: 2021-12-03

## 2021-12-14 ENCOUNTER — TELEPHONE (OUTPATIENT)
Dept: ENDOCRINOLOGY | Age: 71
End: 2021-12-14

## 2021-12-20 DIAGNOSIS — R31.9 HEMATURIA, UNSPECIFIED TYPE: ICD-10-CM

## 2021-12-20 LAB
BACTERIA: ABNORMAL /HPF
BILIRUBIN URINE: NEGATIVE
BLOOD, URINE: ABNORMAL
CLARITY: ABNORMAL
COLOR: YELLOW
EPITHELIAL CELLS, UA: 1 /HPF (ref 0–5)
GLUCOSE URINE: NEGATIVE MG/DL
HYALINE CASTS: 0 /LPF (ref 0–8)
KETONES, URINE: NEGATIVE MG/DL
LEUKOCYTE ESTERASE, URINE: ABNORMAL
MICROSCOPIC EXAMINATION: YES
NITRITE, URINE: POSITIVE
PH UA: 6.5 (ref 5–8)
PROTEIN UA: NEGATIVE MG/DL
RBC UA: 1 /HPF (ref 0–4)
SPECIFIC GRAVITY UA: 1.01 (ref 1–1.03)
URINE TYPE: ABNORMAL
UROBILINOGEN, URINE: 0.2 E.U./DL
WBC UA: 187 /HPF (ref 0–5)

## 2022-01-06 ENCOUNTER — OFFICE VISIT (OUTPATIENT)
Dept: ENDOCRINOLOGY | Age: 72
End: 2022-01-06
Payer: MEDICARE

## 2022-01-06 VITALS
DIASTOLIC BLOOD PRESSURE: 76 MMHG | SYSTOLIC BLOOD PRESSURE: 137 MMHG | WEIGHT: 178.4 LBS | BODY MASS INDEX: 28.67 KG/M2 | HEIGHT: 66 IN

## 2022-01-06 DIAGNOSIS — M80.08XD VERTEBRAL FRACTURE, OSTEOPOROTIC, WITH ROUTINE HEALING, SUBSEQUENT ENCOUNTER: ICD-10-CM

## 2022-01-06 DIAGNOSIS — Z51.81 MEDICATION MONITORING ENCOUNTER: ICD-10-CM

## 2022-01-06 DIAGNOSIS — M81.0 OSTEOPOROSIS, POSTMENOPAUSAL: Primary | ICD-10-CM

## 2022-01-06 PROCEDURE — 99205 OFFICE O/P NEW HI 60 MIN: CPT | Performed by: INTERNAL MEDICINE

## 2022-01-06 PROCEDURE — G8417 CALC BMI ABV UP PARAM F/U: HCPCS | Performed by: INTERNAL MEDICINE

## 2022-01-06 PROCEDURE — 1036F TOBACCO NON-USER: CPT | Performed by: INTERNAL MEDICINE

## 2022-01-06 PROCEDURE — G8400 PT W/DXA NO RESULTS DOC: HCPCS | Performed by: INTERNAL MEDICINE

## 2022-01-06 PROCEDURE — G8484 FLU IMMUNIZE NO ADMIN: HCPCS | Performed by: INTERNAL MEDICINE

## 2022-01-06 PROCEDURE — 1123F ACP DISCUSS/DSCN MKR DOCD: CPT | Performed by: INTERNAL MEDICINE

## 2022-01-06 PROCEDURE — 3017F COLORECTAL CA SCREEN DOC REV: CPT | Performed by: INTERNAL MEDICINE

## 2022-01-06 PROCEDURE — G8427 DOCREV CUR MEDS BY ELIG CLIN: HCPCS | Performed by: INTERNAL MEDICINE

## 2022-01-06 PROCEDURE — 4040F PNEUMOC VAC/ADMIN/RCVD: CPT | Performed by: INTERNAL MEDICINE

## 2022-01-06 PROCEDURE — 1090F PRES/ABSN URINE INCON ASSESS: CPT | Performed by: INTERNAL MEDICINE

## 2022-01-06 PROCEDURE — G2212 PROLONG OUTPT/OFFICE VIS: HCPCS | Performed by: INTERNAL MEDICINE

## 2022-01-06 RX ORDER — GABAPENTIN 400 MG/1
CAPSULE ORAL
COMMUNITY
Start: 2021-12-29

## 2022-01-06 NOTE — PROGRESS NOTES
[facility] Osteoporosis and [address] [facility]  [address], [address] [facility],[place] [address]  Phone [phone]  Fax [phone]    NAME:  Deb Das  :  1950  CONSULT DATE:    2022  MOST RECENT VISIT:  2022  TODAYS DATE:  2022    Labs @ [facility] 2021    CONSULTATION REQUESTED BY: Claudette Luo MD  OTHERS WHO NEED REPORTS: Dominique Bush MD      PROBLEMS. Low bone density by DXA 2018, T-scores -1.3 in the spine, -0.5 in the right femoral neck    Family history of osteoporosis, mother    , age 61, T10 fx (fell)    2021 fx T10 (40%), T12 (40%); fell    3 height loss    BMD decreased 4090-5633, T-scores -1.8 in the spine, -0.6 in the right femoral neck  Natural menopause age 56, estrogen x 2 years, stopped due to pulmonary emboli  Shoulder replacements right 2017, left 10/2018  Knee replacements right 2018, left 2020    CURRENT MANAGEMENT FOR BONE HEALTH/OSTEOPOROSIS. Calcium, 300 mg from low calcium foods,  150 mg milk, 300 mg cheese, 200 mg dk green vegs   diet MVI Ca+D other total    Calcium 950 220 975   mg/d   Vitamin D  1000 3000 2000 with Mg  IU/d     25-OH D 75 ng/mL 2021 (desirable is 30-60 ng/mL)  Exercise, walks 45-60 min 3-4 x weekly, recumbent bike  Pharmacologic therapy: Fosamax started 2021    PREVIOUS BONE-ACTIVE MEDICATIONS. none    OTHER CURRENT MEDICATIONS (SELECTED): Wellbutrin, Protonix, Seroquel, Requip, Crestor  OTC MEDICATIONS (SELECTED): magnesium, probiotic, B complex    CHIEF COMPLAINT. Recent compression fractures    HISTORY OF PRESENT ILLNESS: See problem list for chronic/inactive conditions. Ms. Paz Appiah is a 72-year-old woman who was found to have low by DXA in 2018. Vertebral fractures  and 2021. Pain has largely resolved, still has some pain after being up an hour or so; sounds like paraspinal muscle spasm.     FOR FULL DETAILS OF FAMILY HISTORY, PAST MEDICAL AND SURGICAL HISTORY, SOCIAL HISTORY, AND REVIEW OF SYSTEMS, SEE PATIENT QUESTIONNAIRE OF TODAYS DATE. FAMILY HISTORY. Relevant hx in problem list and/or HPI. Otherwise not contributory. PAST MEDICAL HISTORY. Noted in health history form. PAST SURGICAL HISTORY. Noted in health history form. SOCIAL HISTORY. Past smoker (quit 01/1979). No excessive intake of alcohol, caffeine or sodas. Lives with her spouse. REVIEW OF SYSTEMS. Maximum adult height 69. No significant height loss. Usual weight 170-175#. No recent significant change in weight. PHYSICAL EXAMINATION. GENERAL. Well-nourished, well-developed, normally proportioned adult. MENTAL STATUS. Pleasant mood. Oriented to time, place, and person. ORAL. Teeth appear to be in good condition. SKIN. Normal texture and turgor. LUNGS. Clear to auscultation. Breath sounds normal.  HEART. Heart sounds normal, no murmur or gallop. MUSCULOSKELETAL. The examination included inspection/palpation (any misalignment, asymmetry, crepitation, defects, tenderness, masses, effusions is noted), assessment of range of motion (any presence of pain, crepitation, contracture is noted), assessment of stability (any dislocation, subluxation, laxity is noted), assessment of muscle strength and tone (any atrophy or abnormal movements is noted). Pelvis appears normal.  Spinal contours are normal.  No spine tenderness to palpation or percussion. One finger space between ribs and pelvis. Gait steady without assistance. NEUROLOGICAL. Able to rise from chair without using arms. No apparent motor or sensory deficit. Coordination appears normal     BONE DENSITY. Most recent done [address] equipment. T-scores   Initial study: 12/13/2018 L1-L4 -1.3 right fem. neck -0.5   Current study: 02/22/2021 L1-L4 -1.8 right fem. neck -0.6     The table below shows bone mineral density (grams/cm2), the appropriate measure for comparing serial scans.  A significant increase or decrease is based on precision studies done at our center according to the ISCD protocol with a least significant change of 0.030 g/cm2. PA spine Proximal Femur (right)   Date L1-L4  Fem. neck Trochanter Total hip   12/13/2018 0.899 0.794 --- 0.887   02/22/2021 0.848 0.787 --- 0.908     Labs: 11/2021 Ca 9.8 Cr 0.6. Imaging: DXA printouts reviewed. ASSESSMENT. Postmenopausal osteoporosis. Although BMD is in the osteopenia range, because of her vertebral fractures, the correct clinical diagnosis is osteoporosis.     THERAPEUTIC PLANS. Calcium, target 1200 mg daily; we discussed recommended calcium intake and the effects of excessive calcium intake from supplements. I provided a handout with information about how to calculate daily calcium intake. Advised to reduce calcium supplements   Vitamin D, reducing vitamin D by stopping magnesium and reducing calcium supplements should be OK. Exercise, Recommended weight-bearing exercise (walking or equivalent) 30-40 minutes per session, 3 or 4 sessions a week and resistance exercise. Advised to take care to avoid injury (high impact, falling, etc). We discussed avoiding activities that place compressive forces on the spine; specifically pushing, pulling, bending, lifting and twisting to help prevent vertebral fractures. Pharmacologic therapy, we discussed long-term treatment options for osteoporosis that have evidence for broad spectrum anti-fracture efficacy (reduce the risk of vertebral, hip and other nonvertebral fractures); alendronate (Fosamax), zoledronate (Reclast) and Prolia (denosumab). I explained dosing instructions, the mechanism of action, side effects (which are uncommon) and safety concerns (which are rare). I would also consider risedronate (Actonel) but it usually more expensive than alendronate. I also mentioned anabolic agents. The patient decided to continue alendronate 70 mg weekl7.     Return appointment with DXA in 1 year. Total time on the date the encounter was  minutes. Yuly Shaw MD, Director, [facility]) Osteoporosis and Bone Health Services    CC: Wendy Mccall MD

## 2022-01-06 NOTE — LETTER
[facility] and [facility]osis  [facility] [address] [facility], [address] [place] [address],[place] [address]  Phone [phone]  Fax [phone]         2022         Sun Scott MD                              Re:  Yadirayanet Austin,  1950    Dear Dr. Pawel Matute: Thank you for asking me to see Guadalupe Romanvivi in consultation. As you know, Ms. Evelio Dominguez is a 73 y.o. woman whose bone density is only borderline low in the spine (-1.8) and normal in the hips but had a T10 compression fracture in  and a T12 compression fracture in 2021. Although bone density is in the \"osteopenia\" range, the correct clinical diagnosis is osteoporosis. We reviewed life-style issues (calcium, vitamin D and physical activity). She has been taking alendronate since 2021, correctly and without side effects. We discussed other treatment choices and she decided to continue with alendronate. Enclosed is a copy of my consultation note. Please let me know if you have any questions. Sincerely,    Tayo Dodson. Colin MCCLELLAN     Encl.  Copy of consult note      CC: Hema Weems MD

## 2022-01-18 DIAGNOSIS — E78.00 PURE HYPERCHOLESTEROLEMIA: ICD-10-CM

## 2022-01-18 RX ORDER — ROSUVASTATIN CALCIUM 5 MG/1
5 TABLET, COATED ORAL NIGHTLY
Qty: 30 TABLET | Refills: 0 | Status: SHIPPED | OUTPATIENT
Start: 2022-01-18 | End: 2022-02-17 | Stop reason: SDUPTHER

## 2022-01-18 NOTE — TELEPHONE ENCOUNTER
Medication:   Requested Prescriptions     Pending Prescriptions Disp Refills    rosuvastatin (CRESTOR) 5 MG tablet 90 tablet 1     Sig: Take 1 tablet by mouth nightly        Last Filled:  11/09/2021 # 90    Patient Phone Number: [phone] (home)     Last appt: 11/29/2021   Next appt: 3/2/2022    Last OARRS: No flowsheet data found.

## 2022-02-17 DIAGNOSIS — M85.89 OSTEOPENIA OF MULTIPLE SITES: ICD-10-CM

## 2022-02-17 DIAGNOSIS — E78.00 PURE HYPERCHOLESTEROLEMIA: ICD-10-CM

## 2022-02-17 DIAGNOSIS — F51.01 PRIMARY INSOMNIA: ICD-10-CM

## 2022-02-17 DIAGNOSIS — G25.81 RESTLESS LEG SYNDROME: ICD-10-CM

## 2022-02-17 DIAGNOSIS — R10.13 DYSPEPSIA: ICD-10-CM

## 2022-02-17 DIAGNOSIS — F33.41 RECURRENT MAJOR DEPRESSIVE DISORDER, IN PARTIAL REMISSION (HCC): Chronic | ICD-10-CM

## 2022-02-17 RX ORDER — ROPINIROLE 0.5 MG/1
0.5 TABLET, FILM COATED ORAL 3 TIMES DAILY
Qty: 270 TABLET | Refills: 0 | Status: SHIPPED | OUTPATIENT
Start: 2022-02-17 | End: 2022-03-14 | Stop reason: SDUPTHER

## 2022-02-17 RX ORDER — BUPROPION HYDROCHLORIDE 300 MG/1
300 TABLET ORAL EVERY MORNING
Qty: 90 TABLET | Refills: 0 | Status: SHIPPED | OUTPATIENT
Start: 2022-02-17 | End: 2022-03-14 | Stop reason: SDUPTHER

## 2022-02-17 RX ORDER — QUETIAPINE FUMARATE 25 MG/1
25 TABLET, FILM COATED ORAL DAILY
Qty: 90 TABLET | Refills: 0 | Status: SHIPPED | OUTPATIENT
Start: 2022-02-17 | End: 2022-03-14 | Stop reason: SDUPTHER

## 2022-02-17 RX ORDER — ALENDRONATE SODIUM 70 MG/1
70 TABLET ORAL
Qty: 12 TABLET | Refills: 0 | Status: SHIPPED | OUTPATIENT
Start: 2022-02-17 | End: 2022-07-17 | Stop reason: SDUPTHER

## 2022-02-17 RX ORDER — PANTOPRAZOLE SODIUM 40 MG/1
40 TABLET, DELAYED RELEASE ORAL DAILY
Qty: 90 TABLET | Refills: 0 | Status: SHIPPED | OUTPATIENT
Start: 2022-02-17 | End: 2022-03-14 | Stop reason: SDUPTHER

## 2022-02-17 RX ORDER — ROSUVASTATIN CALCIUM 5 MG/1
5 TABLET, COATED ORAL NIGHTLY
Qty: 90 TABLET | Refills: 0 | Status: SHIPPED | OUTPATIENT
Start: 2022-02-17 | End: 2022-03-14 | Stop reason: SDUPTHER

## 2022-02-17 NOTE — TELEPHONE ENCOUNTER
Medication:   Requested Prescriptions     Pending Prescriptions Disp Refills    alendronate (FOSAMAX) 70 MG tablet 12 tablet 1     Sig: Take 1 tablet by mouth every 7 days    QUEtiapine (SEROQUEL) 25 MG tablet 90 tablet 1     Sig: Take 1 tablet by mouth daily    rosuvastatin (CRESTOR) 5 MG tablet 30 tablet 0     Sig: Take 1 tablet by mouth nightly    buPROPion (WELLBUTRIN XL) 300 MG extended release tablet 90 tablet 1     Sig: Take 1 tablet by mouth every morning    rOPINIRole (REQUIP) 0.5 MG tablet 270 tablet 1     Sig: Take 1 tablet by mouth 3 times daily    pantoprazole (PROTONIX) 40 MG tablet 90 tablet 1     Sig: Take 1 tablet by mouth daily        Last Filled:  Alendronate 70 mg 09/10/21 # 12 Quetiapine 25 mg 08/23/2021 # 90 rosuvastatin 5 mg 01/18/2022 # 30 bupropion 300 mg 08/23/21 # 90 requip 08/23/21 # 270 protonix 40 mg 08/23/21 # 90    Patient Phone Number: [phone] (home)     Last appt: 11/29/2021   Next appt: 3/14/2022    Last OARRS: No flowsheet data found.

## 2022-02-21 DIAGNOSIS — G25.81 RESTLESS LEG SYNDROME: ICD-10-CM

## 2022-02-21 DIAGNOSIS — F33.41 RECURRENT MAJOR DEPRESSIVE DISORDER, IN PARTIAL REMISSION (HCC): Chronic | ICD-10-CM

## 2022-02-21 DIAGNOSIS — M85.89 OSTEOPENIA OF MULTIPLE SITES: ICD-10-CM

## 2022-02-21 DIAGNOSIS — R10.13 DYSPEPSIA: ICD-10-CM

## 2022-02-21 DIAGNOSIS — F51.01 PRIMARY INSOMNIA: ICD-10-CM

## 2022-02-21 DIAGNOSIS — E78.00 PURE HYPERCHOLESTEROLEMIA: ICD-10-CM

## 2022-02-21 RX ORDER — BUPROPION HYDROCHLORIDE 300 MG/1
300 TABLET ORAL EVERY MORNING
Qty: 90 TABLET | Refills: 0 | OUTPATIENT
Start: 2022-02-21

## 2022-02-21 RX ORDER — PANTOPRAZOLE SODIUM 40 MG/1
40 TABLET, DELAYED RELEASE ORAL DAILY
Qty: 90 TABLET | Refills: 0 | OUTPATIENT
Start: 2022-02-21

## 2022-02-21 RX ORDER — QUETIAPINE FUMARATE 25 MG/1
25 TABLET, FILM COATED ORAL DAILY
Qty: 90 TABLET | Refills: 0 | OUTPATIENT
Start: 2022-02-21

## 2022-02-21 RX ORDER — ROSUVASTATIN CALCIUM 5 MG/1
5 TABLET, COATED ORAL NIGHTLY
Qty: 90 TABLET | Refills: 0 | OUTPATIENT
Start: 2022-02-21

## 2022-02-21 RX ORDER — ALENDRONATE SODIUM 70 MG/1
70 TABLET ORAL
Qty: 12 TABLET | Refills: 0 | OUTPATIENT
Start: 2022-02-21

## 2022-02-21 RX ORDER — ROPINIROLE 0.5 MG/1
0.5 TABLET, FILM COATED ORAL 3 TIMES DAILY
Qty: 270 TABLET | Refills: 0 | OUTPATIENT
Start: 2022-02-21

## 2022-02-21 NOTE — TELEPHONE ENCOUNTER
Medication:   Requested Prescriptions     Pending Prescriptions Disp Refills    alendronate (FOSAMAX) 70 MG tablet 12 tablet 0     Sig: Take 1 tablet by mouth every 7 days    QUEtiapine (SEROQUEL) 25 MG tablet 90 tablet 0     Sig: Take 1 tablet by mouth daily    rosuvastatin (CRESTOR) 5 MG tablet 90 tablet 0     Sig: Take 1 tablet by mouth nightly    buPROPion (WELLBUTRIN XL) 300 MG extended release tablet 90 tablet 0     Sig: Take 1 tablet by mouth every morning    rOPINIRole (REQUIP) 0.5 MG tablet 270 tablet 0     Sig: Take 1 tablet by mouth 3 times daily    pantoprazole (PROTONIX) 40 MG tablet 90 tablet 0     Sig: Take 1 tablet by mouth daily        Last Filled:  [id number]    Patient Phone Number: [phone] (home)     Last appt: 11/29/2021   Next appt: 3/14/2022    Last OARRS: No flowsheet data found.       [id number]

## 2022-03-14 ENCOUNTER — OFFICE VISIT (OUTPATIENT)
Dept: PRIMARY CARE CLINIC | Age: 72
End: 2022-03-14
Payer: MEDICARE

## 2022-03-14 VITALS
HEIGHT: 66 IN | SYSTOLIC BLOOD PRESSURE: 128 MMHG | WEIGHT: 180 LBS | BODY MASS INDEX: 28.93 KG/M2 | RESPIRATION RATE: 16 BRPM | DIASTOLIC BLOOD PRESSURE: 86 MMHG | TEMPERATURE: 97.4 F

## 2022-03-14 DIAGNOSIS — R10.13 DYSPEPSIA: ICD-10-CM

## 2022-03-14 DIAGNOSIS — Z00.00 MEDICARE ANNUAL WELLNESS VISIT, SUBSEQUENT: Primary | ICD-10-CM

## 2022-03-14 DIAGNOSIS — S22.070G CLOSED WEDGE COMPRESSION FRACTURE OF T10 VERTEBRA WITH DELAYED HEALING, SUBSEQUENT ENCOUNTER: ICD-10-CM

## 2022-03-14 DIAGNOSIS — I70.0 AORTIC CALCIFICATION (HCC): ICD-10-CM

## 2022-03-14 DIAGNOSIS — R73.01 IMPAIRED FASTING BLOOD SUGAR: Chronic | ICD-10-CM

## 2022-03-14 DIAGNOSIS — F51.01 PRIMARY INSOMNIA: Chronic | ICD-10-CM

## 2022-03-14 DIAGNOSIS — E78.00 PURE HYPERCHOLESTEROLEMIA: Chronic | ICD-10-CM

## 2022-03-14 DIAGNOSIS — F33.41 RECURRENT MAJOR DEPRESSIVE DISORDER, IN PARTIAL REMISSION (HCC): ICD-10-CM

## 2022-03-14 DIAGNOSIS — G25.81 RESTLESS LEG SYNDROME: Chronic | ICD-10-CM

## 2022-03-14 DIAGNOSIS — M81.0 OSTEOPOROSIS, POSTMENOPAUSAL: ICD-10-CM

## 2022-03-14 DIAGNOSIS — R19.4 BOWEL HABIT CHANGES: ICD-10-CM

## 2022-03-14 DIAGNOSIS — G43.109 MIGRAINE WITH AURA AND WITHOUT STATUS MIGRAINOSUS, NOT INTRACTABLE: ICD-10-CM

## 2022-03-14 LAB
A/G RATIO: 1.9 (ref 1.1–2.2)
ALBUMIN SERPL-MCNC: 4.7 G/DL (ref 3.4–5)
ALP BLD-CCNC: 63 U/L (ref 40–129)
ALT SERPL-CCNC: 20 U/L (ref 10–40)
ANION GAP SERPL CALCULATED.3IONS-SCNC: 15 MMOL/L (ref 3–16)
AST SERPL-CCNC: 24 U/L (ref 15–37)
BILIRUB SERPL-MCNC: 0.4 MG/DL (ref 0–1)
BUN BLDV-MCNC: 19 MG/DL (ref 7–20)
CALCIUM SERPL-MCNC: 10.2 MG/DL (ref 8.3–10.6)
CHLORIDE BLD-SCNC: 96 MMOL/L (ref 99–110)
CHOLESTEROL, TOTAL: 172 MG/DL (ref 0–199)
CO2: 25 MMOL/L (ref 21–32)
CREAT SERPL-MCNC: 0.6 MG/DL (ref 0.6–1.2)
FOLATE: >20 NG/ML (ref 4.78–24.2)
GFR AFRICAN AMERICAN: >60
GFR NON-AFRICAN AMERICAN: >60
GLUCOSE BLD-MCNC: 99 MG/DL (ref 70–99)
HDLC SERPL-MCNC: 92 MG/DL (ref 40–60)
LDL CHOLESTEROL CALCULATED: 71 MG/DL
MAGNESIUM: 2.4 MG/DL (ref 1.8–2.4)
POTASSIUM SERPL-SCNC: 5.3 MMOL/L (ref 3.5–5.1)
SODIUM BLD-SCNC: 136 MMOL/L (ref 136–145)
TOTAL PROTEIN: 7.2 G/DL (ref 6.4–8.2)
TRIGL SERPL-MCNC: 46 MG/DL (ref 0–150)
VITAMIN B-12: 1454 PG/ML (ref 211–911)
VLDLC SERPL CALC-MCNC: 9 MG/DL

## 2022-03-14 PROCEDURE — 4040F PNEUMOC VAC/ADMIN/RCVD: CPT | Performed by: FAMILY MEDICINE

## 2022-03-14 PROCEDURE — G8400 PT W/DXA NO RESULTS DOC: HCPCS | Performed by: FAMILY MEDICINE

## 2022-03-14 PROCEDURE — 36415 COLL VENOUS BLD VENIPUNCTURE: CPT | Performed by: FAMILY MEDICINE

## 2022-03-14 PROCEDURE — G8417 CALC BMI ABV UP PARAM F/U: HCPCS | Performed by: FAMILY MEDICINE

## 2022-03-14 PROCEDURE — G0439 PPPS, SUBSEQ VISIT: HCPCS | Performed by: FAMILY MEDICINE

## 2022-03-14 PROCEDURE — 1036F TOBACCO NON-USER: CPT | Performed by: FAMILY MEDICINE

## 2022-03-14 PROCEDURE — 1090F PRES/ABSN URINE INCON ASSESS: CPT | Performed by: FAMILY MEDICINE

## 2022-03-14 PROCEDURE — 1123F ACP DISCUSS/DSCN MKR DOCD: CPT | Performed by: FAMILY MEDICINE

## 2022-03-14 PROCEDURE — G8484 FLU IMMUNIZE NO ADMIN: HCPCS | Performed by: FAMILY MEDICINE

## 2022-03-14 PROCEDURE — G8427 DOCREV CUR MEDS BY ELIG CLIN: HCPCS | Performed by: FAMILY MEDICINE

## 2022-03-14 PROCEDURE — 99214 OFFICE O/P EST MOD 30 MIN: CPT | Performed by: FAMILY MEDICINE

## 2022-03-14 PROCEDURE — 3017F COLORECTAL CA SCREEN DOC REV: CPT | Performed by: FAMILY MEDICINE

## 2022-03-14 RX ORDER — BUPROPION HYDROCHLORIDE 300 MG/1
300 TABLET ORAL EVERY MORNING
Qty: 90 TABLET | Refills: 1 | Status: SHIPPED | OUTPATIENT
Start: 2022-03-14 | End: 2022-09-06 | Stop reason: SDUPTHER

## 2022-03-14 RX ORDER — ROPINIROLE 0.5 MG/1
0.5 TABLET, FILM COATED ORAL 3 TIMES DAILY
Qty: 270 TABLET | Refills: 1 | Status: SHIPPED | OUTPATIENT
Start: 2022-03-14 | End: 2022-09-06

## 2022-03-14 RX ORDER — PANTOPRAZOLE SODIUM 40 MG/1
40 TABLET, DELAYED RELEASE ORAL DAILY
Qty: 90 TABLET | Refills: 1 | Status: SHIPPED | OUTPATIENT
Start: 2022-03-14 | End: 2022-09-06 | Stop reason: SDUPTHER

## 2022-03-14 RX ORDER — ROSUVASTATIN CALCIUM 5 MG/1
5 TABLET, COATED ORAL NIGHTLY
Qty: 90 TABLET | Refills: 1 | Status: SHIPPED | OUTPATIENT
Start: 2022-03-14 | End: 2022-09-06 | Stop reason: SDUPTHER

## 2022-03-14 RX ORDER — QUETIAPINE FUMARATE 25 MG/1
25 TABLET, FILM COATED ORAL DAILY
Qty: 90 TABLET | Refills: 1 | Status: SHIPPED | OUTPATIENT
Start: 2022-03-14 | End: 2022-09-06 | Stop reason: SDUPTHER

## 2022-03-14 ASSESSMENT — PATIENT HEALTH QUESTIONNAIRE - PHQ9
SUM OF ALL RESPONSES TO PHQ QUESTIONS 1-9: 3
5. POOR APPETITE OR OVEREATING: 0
7. TROUBLE CONCENTRATING ON THINGS, SUCH AS READING THE NEWSPAPER OR WATCHING TELEVISION: 0
6. FEELING BAD ABOUT YOURSELF - OR THAT YOU ARE A FAILURE OR HAVE LET YOURSELF OR YOUR FAMILY DOWN: 0
1. LITTLE INTEREST OR PLEASURE IN DOING THINGS: 0
4. FEELING TIRED OR HAVING LITTLE ENERGY: 1
SUM OF ALL RESPONSES TO PHQ QUESTIONS 1-9: 3
3. TROUBLE FALLING OR STAYING ASLEEP: 1
SUM OF ALL RESPONSES TO PHQ QUESTIONS 1-9: 3
9. THOUGHTS THAT YOU WOULD BE BETTER OFF DEAD, OR OF HURTING YOURSELF: 0
2. FEELING DOWN, DEPRESSED OR HOPELESS: 1
8. MOVING OR SPEAKING SO SLOWLY THAT OTHER PEOPLE COULD HAVE NOTICED. OR THE OPPOSITE, BEING SO FIGETY OR RESTLESS THAT YOU HAVE BEEN MOVING AROUND A LOT MORE THAN USUAL: 0
10. IF YOU CHECKED OFF ANY PROBLEMS, HOW DIFFICULT HAVE THESE PROBLEMS MADE IT FOR YOU TO DO YOUR WORK, TAKE CARE OF THINGS AT HOME, OR GET ALONG WITH OTHER PEOPLE: 0
SUM OF ALL RESPONSES TO PHQ QUESTIONS 1-9: 3
SUM OF ALL RESPONSES TO PHQ9 QUESTIONS 1 & 2: 1

## 2022-03-14 NOTE — PATIENT INSTRUCTIONS
Personalized Preventive Plan for Alejo Corea - 3/14/2022  Medicare offers a range of preventive health benefits. Some of the tests and screenings are paid in full while other may be subject to a deductible, co-insurance, and/or copay. Some of these benefits include a comprehensive review of your medical history including lifestyle, illnesses that may run in your family, and various assessments and screenings as appropriate. After reviewing your medical record and screening and assessments performed today your provider may have ordered immunizations, labs, imaging, and/or referrals for you. A list of these orders (if applicable) as well as your Preventive Care list are included within your After Visit Summary for your review. Other Preventive Recommendations:    · A preventive eye exam performed by an eye specialist is recommended every 1-2 years to screen for glaucoma; cataracts, macular degeneration, and other eye disorders. · A preventive dental visit is recommended every 6 months. · Try to get at least 150 minutes of exercise per week or 10,000 steps per day on a pedometer . · Order or download the FREE \"Exercise & Physical Activity: Your Everyday Guide\" from [employer]. Call 9-448.920.2264 or search [employer] online. · You need 7862-0624 mg of calcium and 7477-2808 IU of vitamin D per day. It is possible to meet your calcium requirement with diet alone, but a vitamin D supplement is usually necessary to meet this goal.  · When exposed to the sun, use a sunscreen that protects against both UVA and UVB radiation with an SPF of 30 or greater. Reapply every 2 to 3 hours or after sweating, drying off with a towel, or swimming. · Always wear a seat belt when traveling in a car. Always wear a helmet when riding a bicycle or motorcycle.

## 2022-03-14 NOTE — PROGRESS NOTES
Medicare Annual Wellness Visit  Name: Sam Alves Date: 3/14/2022   MRN: [id number] Sex: Female   Age: 81 y.o. Ethnicity: Non- / Non    : 1950 Race: White (non-)      Jacklyn Almaraz is here for Medicare AWV     IFG- denies increased thirst and urination. Following low carb diet. Continues weight loss efforts. Elevated blood pressure-had a history of increased readings however, her blood pressure decreased with weight loss. Hyperlipidemia- on rosuvastatin    Restless legs- she had difficulty with restless legs while at a movie and has noted increased issues at night on occasion. No side effects from med    insomnia- on seroquel-- reports occasional continued difficulty falling asleep but this is manageable    Depression-no difficulty with her medication. She has had decreased mood with the pain she is experiencing as well as the changes of bowel movements. She does not feel medication changes warranted at this time    Dyspepsia- on PPI. History gastric ulcer with NSAID use, continues need for nsaid with arthritis . rafael any abdominal pain, nausea, gerd    Migraines-  Headaches several times a month will use maxalt with good results. Does need 2 tablets typically for complete relief has had an increase in frequency of headaches however continues to respond to the Maxalt  Only 1-2 times a month    Osteoporosis- -Dexa-. She has had osteopenia for over 10 years with minimal further decrease in bone density. No previous medication use- initially was on HRT but this was stopped when she had PE. Aortic calcification seen on echocardiogram in 2016. History mild mitral regurgitation. History-mild tricuspid regurgitation     Arthritis-patient has known arthritis at her shoulder as well as knees which she has had replaced.   She does continue to have pain throughout    Was compression fracture of T10 vertebra-she has had an ablation procedure which did help improve pain. She continues gabapentin but is trying to avoid narcotic pain medication. She follows with pain management on a regular basis    Bowel movement changes-she reports episodes of difficulty completing bowel movement she will have a small passage of stool and no other movement but then when she rises from the commode she will have a sudden urge to defecate again. This will repeat several times throughout the day. She reports no liquid stools and no pellet-like stools. She is no longer taking a fiber supplement. This has been very troubling and difficult to complete her usual activities and appointments as she must always be close to a bathroom. Screenings for behavioral, psychosocial and functional/safety risks, and cognitive dysfunction are all negative except as indicated below. These results, as well as other patient data from the [email or web address] form, are documented in Flowsheets linked to this Encounter. Allergies   Allergen Reactions    Ciprofloxacin Itching and Rash     9/29/20: red,itchy rash.  Sulfamethoxazole-Trimethoprim Itching and Rash     9/29/20: red, itchy rash     pi  Prior to Visit Medications    Medication Sig Taking?  Authorizing Provider   buPROPion (WELLBUTRIN XL) 300 MG extended release tablet Take 1 tablet by mouth every morning Yes Cathy Valerio MD   rosuvastatin (CRESTOR) 5 MG tablet Take 1 tablet by mouth nightly Yes Cathy Valerio MD   rOPINIRole (REQUIP) 0.5 MG tablet Take 1 tablet by mouth 3 times daily Yes Cathy Valerio MD   QUEtiapine (SEROQUEL) 25 MG tablet Take 1 tablet by mouth daily Yes Cathy Valerio MD   pantoprazole (PROTONIX) 40 MG tablet Take 1 tablet by mouth daily Yes Cathy Valerio MD   alendronate (FOSAMAX) 70 MG tablet Take 1 tablet by mouth every 7 days Yes Cathy Valerio MD   gabapentin (NEURONTIN) 400 MG capsule  Yes Historical Provider, MD   DICLOFENAC PO Take 75 mg by mouth 2 times daily Yes Historical Provider, MD   gabapentin (NEURONTIN) 300 MG capsule  Yes Historical Provider, MD   acetaminophen (TYLENOL) 500 MG tablet Take 1,000 mg by mouth Yes Historical Provider, MD   Probiotic Product (PROBIOTIC DAILY PO) Take by mouth daily Yes Historical Provider, MD   Multiple Vitamin (MULTIVITAMIN ADULT PO) Take by mouth daily Yes Historical Provider, MD   MAGNESIUM PO Take 450 mg by mouth Daily with supper  Yes Historical Provider, MD   B Complex Vitamins (VITAMIN B COMPLEX) TABS Take 1 tablet by mouth every morning  Yes Historical Provider, MD   rizatriptan (MAXALT) 10 MG tablet  Yes Historical Provider, MD   ne (SEROQUEL) 25 MG tablet Take 1 tablet by mouth daily Yes Jong Fernandez MD   rosuvastatin (CRESTOR) 5 MG tablet Take 1 tablet by mouth nightly Yes Jong Fernandez MD   buPROPion (WELLBUTRIN XL) 300 MG extended release tablet Take 1 tablet by mouth every morning Yes Jong Fernandez MD   rOPINIRole (REQUIP) 0.5 MG tablet Take 1 tablet by mouth 3 times daily Yes Jong Fernandez MD   pantoprazole (PROTONIX) 40 MG tablet Take 1 tablet by mouth daily Yes Jong Fernandez MD   gabapentin (NEURONTIN) 400 MG capsule  Yes Historical Provider, MD   DICLOFENAC PO Take 75 mg by mouth 2 times daily Yes Historical Provider, MD   cefUROXime (CEFTIN) 250 MG tablet  Yes Historical Provider, MD   gabapentin (NEURONTIN) 300 MG capsule  Yes Historical Provider, MD   acetaminophen (TYLENOL) 500 MG tablet Take 1,000 mg by mouth Yes Historical Provider, MD   Probiotic Product (PROBIOTIC DAILY PO) Take by mouth daily Yes Historical Provider, MD   Multiple Vitamin (MULTIVITAMIN ADULT PO) Take by mouth daily Yes Historical Provider, MD   MAGNESIUM PO Take 450 mg by mouth Daily with supper  Yes Historical Provider, MD   B Complex Vitamins (VITAMIN B COMPLEX) TABS Take 1 tablet by mouth every morning  Yes Historical Provider, MD   rizatriptan (MAXALT) 10 MG tablet  Yes Historical Provider, MD      Diagnosis Date    Acute gastric ulcer without hemorrhage or perforation secondary to NSAID use    Aortic calcification (HCC)     Compression fracture of T10 vertebra ([address]     fall    Fracture of left ankle     fall    Fracture of T12 vertebra ([address]     fall    Nonrheumatic mitral valve regurgitation     Nonrheumatic tricuspid valve regurgitation     Other pulmonary embolism without acute cor pulmonale ([address] 2012    Personal history of venous thrombosis and embolism 10/29/2019    after knee replacement    Primary insomnia 2021    Pure hypercholesterolemia 2021    Recurrent major depressive disorder, in partial remission ([address] 2021    Restless leg syndrome 2021     Past Surgical History:   Procedure Laterality Date     SECTION      CHOLECYSTECTOMY, LAPAROSCOPIC N/A     FACIAL COSMETIC SURGERY      FINGER FRACTURE SURGERY  2012    JOINT REPLACEMENT Right 2018    JOINT REPLACEMENT Left 2020    KNEE ARTHROSCOPY Right 2019    right    KNEE SURGERY  10/2019    open synovectomy    SHOULDER ARTHROPLASTY Right 2017    SHOULDER ARTHROPLASTY Left        Family History   Problem Relation Age of Onset    Breast Cancer Mother     Lung Cancer Mother     Osteoporosis Mother     Stroke Father     Kidney Disease Sister        CareTeam (Including outside providers/suppliers regularly involved in providing care):   Patient Care Team:  Lisette Zimmer MD as PCP - General (Family Medicine)  Lisette Zimmer MD as PCP - REHABILITATION [facility] Empaneled Provider    Wt Readings from Last 3 Encounters:   22 190 lb (86.2 kg)   22 178 lb 6.4 oz (80.9 kg)   21 177 lb (80.3 kg)     Vitals:    22 1025   BP: 128/86   Site: Right Upper Arm   Position: Sitting   Cuff Size: Medium Adult   Resp: 16   Temp: 97.4 °F (36.3 °C)   TempSrc: Temporal   Weight: 190 lb (86.2 kg)   Height: 5' 6.3\" (1.684 m)     Body mass index is 30.39 kg/m².     Based upon direct observation of the patient, evaluation of cognition reveals recent and remote memory intact. Review of Systems   All other systems reviewed and are negative. Physical Exam  Constitutional:       Appearance: Normal appearance. HENT:      Head: Normocephalic and atraumatic. Right Ear: Tympanic membrane, ear canal and external ear normal.      Left Ear: Tympanic membrane, ear canal and external ear normal.   Eyes:      General: No scleral icterus. Extraocular Movements: Extraocular movements intact. Conjunctiva/sclera: Conjunctivae normal.      Pupils: Pupils are equal, round, and reactive to light. Neck:      Thyroid: No thyroid mass, thyromegaly or thyroid tenderness. Cardiovascular:      Rate and Rhythm: Normal rate and regular rhythm. Heart sounds: Normal heart sounds. No murmur heard. Pulmonary:      Effort: Pulmonary effort is normal.      Breath sounds: Normal breath sounds. No wheezing. Abdominal:      Palpations: Abdomen is soft. Tenderness: There is no abdominal tenderness. Musculoskeletal:      Right lower leg: No edema. Left lower leg: No edema. Lymphadenopathy:      Cervical: No cervical adenopathy. Skin:     Findings: No rash. Neurological:      General: No focal deficit present. Mental Status: She is alert and oriented to person, place, and time. Psychiatric:         Mood and Affect: Mood normal.         Behavior: Behavior normal.         Thought Content: Thought content normal.         Judgment: Judgment normal.          Patient's complete Health Risk Assessment and screening values have been reviewed and are found in Flowsheets. The following problems were reviewed today and where indicated follow up appointments were made and/or referrals ordered.     Positive Risk Factor Screenings with Interventions:           Health Habits/Nutrition:     Physical Activity: Insufficiently Active    Days of Exercise per Week: 3 days    Minutes of Exercise per Session: 30 min     Have you lost any weight without trying in the past 3 months?: No  Body mass index: (!) 30.39  Have you seen the dentist within the past year?: Yes    Health Habits/Nutrition Interventions:  · With current pain and bowel movement issues difficult to increase outdoor exercise which she does perform         Personalized Preventive Plan   Current Health Maintenance Status  Immunization History   Administered Date(s) Administered    COVID-19, Pfizer Purple top, DILUTE for use, 12+ yrs, 30mcg/0.3mL dose 02/08/2021, 03/08/2021, 09/28/2021    Influenza Virus Vaccine 09/24/2020    Influenza, High-dose, Quadv, 65 yrs +, IM (Fluzone) 09/03/2021    Influenza, Quadv, adjuvanted, 65 yrs +, IM, PF (Fluad) 09/24/2020    Pneumococcal Conjugate 13-valent ([id number]) 09/22/2017    Pneumococcal Polysaccharide ([id number]) 08/01/2016    Tdap (Boostrix, Adacel) 12/01/2013    Zoster Live (Zostavax) 12/01/2013    Zoster Recombinant (Shingrix) 08/29/2020, 11/10/2020      Health Maintenance   Topic Date Due    Annual Wellness Visit (AWV)  03/04/2022    Pneumococcal 65+ years Vaccine (2 of 2 - PPSV23) 08/01/2024 (Originally 8/1/2021)    A1C test (Diabetic or Prediabetic)  08/11/2022    Lipid screen  11/04/2022    Colorectal Cancer Screen  01/10/2023    Depression Monitoring  01/18/2023    Breast cancer screen  02/22/2023    DTaP/Tdap/Td vaccine (2 - Td or Tdap) 12/01/2023    DEXA (modify frequency per FRAX score)  Completed    Flu vaccine  Completed    Shingles Vaccine  Completed    COVID-19 Vaccine  Completed    Hepatitis C screen  Completed    Hepatitis A vaccine  Aged Out    Hepatitis B vaccine  Aged Out    Hib vaccine  Aged Out    Meningococcal (ACWY) vaccine  Aged Out     Recommendations for [facility] Due: see orders and patient instructions/AVS.    1. Medicare annual wellness visit, subsequent  Discussed we may update her Prevnar once Prevnar 20 is available    2.  Pure hypercholesterolemia  Assess labs and adjust dosing as needed  - Comprehensive Metabolic Panel  - Lipid Panel  - rosuvastatin (CRESTOR) 5 MG tablet; Take 1 tablet by mouth nightly  Dispense: 90 tablet; Refill: 1    3. Impaired fasting blood sugar  Continue nutrition changes and exercise as able  - Hemoglobin A1C    4. Dyspepsia  To continue the Protonix especially with occasional use of NSAIDs-do recommend avoid if possible due to history of gastric ulcer  - Magnesium  - Vitamin B12 & Folate  - pantoprazole (PROTONIX) 40 MG tablet; Take 1 tablet by mouth daily  Dispense: 90 tablet; Refill: 1    5. Restless leg syndrome  Stable on current medications  - rOPINIRole (REQUIP) 0.5 MG tablet; Take 1 tablet by mouth 3 times daily  Dispense: 270 tablet; Refill: 1  - QUEtiapine (SEROQUEL) 25 MG tablet; Take 1 tablet by mouth daily  Dispense: 90 tablet; Refill: 1    6. Primary insomnia  Stable on current medication  - QUEtiapine (SEROQUEL) 25 MG tablet; Take 1 tablet by mouth daily  Dispense: 90 tablet; Refill: 1    7. Migraine with aura and without status migrainosus, not intractable  Continue Maxalt as needed and supportive measures    8. Recurrent major depressive disorder, in partial remission ([facility]  Discussed the role of physical changes and difficulties affecting depression. She feels that her medication is okay at this time but discussed with patient should she notice decrease in mood to follow-up sooner  - buPROPion (WELLBUTRIN XL) 300 MG extended release tablet; Take 1 tablet by mouth every morning  Dispense: 90 tablet; Refill: 1    9. Aortic calcification (HCC)  No symptoms at this time    10. Osteoporosis, postmenopausal  Continues Fosamax without difficulty. Follows with endocrinologist annually    11. Closed wedge compression fracture of T10 vertebra with delayed healing, subsequent encounter  Continue to follow-up with Ortho as well as pain management    12.  Bowel habit changes  Will discuss with ALEN-Rosa and determine next steps       Return in 6 months (on 9/14/2022) for Medicare Annual Wellness Visit in 1 year.     Recommended screening schedule for the next 5-10 years is provided to the patient in written form: see Patient Instructions/AVS.    Electronically signed by Rene Mahajan MD on 3/14/22 at 11:15 AM.

## 2022-03-15 ENCOUNTER — TELEPHONE (OUTPATIENT)
Dept: PRIMARY CARE CLINIC | Age: 72
End: 2022-03-15

## 2022-03-15 LAB
ESTIMATED AVERAGE GLUCOSE: 102.5 MG/DL
HBA1C MFR BLD: 5.2 %

## 2022-03-17 PROBLEM — S22.070A CLOSED WEDGE COMPRESSION FRACTURE OF T10 VERTEBRA (HCC): Status: RESOLVED | Noted: 2021-12-01 | Resolved: 2022-03-17

## 2022-03-17 PROBLEM — S22.089A FRACTURE OF T12 VERTEBRA (HCC): Status: ACTIVE | Noted: 2021-01-01

## 2022-04-04 RX ORDER — RIZATRIPTAN BENZOATE 10 MG/1
10 TABLET ORAL
Qty: 30 TABLET | Refills: 1 | Status: SHIPPED | OUTPATIENT
Start: 2022-04-04 | End: 2022-04-04

## 2022-04-04 NOTE — TELEPHONE ENCOUNTER
Medication:   Requested Prescriptions     Pending Prescriptions Disp Refills    rizatriptan (MAXALT) 10 MG tablet 30 tablet         Last Filled:      Patient Phone Number: [phone] (home)     Last appt: 3/14/2022   Next appt: Visit date not found    Last OARRS: No flowsheet data found.

## 2022-07-10 NOTE — PROGRESS NOTES
Preoperative Consultation    Date of Service: 2022   Patient: Wade Beckman  YOB: 1950     This patient presents to the office today for a preoperative consultation at the request of surgeon, Citlalli Aguirre, who plans on performing right wrist  on . Planned anesthesia: Regional   Known anesthesia problems: nausea with general anesthesia  Bleeding risk: No recent or remote history of abnormal bleeding  Personal or FH ofDVT/PE: Yes -DVT leading to PE  Personal History of Snoring and/or Sleep Apnea: no    Patient Active Problem List   Diagnosis    Impaired fasting blood sugar    Aortic calcification (HCC)    Restless leg syndrome    Primary insomnia    Recurrent major depressive disorder, in partial remission ([facility]    Nonrheumatic mitral valve regurgitation    Nonrheumatic tricuspid valve regurgitation    Dyspepsia    Pure hypercholesterolemia    Raynaud's disease    Primary osteoarthritis of right shoulder    Migraine with aura and without status migrainosus, not intractable    Primary osteoarthritis of both hands    Osteoporosis, postmenopausal     Past Surgical History:   Procedure Laterality Date     SECTION      CHOLECYSTECTOMY, LAPAROSCOPIC N/A     FACIAL COSMETIC SURGERY  2014    FINGER FRACTURE SURGERY  2012    JOINT REPLACEMENT Right 2018    JOINT REPLACEMENT Left 2020    KNEE ARTHROSCOPY Right 2019    right    KNEE SURGERY  10/2019    open synovectomy    SHOULDER ARTHROPLASTY Right 2017    SHOULDER ARTHROPLASTY Left 2018     Allergies   Allergen Reactions    Ciprofloxacin Itching and Rash     20: red,itchy rash.       Sulfamethoxazole-Trimethoprim Itching and Rash     20: red, itchy rash       Current Outpatient Medications   Medication Sig Dispense Refill    buPROPion (WELLBUTRIN XL) 300 MG extended release tablet Take 1 tablet by mouth every morning 90 tablet 1    rosuvastatin (CRESTOR) 5 MG tablet Take 1 tablet 03/14/2022 11:04 AM    PROT 7.2 03/14/2022 11:04 AM    LABALBU 4.7 03/14/2022 11:04 AM    AGRATIO 1.9 03/14/2022 11:04 AM    BILITOT 0.4 03/14/2022 11:04 AM    ALKPHOS 63 03/14/2022 11:04 AM    ALT 20 03/14/2022 11:04 AM    AST 24 03/14/2022 11:04 AM    GLOB 2.8 08/11/2021 09:52 AM     HBA1C:  Lab Results   Component Value Date/Time    LABA1C 5.2 03/14/2022 11:04 AM    .5 03/14/2022 11:04 AM       OBJECTIVE:   /84   Temp 97.7 °F (36.5 °C) (Temporal)   Resp 16   Ht 5' 6.3\" (1.684 m)   Wt 184 lb 3.2 oz (83.6 kg)   LMP  (LMP Unknown)   BMI 29.46 kg/m²  Weight: 184 lb 3.2 oz (83.6 kg)   Physical Exam  Vitals reviewed. Constitutional:       Appearance: Normal appearance. HENT:      Head: Normocephalic and atraumatic. Right Ear: Tympanic membrane, ear canal and external ear normal.      Left Ear: Tympanic membrane, ear canal and external ear normal.      Nose: Nose normal.      Mouth/Throat:      Mouth: Mucous membranes are moist.      Pharynx: Oropharynx is clear. No oropharyngeal exudate or posterior oropharyngeal erythema. Eyes:      General: No scleral icterus. Extraocular Movements: Extraocular movements intact. Conjunctiva/sclera: Conjunctivae normal.      Pupils: Pupils are equal, round, and reactive to light. Neck:      Thyroid: No thyroid mass, thyromegaly or thyroid tenderness. Cardiovascular:      Rate and Rhythm: Normal rate and regular rhythm. Pulses: Normal pulses. Heart sounds: Normal heart sounds. Pulmonary:      Effort: Pulmonary effort is normal.      Breath sounds: Normal breath sounds. No wheezing, rhonchi or rales. Abdominal:      General: Bowel sounds are normal.      Palpations: Abdomen is soft. Tenderness: There is no abdominal tenderness. There is no guarding or rebound. Musculoskeletal:      Cervical back: Neck supple. No muscular tenderness. Right lower leg: No edema. Left lower leg: No edema.    Lymphadenopathy: Cervical: No cervical adenopathy. Skin:     General: Skin is warm and dry. Findings: No rash. Neurological:      General: No focal deficit present. Mental Status: She is alert and oriented to person, place, and time. Cranial Nerves: No cranial nerve deficit. Sensory: No sensory deficit. Motor: No weakness. Coordination: Coordination normal.      Gait: Gait normal.   Psychiatric:         Mood and Affect: Mood normal.         Behavior: Behavior normal.         Thought Content: Thought content normal.         Judgment: Judgment normal.         EKG Interpretation:  normal EKG, normal sinus rhythm, unchanged from previous tracings. ASSESSMENT:  Jewels Nelson was seen today for pre-op exam and medication refill. Diagnoses and all orders for this visit:    Primary osteoarthritis of right wrist    Preop examination  -     EKG 12 Lead        81 y.o. patient approved for Surgery      PLAN:     1. Preoperative workup as follows: none  2. Change in medication regimen before surgery:None  3.  No contraindications to planned surgery    Electronically signed by Luis Gutierrez MD on 7/10/22 at 2:41 PM.

## 2022-07-11 ENCOUNTER — OFFICE VISIT (OUTPATIENT)
Dept: PRIMARY CARE CLINIC | Age: 72
End: 2022-07-11
Payer: MEDICARE

## 2022-07-11 VITALS
WEIGHT: 184.2 LBS | DIASTOLIC BLOOD PRESSURE: 84 MMHG | TEMPERATURE: 97.7 F | SYSTOLIC BLOOD PRESSURE: 138 MMHG | BODY MASS INDEX: 29.6 KG/M2 | RESPIRATION RATE: 16 BRPM | HEIGHT: 66 IN

## 2022-07-11 DIAGNOSIS — M19.031 PRIMARY OSTEOARTHRITIS OF RIGHT WRIST: Primary | ICD-10-CM

## 2022-07-11 DIAGNOSIS — Z01.818 PREOP EXAMINATION: ICD-10-CM

## 2022-07-11 PROBLEM — S22.089A FRACTURE OF T12 VERTEBRA (HCC): Status: RESOLVED | Noted: 2021-01-01 | Resolved: 2022-07-11

## 2022-07-11 PROCEDURE — G8427 DOCREV CUR MEDS BY ELIG CLIN: HCPCS | Performed by: FAMILY MEDICINE

## 2022-07-11 PROCEDURE — G8417 CALC BMI ABV UP PARAM F/U: HCPCS | Performed by: FAMILY MEDICINE

## 2022-07-11 PROCEDURE — G9899 SCRN MAM PERF RSLTS DOC: HCPCS | Performed by: FAMILY MEDICINE

## 2022-07-11 PROCEDURE — 1036F TOBACCO NON-USER: CPT | Performed by: FAMILY MEDICINE

## 2022-07-11 PROCEDURE — G8400 PT W/DXA NO RESULTS DOC: HCPCS | Performed by: FAMILY MEDICINE

## 2022-07-11 PROCEDURE — 93000 ELECTROCARDIOGRAM COMPLETE: CPT | Performed by: FAMILY MEDICINE

## 2022-07-11 PROCEDURE — 99214 OFFICE O/P EST MOD 30 MIN: CPT | Performed by: FAMILY MEDICINE

## 2022-07-11 PROCEDURE — 1090F PRES/ABSN URINE INCON ASSESS: CPT | Performed by: FAMILY MEDICINE

## 2022-07-11 PROCEDURE — 1123F ACP DISCUSS/DSCN MKR DOCD: CPT | Performed by: FAMILY MEDICINE

## 2022-07-11 PROCEDURE — 3017F COLORECTAL CA SCREEN DOC REV: CPT | Performed by: FAMILY MEDICINE

## 2022-07-11 ASSESSMENT — PATIENT HEALTH QUESTIONNAIRE - PHQ9
9. THOUGHTS THAT YOU WOULD BE BETTER OFF DEAD, OR OF HURTING YOURSELF: 0
7. TROUBLE CONCENTRATING ON THINGS, SUCH AS READING THE NEWSPAPER OR WATCHING TELEVISION: 0
5. POOR APPETITE OR OVEREATING: 0
10. IF YOU CHECKED OFF ANY PROBLEMS, HOW DIFFICULT HAVE THESE PROBLEMS MADE IT FOR YOU TO DO YOUR WORK, TAKE CARE OF THINGS AT HOME, OR GET ALONG WITH OTHER PEOPLE: 0
4. FEELING TIRED OR HAVING LITTLE ENERGY: 0
SUM OF ALL RESPONSES TO PHQ9 QUESTIONS 1 & 2: 0
6. FEELING BAD ABOUT YOURSELF - OR THAT YOU ARE A FAILURE OR HAVE LET YOURSELF OR YOUR FAMILY DOWN: 0
SUM OF ALL RESPONSES TO PHQ QUESTIONS 1-9: 0
2. FEELING DOWN, DEPRESSED OR HOPELESS: 0
SUM OF ALL RESPONSES TO PHQ QUESTIONS 1-9: 0
8. MOVING OR SPEAKING SO SLOWLY THAT OTHER PEOPLE COULD HAVE NOTICED. OR THE OPPOSITE, BEING SO FIGETY OR RESTLESS THAT YOU HAVE BEEN MOVING AROUND A LOT MORE THAN USUAL: 0
SUM OF ALL RESPONSES TO PHQ QUESTIONS 1-9: 0
3. TROUBLE FALLING OR STAYING ASLEEP: 0
SUM OF ALL RESPONSES TO PHQ QUESTIONS 1-9: 0
1. LITTLE INTEREST OR PLEASURE IN DOING THINGS: 0

## 2022-07-13 ENCOUNTER — TELEPHONE (OUTPATIENT)
Dept: PRIMARY CARE CLINIC | Age: 72
End: 2022-07-13

## 2022-07-17 DIAGNOSIS — M85.89 OSTEOPENIA OF MULTIPLE SITES: ICD-10-CM

## 2022-07-18 RX ORDER — ALENDRONATE SODIUM 70 MG/1
70 TABLET ORAL
Qty: 12 TABLET | Refills: 1 | Status: SHIPPED | OUTPATIENT
Start: 2022-07-18 | End: 2022-09-06 | Stop reason: SDUPTHER

## 2022-07-18 NOTE — TELEPHONE ENCOUNTER
Medication:   Requested Prescriptions     Pending Prescriptions Disp Refills    alendronate (FOSAMAX) 70 MG tablet 12 tablet 0     Sig: Take 1 tablet by mouth every 7 days        Last Filled:  2/17/2022    Patient Phone Number: [phone] (home)     Last appt: 7/11/2022   Next appt: Visit date not found, Return in about 2 months (around 9/11/2022) for Regular 6-month follow-upappointment. Last OARRS: No flowsheet data found.

## 2022-07-22 ENCOUNTER — NURSE TRIAGE (OUTPATIENT)
Dept: OTHER | Facility: CLINIC | Age: 72
End: 2022-07-22

## 2022-07-22 NOTE — TELEPHONE ENCOUNTER
Received call from Cecilia Tesfaye at [facility] with [employer]. Subjective: Caller states \"Cough\"     Current Symptoms: Dry cough, a lot of drainage when laying down. Feeling lightheaded intermittently. Onset: 2 days ago; gradual    Associated Symptoms: NA    Pain Severity: 0/10; N/A; none    Temperature: denies fever    What has been tried: None    LMP: NA Pregnant: NA    Recommended disposition: See in Office Today    Care advice provided, patient verbalizes understanding; denies any other questions or concerns; instructed to call back for any new or worsening symptoms. Patient/Caller agrees with recommended disposition; writer provided warm transfer to [employer] at [facility] for appointment scheduling     Attention Provider: Thank you for allowing me to participate in the care of your patient. The patient was connected to triage in response to information provided to the ECC/PSC. Please do not respond through this encounter as the response is not directed to a shared pool.       Reason for Disposition   Patient wants to be seen    Protocols used: Dizziness-ADULT-[place]

## 2022-07-25 ENCOUNTER — TELEPHONE (OUTPATIENT)
Dept: PRIMARY CARE CLINIC | Age: 72
End: 2022-07-25

## 2022-07-25 RX ORDER — ONDANSETRON 4 MG/1
TABLET, ORALLY DISINTEGRATING ORAL
COMMUNITY
Start: 2022-07-18 | End: 2022-09-06

## 2022-07-25 RX ORDER — HYDROCODONE BITARTRATE AND ACETAMINOPHEN 5; 325 MG/1; MG/1
TABLET ORAL
COMMUNITY
Start: 2022-07-18 | End: 2022-09-06

## 2022-07-25 NOTE — TELEPHONE ENCOUNTER
----- Message from Rico Noel MD sent at 7/25/2022  7:41 AM EDT -----  Regarding: appt tomorrow  She is on the schedule for Tuesday for cough and congestion. She does need to have a negative COVID test today in order to be seen in the office otherwise we can do a video visit if she has not completed this yet.

## 2022-09-06 ENCOUNTER — OFFICE VISIT (OUTPATIENT)
Dept: PRIMARY CARE CLINIC | Age: 72
End: 2022-09-06
Payer: MEDICARE

## 2022-09-06 VITALS
BODY MASS INDEX: 29.44 KG/M2 | TEMPERATURE: 97.3 F | HEIGHT: 66 IN | RESPIRATION RATE: 16 BRPM | DIASTOLIC BLOOD PRESSURE: 70 MMHG | WEIGHT: 183.2 LBS | SYSTOLIC BLOOD PRESSURE: 120 MMHG

## 2022-09-06 DIAGNOSIS — F33.41 RECURRENT MAJOR DEPRESSIVE DISORDER, IN PARTIAL REMISSION (HCC): ICD-10-CM

## 2022-09-06 DIAGNOSIS — F51.01 PRIMARY INSOMNIA: Chronic | ICD-10-CM

## 2022-09-06 DIAGNOSIS — G25.81 RESTLESS LEG SYNDROME: Primary | ICD-10-CM

## 2022-09-06 DIAGNOSIS — M81.0 OSTEOPOROSIS, POSTMENOPAUSAL: ICD-10-CM

## 2022-09-06 DIAGNOSIS — R10.13 DYSPEPSIA: ICD-10-CM

## 2022-09-06 DIAGNOSIS — E78.00 PURE HYPERCHOLESTEROLEMIA: ICD-10-CM

## 2022-09-06 DIAGNOSIS — R73.01 IMPAIRED FASTING BLOOD SUGAR: ICD-10-CM

## 2022-09-06 LAB
A/G RATIO: 1.8 (ref 1.1–2.2)
ALBUMIN SERPL-MCNC: 4.6 G/DL (ref 3.4–5)
ALP BLD-CCNC: 64 U/L (ref 40–129)
ALT SERPL-CCNC: 19 U/L (ref 10–40)
ANION GAP SERPL CALCULATED.3IONS-SCNC: 13 MMOL/L (ref 3–16)
AST SERPL-CCNC: 22 U/L (ref 15–37)
BILIRUB SERPL-MCNC: 0.3 MG/DL (ref 0–1)
BUN BLDV-MCNC: 21 MG/DL (ref 7–20)
CALCIUM SERPL-MCNC: 10.2 MG/DL (ref 8.3–10.6)
CHLORIDE BLD-SCNC: 99 MMOL/L (ref 99–110)
CHOLESTEROL, TOTAL: 166 MG/DL (ref 0–199)
CO2: 26 MMOL/L (ref 21–32)
CREAT SERPL-MCNC: <0.5 MG/DL (ref 0.6–1.2)
FOLATE: >20 NG/ML (ref 4.78–24.2)
GFR AFRICAN AMERICAN: >60
GFR NON-AFRICAN AMERICAN: >60
GLUCOSE BLD-MCNC: 86 MG/DL (ref 70–99)
HDLC SERPL-MCNC: 97 MG/DL (ref 40–60)
LDL CHOLESTEROL CALCULATED: ABNORMAL MG/DL
LDL CHOLESTEROL DIRECT: 62 MG/DL
MAGNESIUM: 2 MG/DL (ref 1.8–2.4)
POTASSIUM SERPL-SCNC: 4.9 MMOL/L (ref 3.5–5.1)
SODIUM BLD-SCNC: 138 MMOL/L (ref 136–145)
TOTAL PROTEIN: 7.1 G/DL (ref 6.4–8.2)
TRIGL SERPL-MCNC: 26 MG/DL (ref 0–150)
VITAMIN B-12: 1116 PG/ML (ref 211–911)
VLDLC SERPL CALC-MCNC: ABNORMAL MG/DL

## 2022-09-06 PROCEDURE — 1036F TOBACCO NON-USER: CPT | Performed by: FAMILY MEDICINE

## 2022-09-06 PROCEDURE — 99214 OFFICE O/P EST MOD 30 MIN: CPT | Performed by: FAMILY MEDICINE

## 2022-09-06 PROCEDURE — G8427 DOCREV CUR MEDS BY ELIG CLIN: HCPCS | Performed by: FAMILY MEDICINE

## 2022-09-06 PROCEDURE — G8417 CALC BMI ABV UP PARAM F/U: HCPCS | Performed by: FAMILY MEDICINE

## 2022-09-06 PROCEDURE — 36415 COLL VENOUS BLD VENIPUNCTURE: CPT | Performed by: FAMILY MEDICINE

## 2022-09-06 PROCEDURE — 1123F ACP DISCUSS/DSCN MKR DOCD: CPT | Performed by: FAMILY MEDICINE

## 2022-09-06 PROCEDURE — 1090F PRES/ABSN URINE INCON ASSESS: CPT | Performed by: FAMILY MEDICINE

## 2022-09-06 PROCEDURE — G9899 SCRN MAM PERF RSLTS DOC: HCPCS | Performed by: FAMILY MEDICINE

## 2022-09-06 PROCEDURE — G8400 PT W/DXA NO RESULTS DOC: HCPCS | Performed by: FAMILY MEDICINE

## 2022-09-06 PROCEDURE — 3017F COLORECTAL CA SCREEN DOC REV: CPT | Performed by: FAMILY MEDICINE

## 2022-09-06 RX ORDER — ROPINIROLE 2 MG/1
2 TABLET, FILM COATED, EXTENDED RELEASE ORAL NIGHTLY
Qty: 30 TABLET | Refills: 1 | Status: SHIPPED | OUTPATIENT
Start: 2022-09-06 | End: 2022-09-11

## 2022-09-06 RX ORDER — QUETIAPINE FUMARATE 25 MG/1
25 TABLET, FILM COATED ORAL DAILY
Qty: 90 TABLET | Refills: 1 | Status: SHIPPED | OUTPATIENT
Start: 2022-09-06

## 2022-09-06 RX ORDER — BUPROPION HYDROCHLORIDE 300 MG/1
300 TABLET ORAL EVERY MORNING
Qty: 90 TABLET | Refills: 1 | Status: SHIPPED | OUTPATIENT
Start: 2022-09-06

## 2022-09-06 RX ORDER — PANTOPRAZOLE SODIUM 40 MG/1
40 TABLET, DELAYED RELEASE ORAL DAILY
Qty: 90 TABLET | Refills: 1 | Status: SHIPPED | OUTPATIENT
Start: 2022-09-06

## 2022-09-06 RX ORDER — ALENDRONATE SODIUM 70 MG/1
70 TABLET ORAL
Qty: 12 TABLET | Refills: 3 | Status: SHIPPED | OUTPATIENT
Start: 2022-09-06

## 2022-09-06 RX ORDER — ROSUVASTATIN CALCIUM 5 MG/1
5 TABLET, COATED ORAL NIGHTLY
Qty: 90 TABLET | Refills: 1 | Status: SHIPPED | OUTPATIENT
Start: 2022-09-06

## 2022-09-06 SDOH — ECONOMIC STABILITY: FOOD INSECURITY: WITHIN THE PAST 12 MONTHS, THE FOOD YOU BOUGHT JUST DIDN'T LAST AND YOU DIDN'T HAVE MONEY TO GET MORE.: NEVER TRUE

## 2022-09-06 SDOH — ECONOMIC STABILITY: FOOD INSECURITY: WITHIN THE PAST 12 MONTHS, YOU WORRIED THAT YOUR FOOD WOULD RUN OUT BEFORE YOU GOT MONEY TO BUY MORE.: NEVER TRUE

## 2022-09-06 ASSESSMENT — PATIENT HEALTH QUESTIONNAIRE - PHQ9
5. POOR APPETITE OR OVEREATING: 0
SUM OF ALL RESPONSES TO PHQ QUESTIONS 1-9: 0
10. IF YOU CHECKED OFF ANY PROBLEMS, HOW DIFFICULT HAVE THESE PROBLEMS MADE IT FOR YOU TO DO YOUR WORK, TAKE CARE OF THINGS AT HOME, OR GET ALONG WITH OTHER PEOPLE: 0
7. TROUBLE CONCENTRATING ON THINGS, SUCH AS READING THE NEWSPAPER OR WATCHING TELEVISION: 0
4. FEELING TIRED OR HAVING LITTLE ENERGY: 0
1. LITTLE INTEREST OR PLEASURE IN DOING THINGS: 0
SUM OF ALL RESPONSES TO PHQ QUESTIONS 1-9: 0
6. FEELING BAD ABOUT YOURSELF - OR THAT YOU ARE A FAILURE OR HAVE LET YOURSELF OR YOUR FAMILY DOWN: 0
8. MOVING OR SPEAKING SO SLOWLY THAT OTHER PEOPLE COULD HAVE NOTICED. OR THE OPPOSITE, BEING SO FIGETY OR RESTLESS THAT YOU HAVE BEEN MOVING AROUND A LOT MORE THAN USUAL: 0
2. FEELING DOWN, DEPRESSED OR HOPELESS: 0
SUM OF ALL RESPONSES TO PHQ QUESTIONS 1-9: 0
SUM OF ALL RESPONSES TO PHQ QUESTIONS 1-9: 0
SUM OF ALL RESPONSES TO PHQ9 QUESTIONS 1 & 2: 0
3. TROUBLE FALLING OR STAYING ASLEEP: 0
9. THOUGHTS THAT YOU WOULD BE BETTER OFF DEAD, OR OF HURTING YOURSELF: 0

## 2022-09-06 ASSESSMENT — SOCIAL DETERMINANTS OF HEALTH (SDOH): HOW HARD IS IT FOR YOU TO PAY FOR THE VERY BASICS LIKE FOOD, HOUSING, MEDICAL CARE, AND HEATING?: NOT HARD AT ALL

## 2022-09-06 NOTE — PROGRESS NOTES
omp      PROGRESS NOTE  Date of Service:  9/6/2022    SUBJECTIVE:  Patient ID: Jose Martin Rivera is a 81 y.o. female    ASSESSMENT  1. Restless leg syndrome    2. Pure hypercholesterolemia    3. Dyspepsia    4. Recurrent major depressive disorder, in partial remission ([facility]    5. Primary insomnia    6. Impaired fasting blood sugar    7. Osteoporosis, postmenopausal        PLAN:   1. Restless leg syndrome  Assessment & Plan:   Uncontrolled, changes made today: Changing to extended release version initially may need to increase dose overall  Orders:  -     rOPINIRole (REQUIP XL) 2 MG extended release tablet; Take 1 tablet by mouth nightly, Disp-30 tablet, R-1Normal  -     QUEtiapine (SEROQUEL) 25 MG tablet; Take 1 tablet by mouth daily, Disp-90 tablet, R-1Normal  2. Pure hypercholesterolemia  Assessment & Plan:  Assess labs and adjust dose as needed     Orders:  -     Comprehensive Metabolic Panel  -     Lipid Panel  -     Magnesium  -     Vitamin B12 & Folate  -     rosuvastatin (CRESTOR) 5 MG tablet; Take 1 tablet by mouth nightly, Disp-90 tablet, R-1Normal  3. Dyspepsia  Assessment & Plan:    do want to continue PPI as she is using NSAIDs regularly and has a history of NSAID induced ulcer  Orders:  -     Comprehensive Metabolic Panel  -     Lipid Panel  -     Magnesium  -     Vitamin B12 & Folate  -     pantoprazole (PROTONIX) 40 MG tablet; Take 1 tablet by mouth daily, Disp-90 tablet, R-1Normal  4. Recurrent major depressive disorder, in partial remission ([facility]  Assessment & Plan:   Well-controlled, continue current medications  Orders:  -     buPROPion (WELLBUTRIN XL) 300 MG extended release tablet; Take 1 tablet by mouth every morning, Disp-90 tablet, R-1Normal  5. Primary insomnia  Assessment & Plan:   Well-controlled, continue current medications  Orders:  -     QUEtiapine (SEROQUEL) 25 MG tablet; Take 1 tablet by mouth daily, Disp-90 tablet, R-1Normal  6.  Impaired fasting blood sugar  -     Comprehensive Metabolic Panel  -     Lipid Panel  -     Magnesium  -     Vitamin B12 & Folate  -     Hemoglobin A1C  7. Osteoporosis, postmenopausal  Assessment & Plan:  Continues Fosamax without difficulty we will follow-up with endocrine in the winter   Orders:  -     alendronate (FOSAMAX) 70 MG tablet; Take 1 tablet by mouth every 7 days, Disp-12 tablet, R-3Normal     Return in about 6 months (around 3/6/2023) for Call with restless leg symptom control in a few weeks. HPI:       IFG- denies increased thirst and urination. Following low carb diet. Continues weight loss efforts. Elevated blood pressure-had a history of increased readings however, her blood pressure decreased with weight loss. Hyperlipidemia- on rosuvastatin, no difficulties with this medication    Restless legs-she reports that the Requip is no longer controlling her symptoms. She uses 3 times regularly each day and if wakes in the night will take a fourth dose if unable to fall back asleep. She finds that it does wear off after several hours which can be frustrating      insomnia- on seroquel-- reports occasional continued difficulty falling asleep but this is manageable    Depression-no difficulty with her medication. Symptoms remain controlled at this time    Dyspepsia- on PPI. History gastric ulcer with NSAID use, continues need for nsaid with arthritis . rafael any abdominal pain, nausea, gerd    Migraines-  Headaches several times a month will use maxalt with good results. Does need 2 tablets typically for complete relief has had an increase in frequency of headaches however continues to respond to the Maxalt  Only 1-2 times a month    Osteoporosis- -continues Fosamax  Status post vertebral compression fracture      Aortic calcification seen on echocardiogram in 2016. History mild mitral regurgitation.   History-mild tricuspid regurgitation     Arthritis- taking 3 ibuprofen tablets 3 times a day    She has just started pelvic floor rehabilitation. She does note initial decreased ability to complete the Kegel exercises. Patient's medications, allergies, past medical, surgical, social and family histories were reviewed and updated as appropriate. OBJECTIVE:  Vitals:    09/06/22 1042 09/06/22 1103   BP: (!) 128/90 120/70   Site: Right Upper Arm    Position: Sitting    Cuff Size: Large Adult    Resp: 16    Temp: 97.3 °F (36.3 °C)    TempSrc: Temporal    Weight: 183 lb 3.2 oz (83.1 kg)    Height: 5' 6\" (1.676 m)       Body mass index is 29.57 kg/m². Physical Exam  Vitals reviewed. Constitutional:       Appearance: Normal appearance. HENT:      Head: Normocephalic and atraumatic. Right Ear: External ear normal.      Left Ear: External ear normal.      Mouth/Throat:      Pharynx: No oropharyngeal exudate or posterior oropharyngeal erythema. Eyes:      General: No scleral icterus. Extraocular Movements: Extraocular movements intact. Conjunctiva/sclera: Conjunctivae normal.      Pupils: Pupils are equal, round, and reactive to light. Neck:      Thyroid: No thyroid mass, thyromegaly or thyroid tenderness. Cardiovascular:      Rate and Rhythm: Normal rate and regular rhythm. Pulses: Normal pulses. Heart sounds: Normal heart sounds. Pulmonary:      Effort: Pulmonary effort is normal.      Breath sounds: Normal breath sounds. No wheezing, rhonchi or rales. Musculoskeletal:      Cervical back: No muscular tenderness. Right lower leg: No edema. Left lower leg: No edema. Lymphadenopathy:      Cervical: No cervical adenopathy. Skin:     General: Skin is warm and dry. Neurological:      General: No focal deficit present. Mental Status: She is alert and oriented to person, place, and time. Cranial Nerves: No cranial nerve deficit. Psychiatric:         Mood and Affect: Mood normal.         Behavior: Behavior normal.         Thought Content:  Thought content normal.         Judgment: Judgment normal.           Electronically signed by Lv Harrison MD on 9/6/2022 at 11:22 AM.    Please note this chart was generated using dragon dictation software. Although every effort was made to ensure the accuracy of this automated transcription, some errors in transcription may have occurred.

## 2022-09-07 LAB
ESTIMATED AVERAGE GLUCOSE: 105.4 MG/DL
HBA1C MFR BLD: 5.3 %

## 2022-09-08 ENCOUNTER — TELEPHONE (OUTPATIENT)
Dept: PRIMARY CARE CLINIC | Age: 72
End: 2022-09-08

## 2022-09-08 DIAGNOSIS — G25.81 RESTLESS LEG SYNDROME: ICD-10-CM

## 2022-09-08 NOTE — TELEPHONE ENCOUNTER
Let her know that her insurance denied the extended release version of the Requip. Instead I think we can increase the dose of the Requip and have her that up to 4 times a day as she needs it.   Please see what she would like to do

## 2022-09-09 NOTE — TELEPHONE ENCOUNTER
Spoke to patient, she said she is ok with increasing the dose and frequency of the Requip, but she would like it called in ASAP because she is going out of town tomorrow. Also, she stated that she has not been taking supplemental B12 in several months.

## 2022-09-11 RX ORDER — ROPINIROLE 1 MG/1
1 TABLET, FILM COATED ORAL 3 TIMES DAILY
Qty: 180 TABLET | Refills: 1 | Status: SHIPPED | OUTPATIENT
Start: 2022-09-11 | End: 2022-09-12 | Stop reason: SDUPTHER

## 2022-09-11 NOTE — TELEPHONE ENCOUNTER
Please let her know I have increased the requip to 1 mg- it is recommend to take just take tid so we want to ensure the med dose is correct- if not lasting on tid will increase the dosing . Usually send to mail order- does she need a local as well?

## 2022-09-12 RX ORDER — ROPINIROLE 1 MG/1
1 TABLET, FILM COATED ORAL 3 TIMES DAILY
Qty: 90 TABLET | Refills: 0 | Status: SHIPPED | OUTPATIENT
Start: 2022-09-12

## 2022-09-12 NOTE — TELEPHONE ENCOUNTER
Patient called mentioning how she is on vacation for the next two weeks in [facility] and won't be able to  her prescription at THE [facility]. She was wondering if it could be sent to [address] [place] [address] in [facility] (I added it in her demographics). She doesn't have enough of her prescription to get it through the two weeks of vacation she has left.

## 2022-11-18 DIAGNOSIS — G25.81 RESTLESS LEG SYNDROME: ICD-10-CM

## 2022-11-18 RX ORDER — ROPINIROLE 1 MG/1
TABLET, FILM COATED ORAL
Qty: 270 TABLET | Refills: 1 | Status: SHIPPED | OUTPATIENT
Start: 2022-11-18

## 2022-11-18 NOTE — TELEPHONE ENCOUNTER
Medication:   Requested Prescriptions     Pending Prescriptions Disp Refills    rOPINIRole (REQUIP) 1 MG tablet [Pharmacy Med Name: ROPINIROLE HCL 1 MG Tablet] 270 tablet      Sig: TAKE 1 TABLET THREE TIMES DAILY        Last Filled:  09/12/2022 1mg - 90tablets    Patient Phone Number: [phone] (home)     Last appt: 9/6/2022   Next appt: none    Last OARRS: No flowsheet data found.

## 2022-12-25 PROBLEM — M80.08XD VERTEBRAL FRACTURE, OSTEOPOROTIC, WITH ROUTINE HEALING, SUBSEQUENT ENCOUNTER: Status: ACTIVE | Noted: 2022-12-25

## 2022-12-25 PROBLEM — Z51.81 MEDICATION MONITORING ENCOUNTER: Status: ACTIVE | Noted: 2022-12-25

## 2023-01-24 ENCOUNTER — OFFICE VISIT (OUTPATIENT)
Dept: ENDOCRINOLOGY | Age: 73
End: 2023-01-24
Payer: MEDICARE

## 2023-01-24 ENCOUNTER — HOSPITAL ENCOUNTER (OUTPATIENT)
Dept: GENERAL RADIOLOGY | Age: 73
Discharge: HOME | End: 2023-01-24
Payer: MEDICARE

## 2023-01-24 ENCOUNTER — PROCEDURE VISIT (OUTPATIENT)
Dept: ENDOCRINOLOGY | Age: 73
End: 2023-01-24

## 2023-01-24 VITALS
WEIGHT: 189.2 LBS | BODY MASS INDEX: 30.41 KG/M2 | DIASTOLIC BLOOD PRESSURE: 79 MMHG | HEIGHT: 66 IN | SYSTOLIC BLOOD PRESSURE: 139 MMHG

## 2023-01-24 DIAGNOSIS — M81.0 OSTEOPOROSIS, POSTMENOPAUSAL: Primary | ICD-10-CM

## 2023-01-24 DIAGNOSIS — M81.0 OSTEOPOROSIS, POSTMENOPAUSAL: ICD-10-CM

## 2023-01-24 DIAGNOSIS — Z51.81 MEDICATION MONITORING ENCOUNTER: ICD-10-CM

## 2023-01-24 DIAGNOSIS — M80.08XD VERTEBRAL FRACTURE, OSTEOPOROTIC, WITH ROUTINE HEALING, SUBSEQUENT ENCOUNTER: ICD-10-CM

## 2023-01-24 PROCEDURE — 1123F ACP DISCUSS/DSCN MKR DOCD: CPT | Performed by: INTERNAL MEDICINE

## 2023-01-24 PROCEDURE — G8427 DOCREV CUR MEDS BY ELIG CLIN: HCPCS | Performed by: INTERNAL MEDICINE

## 2023-01-24 PROCEDURE — 1090F PRES/ABSN URINE INCON ASSESS: CPT | Performed by: INTERNAL MEDICINE

## 2023-01-24 PROCEDURE — G8417 CALC BMI ABV UP PARAM F/U: HCPCS | Performed by: INTERNAL MEDICINE

## 2023-01-24 PROCEDURE — 1036F TOBACCO NON-USER: CPT | Performed by: INTERNAL MEDICINE

## 2023-01-24 PROCEDURE — G8399 PT W/DXA RESULTS DOCUMENT: HCPCS | Performed by: INTERNAL MEDICINE

## 2023-01-24 PROCEDURE — 77080 DXA BONE DENSITY AXIAL: CPT | Performed by: INTERNAL MEDICINE

## 2023-01-24 PROCEDURE — 77080 DXA BONE DENSITY AXIAL: CPT

## 2023-01-24 PROCEDURE — 99214 OFFICE O/P EST MOD 30 MIN: CPT | Performed by: INTERNAL MEDICINE

## 2023-01-24 PROCEDURE — 3017F COLORECTAL CA SCREEN DOC REV: CPT | Performed by: INTERNAL MEDICINE

## 2023-01-24 PROCEDURE — G8484 FLU IMMUNIZE NO ADMIN: HCPCS | Performed by: INTERNAL MEDICINE

## 2023-01-24 PROCEDURE — G9899 SCRN MAM PERF RSLTS DOC: HCPCS | Performed by: INTERNAL MEDICINE

## 2023-01-24 NOTE — PROGRESS NOTES
[facility] ([facility]) Osteoporosis and [address] [facility], [address] [facility],[place] [address]  Phone [phone]  Fax [phone]    NAME:  Meghan Woods  :  1950  CONSULT DATE:    2022  MOST RECENT VISIT:  2022  TODAY'S DATE:  2023    Labs @ [facility] 2022    PROBLEMS. Low bone density by DXA 2018, T-scores -1.3 in the spine, -0.5 in the right femoral neck    Family history of osteoporosis, mother    , age 61, T10 fx (fell)    2021 fx T10 (40%), T12 (40%); fell    3 height loss    BMD decreased 1135-3853, T-scores -1.8 in the spine, -0.6 in the right femoral neck  Natural menopause age 56, estrogen x 2 years, stopped due to pulmonary emboli  Shoulder replacements right 2017, left 10/2018  Knee replacements right 2018, left 2020    CURRENT MANAGEMENT FOR BONE HEALTH/OSTEOPOROSIS. Calcium, 300 mg from low calcium foods,  150 mg milk, 300 mg cheese, 200 mg dk green vegs   diet MVI Ca+D other total    Calcium 950 220 975   mg/d   Vitamin D  1000 3000 2000 with Mg  IU/d     25-OH D 75 ng/mL 2021 (desirable is 30-60 ng/mL)  Exercise, walks 45-60 min 3-4 x weekly, recumbent bike  Pharmacologic therapy: Fosamax 70 mg weekly started 2021    PREVIOUS BONE-ACTIVE MEDICATIONS. none    OTHER CURRENT MEDICATIONS (SELECTED): Wellbutrin, Protonix, Seroquel, Requip, Crestor  OTC MEDICATIONS (SELECTED): magnesium, probiotic, B complex    CHIEF COMPLAINT. Here for f/u of osteoporosis, monitoring treatment. No new related signs or symptoms. INTERVAL HISTORY: See problem list for chronic/inactive conditions. She has been taking alendronate correctly and without side effects. No falls, near-falls or fractures. Feels well overall. FOR FULL DETAILS OF FAMILY HISTORY, PAST MEDICAL AND SURGICAL HISTORY, SOCIAL HISTORY, AND REVIEW OF SYSTEMS, SEE PATIENT QUESTIONNAIRE OF TODAY'S DATE. PHYSICAL EXAMINATION. GENERAL.   Well-nourished, well-developed, normally proportioned adult. MENTAL STATUS. Pleasant mood. Oriented to time, place, and person. ORAL. Teeth appear to be in good condition. MUSCULOSKELETAL. Spinal contours are normal.  No spine tenderness to palpation or percussion. One finger space between ribs and pelvis. Gait steady without assistance. NEUROLOGICAL. Able to rise from chair without using arms. No apparent motor or sensory deficit. Coordination appears normal     BONE DENSITY. Most recent done here using Hologic equipment. T-scores   Initial study: 12/13/2018 L1-L2 -0.9 right fem. neck -0.5   Current study: 01/24/2023 L1-L2 -0.8 right fem. neck -0.7     The table below shows bone mineral density (grams/cm2), the appropriate measure for comparing serial scans. A significant increase or decrease is based on precision studies done at our center according to the ISCD protocol with a least significant change of 0.030 g/cm2. PA spine Proximal Femur (right)   Date L1-L2 Fem. neck Trochanter Total hip   12/13/2018 0.881 0.794 --- 0.887   02/22/2021 0.800 0.787 --- 0.908   01/24/2023 0.887 0.773 0.675 0.861     IMPRESSION:  BONE DENSITY IS LOW. SINCE THE LAST DXA, BMD DID NOT CHANGE SIGNIFICANTLY IN THE SPINE OR FEMORAL NECK. IT MAY BE LOWER IN THE TOTAL HIP BUT IS SIMILAR NOW TO 2018. Labs: 11/2021 Ca 9.8 Cr 0.6. 09/2022 Ca 10.2 Cr 0.5. Imaging: DXA printouts reviewed. ASSESSMENT. Postmenopausal osteoporosis. Although BMD is in the osteopenia range, because of her vertebral fractures, the correct clinical diagnosis is osteoporosis.  She is doing well with alendronate started 09/2021. PLANS. Continue alendronate 70 mg weekly. Return appointment with DXA in 1 year. Time spent today: 30-39 minutes. Minh Shaw MD, Director, [facility]) Osteoporosis and Bone Health Services    CC: Richar Martinez MD

## 2023-03-08 SDOH — HEALTH STABILITY: PHYSICAL HEALTH: ON AVERAGE, HOW MANY MINUTES DO YOU ENGAGE IN EXERCISE AT THIS LEVEL?: 30 MIN

## 2023-03-08 SDOH — HEALTH STABILITY: PHYSICAL HEALTH: ON AVERAGE, HOW MANY DAYS PER WEEK DO YOU ENGAGE IN MODERATE TO STRENUOUS EXERCISE (LIKE A BRISK WALK)?: 3 DAYS

## 2023-03-08 ASSESSMENT — PATIENT HEALTH QUESTIONNAIRE - PHQ9
SUM OF ALL RESPONSES TO PHQ QUESTIONS 1-9: 0
SUM OF ALL RESPONSES TO PHQ9 QUESTIONS 1 & 2: 0
SUM OF ALL RESPONSES TO PHQ QUESTIONS 1-9: 0
1. LITTLE INTEREST OR PLEASURE IN DOING THINGS: 0
2. FEELING DOWN, DEPRESSED OR HOPELESS: 0

## 2023-03-08 ASSESSMENT — LIFESTYLE VARIABLES
HOW MANY STANDARD DRINKS CONTAINING ALCOHOL DO YOU HAVE ON A TYPICAL DAY: 1
HOW MANY STANDARD DRINKS CONTAINING ALCOHOL DO YOU HAVE ON A TYPICAL DAY: 1 OR 2
HOW OFTEN DO YOU HAVE A DRINK CONTAINING ALCOHOL: 4
HOW OFTEN DO YOU HAVE A DRINK CONTAINING ALCOHOL: 2-3 TIMES A WEEK
HOW OFTEN DO YOU HAVE SIX OR MORE DRINKS ON ONE OCCASION: 1

## 2023-03-15 ENCOUNTER — OFFICE VISIT (OUTPATIENT)
Dept: PRIMARY CARE CLINIC | Age: 73
End: 2023-03-15

## 2023-03-15 VITALS
HEART RATE: 71 BPM | OXYGEN SATURATION: 99 % | RESPIRATION RATE: 15 BRPM | BODY MASS INDEX: 30.12 KG/M2 | SYSTOLIC BLOOD PRESSURE: 96 MMHG | DIASTOLIC BLOOD PRESSURE: 78 MMHG | WEIGHT: 187.4 LBS | HEIGHT: 66 IN | TEMPERATURE: 97.7 F

## 2023-03-15 DIAGNOSIS — R15.9 INCONTINENCE OF FECES WITH FECAL URGENCY: ICD-10-CM

## 2023-03-15 DIAGNOSIS — F33.41 RECURRENT MAJOR DEPRESSIVE DISORDER, IN PARTIAL REMISSION (HCC): ICD-10-CM

## 2023-03-15 DIAGNOSIS — I70.0 AORTIC CALCIFICATION (HCC): ICD-10-CM

## 2023-03-15 DIAGNOSIS — G43.109 MIGRAINE WITH AURA AND WITHOUT STATUS MIGRAINOSUS, NOT INTRACTABLE: ICD-10-CM

## 2023-03-15 DIAGNOSIS — E78.00 PURE HYPERCHOLESTEROLEMIA: ICD-10-CM

## 2023-03-15 DIAGNOSIS — F51.01 PRIMARY INSOMNIA: Chronic | ICD-10-CM

## 2023-03-15 DIAGNOSIS — R10.13 DYSPEPSIA: ICD-10-CM

## 2023-03-15 DIAGNOSIS — R74.8 ELEVATED VITAMIN B12 LEVEL: ICD-10-CM

## 2023-03-15 DIAGNOSIS — R73.01 IMPAIRED FASTING BLOOD SUGAR: ICD-10-CM

## 2023-03-15 DIAGNOSIS — L40.50 PSORIATIC ARTHRITIS (HCC): ICD-10-CM

## 2023-03-15 DIAGNOSIS — R15.2 INCONTINENCE OF FECES WITH FECAL URGENCY: ICD-10-CM

## 2023-03-15 DIAGNOSIS — G25.81 RESTLESS LEG SYNDROME: ICD-10-CM

## 2023-03-15 DIAGNOSIS — Z00.00 MEDICARE ANNUAL WELLNESS VISIT, SUBSEQUENT: Primary | ICD-10-CM

## 2023-03-15 LAB
BASOPHILS # BLD: 0 K/UL (ref 0–0.2)
BASOPHILS NFR BLD: 0.9 %
DEPRECATED RDW RBC AUTO: 12.6 % (ref 12.4–15.4)
EOSINOPHIL # BLD: 0.1 K/UL (ref 0–0.6)
EOSINOPHIL NFR BLD: 3.4 %
HCT VFR BLD AUTO: 36.9 % (ref 36–48)
HGB BLD-MCNC: 12.3 G/DL (ref 12–16)
LYMPHOCYTES # BLD: 1.1 K/UL (ref 1–5.1)
LYMPHOCYTES NFR BLD: 27.7 %
MCH RBC QN AUTO: 30.7 PG (ref 26–34)
MCHC RBC AUTO-ENTMCNC: 33.4 G/DL (ref 31–36)
MCV RBC AUTO: 92 FL (ref 80–100)
MONOCYTES # BLD: 0.3 K/UL (ref 0–1.3)
MONOCYTES NFR BLD: 7.4 %
NEUTROPHILS # BLD: 2.5 K/UL (ref 1.7–7.7)
NEUTROPHILS NFR BLD: 60.6 %
PLATELET # BLD AUTO: 191 K/UL (ref 135–450)
PMV BLD AUTO: 8.5 FL (ref 5–10.5)
RBC # BLD AUTO: 4.01 M/UL (ref 4–5.2)
WBC # BLD AUTO: 4.1 K/UL (ref 4–11)

## 2023-03-15 RX ORDER — BETAMETHASONE DIPROPIONATE 0.05 %
OINTMENT (GRAM) TOPICAL
COMMUNITY
Start: 2023-03-06

## 2023-03-15 SDOH — ECONOMIC STABILITY: INCOME INSECURITY: HOW HARD IS IT FOR YOU TO PAY FOR THE VERY BASICS LIKE FOOD, HOUSING, MEDICAL CARE, AND HEATING?: NOT HARD AT ALL

## 2023-03-15 SDOH — ECONOMIC STABILITY: FOOD INSECURITY: WITHIN THE PAST 12 MONTHS, YOU WORRIED THAT YOUR FOOD WOULD RUN OUT BEFORE YOU GOT MONEY TO BUY MORE.: NEVER TRUE

## 2023-03-15 SDOH — ECONOMIC STABILITY: HOUSING INSECURITY
IN THE LAST 12 MONTHS, WAS THERE A TIME WHEN YOU DID NOT HAVE A STEADY PLACE TO SLEEP OR SLEPT IN A SHELTER (INCLUDING NOW)?: NO

## 2023-03-15 SDOH — ECONOMIC STABILITY: FOOD INSECURITY: WITHIN THE PAST 12 MONTHS, THE FOOD YOU BOUGHT JUST DIDN'T LAST AND YOU DIDN'T HAVE MONEY TO GET MORE.: NEVER TRUE

## 2023-03-15 ASSESSMENT — PATIENT HEALTH QUESTIONNAIRE - PHQ9
SUM OF ALL RESPONSES TO PHQ9 QUESTIONS 1 & 2: 0
SUM OF ALL RESPONSES TO PHQ QUESTIONS 1-9: 0
9. THOUGHTS THAT YOU WOULD BE BETTER OFF DEAD, OR OF HURTING YOURSELF: 0
4. FEELING TIRED OR HAVING LITTLE ENERGY: 0
SUM OF ALL RESPONSES TO PHQ QUESTIONS 1-9: 0
7. TROUBLE CONCENTRATING ON THINGS, SUCH AS READING THE NEWSPAPER OR WATCHING TELEVISION: 0
8. MOVING OR SPEAKING SO SLOWLY THAT OTHER PEOPLE COULD HAVE NOTICED. OR THE OPPOSITE, BEING SO FIGETY OR RESTLESS THAT YOU HAVE BEEN MOVING AROUND A LOT MORE THAN USUAL: 0
6. FEELING BAD ABOUT YOURSELF - OR THAT YOU ARE A FAILURE OR HAVE LET YOURSELF OR YOUR FAMILY DOWN: 0
SUM OF ALL RESPONSES TO PHQ QUESTIONS 1-9: 0
3. TROUBLE FALLING OR STAYING ASLEEP: 0
2. FEELING DOWN, DEPRESSED OR HOPELESS: 0
5. POOR APPETITE OR OVEREATING: 0
1. LITTLE INTEREST OR PLEASURE IN DOING THINGS: 0
SUM OF ALL RESPONSES TO PHQ QUESTIONS 1-9: 0

## 2023-03-15 NOTE — PROGRESS NOTES
Medicare Annual Wellness Visit    Mechelle Fleming is here for Medicare AWV and Medication Refill    Assessment & Plan   Medicare annual wellness visit, subsequent  Recurrent major depressive disorder, in partial remission (HCC)  -     buPROPion (WELLBUTRIN XL) 300 MG extended release tablet; Take 1 tablet by mouth every morning, Disp-90 tablet, R-1Normal  Aortic calcification (HCC)  Psoriatic arthritis (HCC)  -     AFL - Whit Decker MD, Rheumatology, [facility]  Incontinence of feces with fecal urgency  -     Ambulatory referral to General Surgery  Impaired fasting blood sugar  -     Comprehensive Metabolic Panel  -     Lipid Panel  -     Vitamin B12 & Folate  -     Homocysteine  -     TSH  -     T4, Free  -     CBC with Auto Differential  Pure hypercholesterolemia  -     Comprehensive Metabolic Panel  -     Lipid Panel  -     Vitamin B12 & Folate  -     Homocysteine  -     TSH  -     T4, Free  -     CBC with Auto Differential  -     rosuvastatin (CRESTOR) 5 MG tablet; Take 1 tablet by mouth nightly, Disp-90 tablet, R-1Normal  Elevated vitamin B12 level  -     Vitamin B12 & Folate  -     Homocysteine  -     CBC with Auto Differential  Restless leg syndrome  -     rOPINIRole (REQUIP) 1 MG tablet; TAKE 1 TABLET THREE TIMES DAILY, Disp-270 tablet, R-1Normal  -     QUEtiapine (SEROQUEL) 25 MG tablet; Take 1 tablet by mouth daily, Disp-90 tablet, R-1Normal  Primary insomnia  -     QUEtiapine (SEROQUEL) 25 MG tablet; Take 1 tablet by mouth daily, Disp-90 tablet, R-1Normal  Dyspepsia  -     pantoprazole (PROTONIX) 40 MG tablet; Take 1 tablet by mouth daily, Disp-90 tablet, R-1Normal  Migraine with aura and without status migrainosus, not intractable  -     rizatriptan (MAXALT) 10 MG tablet; Take 1 tablet by mouth once as needed for Migraine, Disp-30 tablet, R-1Normal    Recommendations for Preventive Services Due: see orders and patient instructions/AVS.  Recommended screening schedule for the next 5-10 years  is provided to the patient in written form: see Patient Instructions/AVS.     Return in 6 months (on 9/15/2023). Subjective   The following acute and/or chronic problems were also addressed today:    Recently diagnosed with recurrence of psoriaisis- she had this many years ago and this had not been active until recently. Derm recommend further evaluation due to mulitple joint pains for concern fo psoriatic arthritis. She does take nsaids regularly for joint pain    Osteoporosis- -follows with endocrinology-continues Fosamax  Status post vertebral compression fracture      Aortic calcification seen on echocardiogram in 2016. History mild mitral regurgitation. History-mild tricuspid regurgitation       Some improvement after pelvic floor physical therapy but fecal incont does continue. Patient's complete Health Risk Assessment and screening values have been reviewed and are found in Flowsheets. The following problems were reviewed today and where indicated follow up appointments were made and/or referrals ordered. Positive Risk Factor Screenings with Interventions:                 Weight and Activity:  Physical Activity: Insufficiently Active    Days of Exercise per Week: 3 days    Minutes of Exercise per Session: 30 min     On average, how many days per week do you engage in moderate to strenuous exercise (like a brisk walk)?: 3 days  Have you lost any weight without trying in the past 3 months?: No  Body mass index: (!) 30.24  Obesity Interventions:  See AVS for additional education material                         Objective   Vitals:    03/15/23 1008   BP: 96/78   Site: Right Upper Arm   Position: Sitting   Cuff Size: Large Adult   Pulse: 71   Resp: 15   Temp: 97.7 °F (36.5 °C)   TempSrc: Temporal   SpO2: 99%   Weight: 187 lb 6.4 oz (85 kg)   Height: 5' 6\" (1.676 m)      Body mass index is 30.25 kg/m².              Allergies   Allergen Reactions    Ciprofloxacin Itching and Rash     9/29/20: red,itchy rash.      Sulfamethoxazole-Trimethoprim Itching and Rash     9/29/20: red, itchy rash       Prior to Visit Medications    Medication Sig Taking?  Authorizing Provider   buPROPion (WELLBUTRIN XL) 300 MG extended release tablet Take 1 tablet by mouth every morning Yes Dionisio Rodriguez MD   rosuvastatin (CRESTOR) 5 MG tablet Take 1 tablet by mouth nightly Yes Dioinsio Rodriguez MD   rOPINIRole (REQUIP) 1 MG tablet TAKE 1 TABLET THREE TIMES DAILY Yes Dionisio Rodriguez MD   QUEtiapine (SEROQUEL) 25 MG tablet Take 1 tablet by mouth daily Yes Dionisio Rodriguez MD   pantoprazole (PROTONIX) 40 MG tablet Take 1 tablet by mouth daily Yes Dionisio Rodriguez MD   rizatriptan (MAXALT) 10 MG tablet Take 1 tablet by mouth once as needed for Migraine Yes Dionisio Rodriguez MD   betamethasone dipropionate 0.05 % ointment APPLY TO AFFECTED AREA TWICE A DAY FOR 3 WEEKS THEN 1 WEEK OFF BEFORE RESTARTING Yes Historical Provider, MD   alendronate (FOSAMAX) 70 MG tablet Take 1 tablet by mouth every 7 days Yes Dionisio Rodriguez MD   gabapentin (NEURONTIN) 400 MG capsule  Yes Historical Provider, MD   acetaminophen (TYLENOL) 500 MG tablet Take 1,000 mg by mouth Yes Historical Provider, MD   Probiotic Product (PROBIOTIC DAILY PO) Take by mouth daily Yes Historical Provider, MD   Multiple Vitamin (MULTIVITAMIN ADULT PO) Take by mouth daily Yes Historical Provider, MD       CareTeam (Including outside providers/suppliers regularly involved in providing care):   Patient Care Team:  Dionisio Rodriguez MD as PCP - General (Family Medicine)  Dionisio Rodriguez MD as PCP - Empaneled Provider     Reviewed and updated this visit:  Tobacco  Allergies  Meds  Problems  Med Hx  Surg Hx  Soc Hx  Fam Hx

## 2023-03-15 NOTE — PATIENT INSTRUCTIONS
Advance Directives: Care Instructions  Overview  An advance directive is a legal way to state your wishes at the end of your life. It tells your family and your doctor what to do if you can't say what you want. There are two main types of advance directives. You can change them any time your wishes change. Living will. This form tells your family and your doctor your wishes about life support and other treatment. The form is also called a declaration. Medical power of . This form lets you name a person to make treatment decisions for you when you can't speak for yourself. This person is called a health care agent (health care proxy, health care surrogate). The form is also called a durable power of  for health care. If you do not have an advance directive, decisions about your medical care may be made by a family member, or by a doctor or a  who doesn't know you. It may help to think of an advance directive as a gift to the people who care for you. If you have one, they won't have to make tough decisions by themselves. For more information, including forms for your state, see the [address] website (www.caringinfo.org/planning/advance-directives/). Follow-up care is a key part of your treatment and safety. Be sure to make and go to all appointments, and call your doctor if you are having problems. It's also a good idea to know your test results and keep a list of the medicines you take. What should you include in an advance directive? Many states have a unique advance directive form. (It may ask you to address specific issues.) Or you might use a universal form that's approved by many states. If your form doesn't tell you what to address, it may be hard to know what to include in your advance directive. Use the questions below to help you get started. Who do you want to make decisions about your medical care if you are not able to?   What life-support measures do you want if you have a serious illness that gets worse over time or can't be cured? What are you most afraid of that might happen? (Maybe you're afraid of having pain, losing your independence, or being kept alive by machines.)  Where would you prefer to die? (Your home? A hospital? A nursing home?)  Do you want to donate your organs when you die? Do you want certain Protestant practices performed before you die? When should you call for help? Be sure to contact your doctor if you have any questions. Where can you learn more? Go to http://www.banerjee.com/ and enter R264 to learn more about \"Advance Directives: Care Instructions. \"  Current as of: June 16, 2022               Content Version: 13.5  © 9838-3869 Healthwise, [employer]. Care instructions adapted under license by [facility] ([facility]). If you have questions about a medical condition or this instruction, always ask your healthcare professional. [place] [address] any warranty or liability for your use of this information. Starting a Weight Loss Plan: Care Instructions  Overview     If you're thinking about losing weight, it can be hard to know where to start. Your doctor can help you set up a weight loss plan that best meets your needs. You may want to take a class on nutrition or exercise, or you could join a weight loss support group. If you have questions about how to make changes to your eating or exercise habits, ask your doctor about seeing a registered dietitian or an exercise specialist.  It can be a big challenge to lose weight. But you don't have to make huge changes at once. Make small changes, and stick with them. When those changes become habit, add a few more changes. If you don't think you're ready to make changes right now, try to pick a date in the future. Make an appointment to see your doctor to discuss whether the time is right for you to start a plan. Follow-up care is a key part of your treatment and safety.  Be sure to make and go to all appointments, and call your doctor if you are having problems. It's also a good idea to know your test results and keep a list of the medicines you take. How can you care for yourself at home? Set realistic goals. Many people expect to lose much more weight than is likely. A weight loss of 5% to 10% of your body weight may be enough to improve your health. Get family and friends involved to provide support. Talk to them about why you are trying to lose weight, and ask them to help. They can help by participating in exercise and having meals with you, even if they may be eating something different. Find what works best for you. If you do not have time or do not like to cook, a program that offers meal replacement bars or shakes may be better for you. Or if you like to prepare meals, finding a plan that includes daily menus and recipes may be best.  Ask your doctor about other health professionals who can help you achieve your weight loss goals. A dietitian can help you make healthy changes in your diet. An exercise specialist or  can help you develop a safe and effective exercise program.  A counselor or psychiatrist can help you cope with issues such as depression, anxiety, or family problems that can make it hard to focus on weight loss. Consider joining a support group for people who are trying to lose weight. Your doctor can suggest groups in your area. Where can you learn more? Go to http://www.woods.com/ and enter U357 to learn more about \"Starting a Weight Loss Plan: Care Instructions. \"  Current as of: August 25, 2022               Content Version: 13.5  © 2006-2022 Healthwise, Incorporated. Care instructions adapted under license by [facility] ([facility]).  If you have questions about a medical condition or this instruction, always ask your healthcare professional. [address] any warranty or liability for your use of this information. A Healthy Heart: Care Instructions  Your Care Instructions     Coronary artery disease, also called heart disease, occurs when a substance called plaque builds up in the vessels that supply oxygen-rich blood to your heart muscle. This can narrow the blood vessels and reduce blood flow. A heart attack happens when blood flow is completely blocked. A high-fat diet, smoking, and other factors increase the risk of heart disease. Your doctor has found that you have a chance of having heart disease. You can do lots of things to keep your heart healthy. It may not be easy, but you can change your diet, exercise more, and quit smoking. These steps really work to lower your chance of heart disease. Follow-up care is a key part of your treatment and safety. Be sure to make and go to all appointments, and call your doctor if you are having problems. It's also a good idea to know your test results and keep a list of the medicines you take. How can you care for yourself at home? Diet    Use less salt when you cook and eat. This helps lower your blood pressure. Taste food before salting. Add only a little salt when you think you need it. With time, your taste buds will adjust to less salt.     Eat fewer snack items, fast foods, canned soups, and other high-salt, high-fat, processed foods.     Read food labels and try to avoid saturated and trans fats. They increase your risk of heart disease by raising cholesterol levels.     Limit the amount of solid fat-butter, margarine, and shortening-you eat. Use olive, peanut, or canola oil when you cook. Bake, broil, and steam foods instead of frying them.     Eat a variety of fruit and vegetables every day. Dark green, deep orange, red, or yellow fruits and vegetables are especially good for you. Examples include spinach, carrots, peaches, and berries.     Foods high in fiber can reduce your cholesterol and provide important vitamins and minerals.  High-fiber foods include whole-grain cereals and breads, oatmeal, beans, brown rice, citrus fruits, and apples.     Eat lean proteins. Heart-healthy proteins include seafood, lean meats and poultry, eggs, beans, peas, nuts, seeds, and soy products.     Limit drinks and foods with added sugar. These include candy, desserts, and soda pop. Lifestyle changes    If your doctor recommends it, get more exercise. Walking is a good choice. Bit by bit, increase the amount you walk every day. Try for at least 30 minutes on most days of the week. You also may want to swim, bike, or do other activities.     Do not smoke. If you need help quitting, talk to your doctor about stop-smoking programs and medicines. These can increase your chances of quitting for good. Quitting smoking may be the most important step you can take to protect your heart. It is never too late to quit.     Limit alcohol to 2 drinks a day for men and 1 drink a day for women. Too much alcohol can cause health problems.     Manage other health problems such as diabetes, high blood pressure, and high cholesterol. If you think you may have a problem with alcohol or drug use, talk to your doctor. Medicines    Take your medicines exactly as prescribed. Call your doctor if you think you are having a problem with your medicine.     If your doctor recommends aspirin, take the amount directed each day. Make sure you take aspirin and not another kind of pain reliever, such as acetaminophen (Tylenol). When should you call for help? Call 911 if you have symptoms of a heart attack. These may include:    Chest pain or pressure, or a strange feeling in the chest.     Sweating.     Shortness of breath.     Pain, pressure, or a strange feeling in the back, neck, jaw, or upper belly or in one or both shoulders or arms.     Lightheadedness or sudden weakness.     A fast or irregular heartbeat.    After you call 911, the  may tell you to chew 1 adult-strength or 2 to 4 low-dose aspirin. Wait for an ambulance. Do not try to drive yourself. Watch closely for changes in your health, and be sure to contact your doctor if you have any problems. Where can you learn more? Go to http://www.banerjee.com/ and enter F075 to learn more about \"A Healthy Heart: Care Instructions. \"  Current as of: September 7, 2022               Content Version: 13.5  © 2006-2022 [employer]. Care instructions adapted under license by [facility] ([facility]). If you have questions about a medical condition or this instruction, always ask your healthcare professional. [place] [address] any warranty or liability for your use of this information. Personalized Preventive Plan for Yulissa Child - 3/15/2023  Medicare offers a range of preventive health benefits. Some of the tests and screenings are paid in full while other may be subject to a deductible, co-insurance, and/or copay. Some of these benefits include a comprehensive review of your medical history including lifestyle, illnesses that may run in your family, and various assessments and screenings as appropriate. After reviewing your medical record and screening and assessments performed today your provider may have ordered immunizations, labs, imaging, and/or referrals for you. A list of these orders (if applicable) as well as your Preventive Care list are included within your After Visit Summary for your review. Other Preventive Recommendations:    A preventive eye exam performed by an eye specialist is recommended every 1-2 years to screen for glaucoma; cataracts, macular degeneration, and other eye disorders. A preventive dental visit is recommended every 6 months. Try to get at least 150 minutes of exercise per week or 10,000 steps per day on a pedometer . Order or download the FREE \"Exercise & Physical Activity: Your Everyday Guide\" from [employer].  Call 9-803.748.7361 or search The [employer] online. You need 9108-6593 mg of calcium and 0956-2648 IU of vitamin D per day. It is possible to meet your calcium requirement with diet alone, but a vitamin D supplement is usually necessary to meet this goal.  When exposed to the sun, use a sunscreen that protects against both UVA and UVB radiation with an SPF of 30 or greater. Reapply every 2 to 3 hours or after sweating, drying off with a towel, or swimming. Always wear a seat belt when traveling in a car. Always wear a helmet when riding a bicycle or motorcycle.

## 2023-03-16 DIAGNOSIS — M81.0 OSTEOPOROSIS, POSTMENOPAUSAL: ICD-10-CM

## 2023-03-16 DIAGNOSIS — R10.13 DYSPEPSIA: ICD-10-CM

## 2023-03-16 DIAGNOSIS — E78.00 PURE HYPERCHOLESTEROLEMIA: ICD-10-CM

## 2023-03-16 DIAGNOSIS — G25.81 RESTLESS LEG SYNDROME: ICD-10-CM

## 2023-03-16 DIAGNOSIS — F51.01 PRIMARY INSOMNIA: Chronic | ICD-10-CM

## 2023-03-16 DIAGNOSIS — F33.41 RECURRENT MAJOR DEPRESSIVE DISORDER, IN PARTIAL REMISSION (HCC): ICD-10-CM

## 2023-03-16 LAB
ALBUMIN SERPL-MCNC: 4.5 G/DL (ref 3.4–5)
ALBUMIN/GLOB SERPL: 1.5 {RATIO} (ref 1.1–2.2)
ALP SERPL-CCNC: 67 U/L (ref 40–129)
ALT SERPL-CCNC: 21 U/L (ref 10–40)
ANION GAP SERPL CALCULATED.3IONS-SCNC: 14 MMOL/L (ref 3–16)
AST SERPL-CCNC: 23 U/L (ref 15–37)
BILIRUB SERPL-MCNC: 0.3 MG/DL (ref 0–1)
BUN SERPL-MCNC: 19 MG/DL (ref 7–20)
CALCIUM SERPL-MCNC: 10.5 MG/DL (ref 8.3–10.6)
CHLORIDE SERPL-SCNC: 102 MMOL/L (ref 99–110)
CHOLEST SERPL-MCNC: 162 MG/DL (ref 0–199)
CO2 SERPL-SCNC: 24 MMOL/L (ref 21–32)
CREAT SERPL-MCNC: 0.6 MG/DL (ref 0.6–1.2)
FOLATE SERPL-MCNC: >20 NG/ML (ref 4.78–24.2)
GFR SERPLBLD CREATININE-BSD FMLA CKD-EPI: >60 ML/MIN/{1.73_M2}
GLUCOSE SERPL-MCNC: 103 MG/DL (ref 70–99)
HCYS SERPL-SCNC: 9 UMOL/L (ref 0–10)
HDLC SERPL-MCNC: 82 MG/DL (ref 40–60)
LDLC SERPL CALC-MCNC: 72 MG/DL
POTASSIUM SERPL-SCNC: 5 MMOL/L (ref 3.5–5.1)
PROT SERPL-MCNC: 7.5 G/DL (ref 6.4–8.2)
SODIUM SERPL-SCNC: 140 MMOL/L (ref 136–145)
T4 FREE SERPL-MCNC: 0.9 NG/DL (ref 0.9–1.8)
TRIGL SERPL-MCNC: 38 MG/DL (ref 0–150)
TSH SERPL DL<=0.005 MIU/L-ACNC: 4.93 UIU/ML (ref 0.27–4.2)
VIT B12 SERPL-MCNC: 1381 PG/ML (ref 211–911)
VLDLC SERPL CALC-MCNC: 8 MG/DL

## 2023-03-17 RX ORDER — QUETIAPINE FUMARATE 25 MG/1
25 TABLET, FILM COATED ORAL DAILY
Qty: 90 TABLET | Refills: 1 | Status: CANCELLED | OUTPATIENT
Start: 2023-03-17

## 2023-03-17 RX ORDER — RIZATRIPTAN BENZOATE 10 MG/1
10 TABLET ORAL
Qty: 30 TABLET | Refills: 1 | Status: CANCELLED | OUTPATIENT
Start: 2023-03-17 | End: 2023-03-17

## 2023-03-17 RX ORDER — ROSUVASTATIN CALCIUM 5 MG/1
5 TABLET, COATED ORAL NIGHTLY
Qty: 90 TABLET | Refills: 1 | Status: CANCELLED | OUTPATIENT
Start: 2023-03-17

## 2023-03-17 RX ORDER — PANTOPRAZOLE SODIUM 40 MG/1
40 TABLET, DELAYED RELEASE ORAL DAILY
Qty: 90 TABLET | Refills: 1 | Status: CANCELLED | OUTPATIENT
Start: 2023-03-17

## 2023-03-17 RX ORDER — BUPROPION HYDROCHLORIDE 300 MG/1
300 TABLET ORAL EVERY MORNING
Qty: 90 TABLET | Refills: 1 | Status: CANCELLED | OUTPATIENT
Start: 2023-03-17

## 2023-03-17 RX ORDER — ROPINIROLE 1 MG/1
TABLET, FILM COATED ORAL
Qty: 270 TABLET | Refills: 1 | Status: CANCELLED | OUTPATIENT
Start: 2023-03-17

## 2023-03-19 RX ORDER — PANTOPRAZOLE SODIUM 40 MG/1
40 TABLET, DELAYED RELEASE ORAL DAILY
Qty: 90 TABLET | Refills: 1 | Status: SHIPPED | OUTPATIENT
Start: 2023-03-19

## 2023-03-19 RX ORDER — QUETIAPINE FUMARATE 25 MG/1
25 TABLET, FILM COATED ORAL DAILY
Qty: 90 TABLET | Refills: 1 | Status: SHIPPED | OUTPATIENT
Start: 2023-03-19

## 2023-03-19 RX ORDER — ROSUVASTATIN CALCIUM 5 MG/1
5 TABLET, COATED ORAL NIGHTLY
Qty: 90 TABLET | Refills: 1 | Status: SHIPPED | OUTPATIENT
Start: 2023-03-19

## 2023-03-19 RX ORDER — ROPINIROLE 1 MG/1
TABLET, FILM COATED ORAL
Qty: 270 TABLET | Refills: 1 | Status: SHIPPED | OUTPATIENT
Start: 2023-03-19

## 2023-03-19 RX ORDER — BUPROPION HYDROCHLORIDE 300 MG/1
300 TABLET ORAL EVERY MORNING
Qty: 90 TABLET | Refills: 1 | Status: SHIPPED | OUTPATIENT
Start: 2023-03-19

## 2023-03-19 RX ORDER — RIZATRIPTAN BENZOATE 10 MG/1
10 TABLET ORAL
Qty: 30 TABLET | Refills: 1 | Status: SHIPPED | OUTPATIENT
Start: 2023-03-19 | End: 2023-03-19

## 2023-03-20 DIAGNOSIS — R94.6 ABNORMAL FINDING ON THYROID FUNCTION TEST: Primary | ICD-10-CM

## 2023-03-20 RX ORDER — ALENDRONATE SODIUM 70 MG/1
70 TABLET ORAL
Qty: 12 TABLET | Refills: 3 | OUTPATIENT
Start: 2023-03-20

## 2023-03-31 DIAGNOSIS — M81.0 OSTEOPOROSIS, POSTMENOPAUSAL: ICD-10-CM

## 2023-03-31 RX ORDER — ALENDRONATE SODIUM 70 MG/1
70 TABLET ORAL
Qty: 12 TABLET | Refills: 3 | OUTPATIENT
Start: 2023-03-31

## 2023-04-14 ENCOUNTER — TELEPHONE (OUTPATIENT)
Dept: PRIMARY CARE CLINIC | Age: 73
End: 2023-04-14

## 2023-09-07 ENCOUNTER — OFFICE VISIT (OUTPATIENT)
Dept: PRIMARY CARE CLINIC | Age: 73
End: 2023-09-07
Payer: MEDICARE

## 2023-09-07 ENCOUNTER — HOSPITAL ENCOUNTER (OUTPATIENT)
Dept: GENERAL RADIOLOGY | Age: 73
Discharge: HOME | End: 2023-09-07
Payer: MEDICARE

## 2023-09-07 VITALS
BODY MASS INDEX: 30.22 KG/M2 | DIASTOLIC BLOOD PRESSURE: 70 MMHG | SYSTOLIC BLOOD PRESSURE: 138 MMHG | TEMPERATURE: 98.1 F | WEIGHT: 188 LBS | HEIGHT: 66 IN

## 2023-09-07 DIAGNOSIS — M25.551 RIGHT HIP PAIN: ICD-10-CM

## 2023-09-07 DIAGNOSIS — G43.109 MIGRAINE WITH AURA AND WITHOUT STATUS MIGRAINOSUS, NOT INTRACTABLE: ICD-10-CM

## 2023-09-07 DIAGNOSIS — M81.0 OSTEOPOROSIS, POSTMENOPAUSAL: ICD-10-CM

## 2023-09-07 DIAGNOSIS — R73.01 IMPAIRED FASTING BLOOD SUGAR: Primary | Chronic | ICD-10-CM

## 2023-09-07 DIAGNOSIS — R10.13 DYSPEPSIA: ICD-10-CM

## 2023-09-07 DIAGNOSIS — F51.01 PRIMARY INSOMNIA: Chronic | ICD-10-CM

## 2023-09-07 DIAGNOSIS — G25.81 RESTLESS LEG SYNDROME: ICD-10-CM

## 2023-09-07 DIAGNOSIS — E78.00 PURE HYPERCHOLESTEROLEMIA: Chronic | ICD-10-CM

## 2023-09-07 DIAGNOSIS — F33.41 RECURRENT MAJOR DEPRESSIVE DISORDER, IN PARTIAL REMISSION (HCC): ICD-10-CM

## 2023-09-07 PROCEDURE — G8427 DOCREV CUR MEDS BY ELIG CLIN: HCPCS | Performed by: FAMILY MEDICINE

## 2023-09-07 PROCEDURE — 99214 OFFICE O/P EST MOD 30 MIN: CPT | Performed by: FAMILY MEDICINE

## 2023-09-07 PROCEDURE — 3017F COLORECTAL CA SCREEN DOC REV: CPT | Performed by: FAMILY MEDICINE

## 2023-09-07 PROCEDURE — G8417 CALC BMI ABV UP PARAM F/U: HCPCS | Performed by: FAMILY MEDICINE

## 2023-09-07 PROCEDURE — 73521 X-RAY EXAM HIPS BI 2 VIEWS: CPT

## 2023-09-07 PROCEDURE — 1090F PRES/ABSN URINE INCON ASSESS: CPT | Performed by: FAMILY MEDICINE

## 2023-09-07 PROCEDURE — 36415 COLL VENOUS BLD VENIPUNCTURE: CPT | Performed by: FAMILY MEDICINE

## 2023-09-07 PROCEDURE — G9899 SCRN MAM PERF RSLTS DOC: HCPCS | Performed by: FAMILY MEDICINE

## 2023-09-07 PROCEDURE — G8399 PT W/DXA RESULTS DOCUMENT: HCPCS | Performed by: FAMILY MEDICINE

## 2023-09-07 PROCEDURE — 1123F ACP DISCUSS/DSCN MKR DOCD: CPT | Performed by: FAMILY MEDICINE

## 2023-09-07 PROCEDURE — 1036F TOBACCO NON-USER: CPT | Performed by: FAMILY MEDICINE

## 2023-09-07 RX ORDER — IBUPROFEN 200 MG
200 TABLET ORAL EVERY 6 HOURS PRN
COMMUNITY

## 2023-09-07 ASSESSMENT — PATIENT HEALTH QUESTIONNAIRE - PHQ9
SUM OF ALL RESPONSES TO PHQ9 QUESTIONS 1 & 2: 0
SUM OF ALL RESPONSES TO PHQ QUESTIONS 1-9: 0
2. FEELING DOWN, DEPRESSED OR HOPELESS: 0
SUM OF ALL RESPONSES TO PHQ QUESTIONS 1-9: 0
SUM OF ALL RESPONSES TO PHQ QUESTIONS 1-9: 0
1. LITTLE INTEREST OR PLEASURE IN DOING THINGS: 0
SUM OF ALL RESPONSES TO PHQ QUESTIONS 1-9: 0

## 2023-09-07 NOTE — PROGRESS NOTES
Kayy Garcia is a 54 year old female presenting for No chief complaint on file.    Denies Latex allergy or sensitivity.    Medication verified, no changes  Refills needed today: No    Pt is here for a follow up for repeat Botox injections for chronic migraine. Pt was last seen on 05/26/2020. 170 units were used at last injections.    Reduction in HA frequency: yes  Reduction in intensity: yes  Decrease in use of pain meds/triptans: yes  Reduction in number of ER visits: yes    Pt presents alone today and ready for injections.    Pt is taking topamax 25 mg qhs.   PROGRESS NOTE  Date of Service:  9/7/2023    SUBJECTIVE:  Patient ID: Antwon Roberts is a 72 y.o. female    ASSESSMENT  1. Impaired fasting blood sugar    2. Pure hypercholesterolemia    3. Osteoporosis, postmenopausal    4. Dyspepsia    5. Recurrent major depressive disorder, in partial remission ([address])    6. Restless leg syndrome    7. Primary insomnia    8. Migraine with aura and without status migrainosus, not intractable        PLAN:   1. Impaired fasting blood sugar  -     Comprehensive Metabolic Panel  -     CBC with Auto Differential  -     Lipid Panel  -     TSH with Reflex  -     Vitamin B12 & Folate  -     Hemoglobin A1C  2. Pure hypercholesterolemia  -     Comprehensive Metabolic Panel  -     CBC with Auto Differential  -     Lipid Panel  -     TSH with Reflex  -     Vitamin B12 & Folate  -     rosuvastatin (CRESTOR) 5 MG tablet; Take 1 tablet by mouth nightly, Disp-90 tablet, R-1Normal  3. Osteoporosis, postmenopausal  -     Vitamin D 25 Hydroxy  4. Dyspepsia  -     pantoprazole (PROTONIX) 40 MG tablet; Take 1 tablet by mouth daily, Disp-90 tablet, R-1Normal  5. Recurrent major depressive disorder, in partial remission (HCC)  -     buPROPion (WELLBUTRIN XL) 300 MG extended release tablet; Take 1 tablet by mouth every morning, Disp-90 tablet, R-1Normal  6. Restless leg syndrome  -     rOPINIRole (REQUIP) 1 MG tablet; TAKE 1 TABLET four TIMES DAILY, Disp-360 tablet, R-1Normal  -     QUEtiapine (SEROQUEL) 25 MG tablet; Take 1 tablet by mouth daily, Disp-90 tablet, R-1Normal  7. Primary insomnia  -     QUEtiapine (SEROQUEL) 25 MG tablet; Take 1 tablet by mouth daily, Disp-90 tablet, R-1Normal  8. Migraine with aura and without status migrainosus, not intractable  -     rizatriptan (MAXALT) 10 MG tablet; Take 1 tablet by mouth once as needed for Migraine, Disp-30 tablet, R-1Normal   Overall conditions stable.  Continue current med mgmt  Await xray result for hip  Obtain labs and adjust treatment as

## 2023-09-08 LAB
25(OH)D3 SERPL-MCNC: 78.9 NG/ML
ALBUMIN SERPL-MCNC: 4.8 G/DL (ref 3.4–5)
ALBUMIN/GLOB SERPL: 1.8 {RATIO} (ref 1.1–2.2)
ALP SERPL-CCNC: 64 U/L (ref 40–129)
ALT SERPL-CCNC: 19 U/L (ref 10–40)
ANION GAP SERPL CALCULATED.3IONS-SCNC: 11 MMOL/L (ref 3–16)
AST SERPL-CCNC: 20 U/L (ref 15–37)
BASOPHILS # BLD: 0 K/UL (ref 0–0.2)
BASOPHILS NFR BLD: 0.7 %
BILIRUB SERPL-MCNC: 0.3 MG/DL (ref 0–1)
BUN SERPL-MCNC: 21 MG/DL (ref 7–20)
CALCIUM SERPL-MCNC: 10.5 MG/DL (ref 8.3–10.6)
CHLORIDE SERPL-SCNC: 99 MMOL/L (ref 99–110)
CHOLEST SERPL-MCNC: 153 MG/DL (ref 0–199)
CO2 SERPL-SCNC: 25 MMOL/L (ref 21–32)
CREAT SERPL-MCNC: 0.6 MG/DL (ref 0.6–1.2)
DEPRECATED RDW RBC AUTO: 12.9 % (ref 12.4–15.4)
EOSINOPHIL # BLD: 0.1 K/UL (ref 0–0.6)
EOSINOPHIL NFR BLD: 3.1 %
EST. AVERAGE GLUCOSE BLD GHB EST-MCNC: 119.8 MG/DL
FOLATE SERPL-MCNC: >20 NG/ML (ref 4.78–24.2)
GFR SERPLBLD CREATININE-BSD FMLA CKD-EPI: >60 ML/MIN/{1.73_M2}
GLUCOSE SERPL-MCNC: 113 MG/DL (ref 70–99)
HBA1C MFR BLD: 5.8 %
HCT VFR BLD AUTO: 38.1 % (ref 36–48)
HDLC SERPL-MCNC: 78 MG/DL (ref 40–60)
HGB BLD-MCNC: 12.7 G/DL (ref 12–16)
LDLC SERPL CALC-MCNC: 68 MG/DL
LYMPHOCYTES # BLD: 1.3 K/UL (ref 1–5.1)
LYMPHOCYTES NFR BLD: 29.5 %
MAGNESIUM SERPL-MCNC: 1.8 MG/DL (ref 1.8–2.4)
MCH RBC QN AUTO: 31.5 PG (ref 26–34)
MCHC RBC AUTO-ENTMCNC: 33.4 G/DL (ref 31–36)
MCV RBC AUTO: 94.3 FL (ref 80–100)
MONOCYTES # BLD: 0.3 K/UL (ref 0–1.3)
MONOCYTES NFR BLD: 7.9 %
NEUTROPHILS # BLD: 2.6 K/UL (ref 1.7–7.7)
NEUTROPHILS NFR BLD: 58.8 %
PLATELET # BLD AUTO: 217 K/UL (ref 135–450)
PMV BLD AUTO: 8.8 FL (ref 5–10.5)
POTASSIUM SERPL-SCNC: 5.1 MMOL/L (ref 3.5–5.1)
PROT SERPL-MCNC: 7.5 G/DL (ref 6.4–8.2)
RBC # BLD AUTO: 4.04 M/UL (ref 4–5.2)
SODIUM SERPL-SCNC: 135 MMOL/L (ref 136–145)
T4 FREE SERPL-MCNC: 1.1 NG/DL (ref 0.9–1.8)
TRIGL SERPL-MCNC: 35 MG/DL (ref 0–150)
TSH SERPL DL<=0.005 MIU/L-ACNC: 4.91 UIU/ML (ref 0.27–4.2)
VIT B12 SERPL-MCNC: 1468 PG/ML (ref 211–911)
VLDLC SERPL CALC-MCNC: 7 MG/DL
WBC # BLD AUTO: 4.4 K/UL (ref 4–11)

## 2023-09-08 RX ORDER — BUPROPION HYDROCHLORIDE 300 MG/1
300 TABLET ORAL EVERY MORNING
Qty: 90 TABLET | Refills: 1 | Status: SHIPPED | OUTPATIENT
Start: 2023-09-08

## 2023-09-08 RX ORDER — PANTOPRAZOLE SODIUM 40 MG/1
40 TABLET, DELAYED RELEASE ORAL DAILY
Qty: 90 TABLET | Refills: 1 | Status: SHIPPED | OUTPATIENT
Start: 2023-09-08

## 2023-09-08 RX ORDER — ROSUVASTATIN CALCIUM 5 MG/1
5 TABLET, COATED ORAL NIGHTLY
Qty: 90 TABLET | Refills: 1 | Status: SHIPPED | OUTPATIENT
Start: 2023-09-08

## 2023-09-08 RX ORDER — QUETIAPINE FUMARATE 25 MG/1
25 TABLET, FILM COATED ORAL DAILY
Qty: 90 TABLET | Refills: 1 | Status: SHIPPED | OUTPATIENT
Start: 2023-09-08

## 2023-09-08 RX ORDER — ROPINIROLE 1 MG/1
TABLET, FILM COATED ORAL
Qty: 360 TABLET | Refills: 1 | Status: SHIPPED | OUTPATIENT
Start: 2023-09-08

## 2023-09-08 RX ORDER — RIZATRIPTAN BENZOATE 10 MG/1
10 TABLET ORAL
Qty: 30 TABLET | Refills: 1 | Status: SHIPPED | OUTPATIENT
Start: 2023-09-08 | End: 2023-09-08

## 2024-01-02 DIAGNOSIS — M81.0 OSTEOPOROSIS, POSTMENOPAUSAL: ICD-10-CM

## 2024-01-03 RX ORDER — ALENDRONATE SODIUM 70 MG/1
70 TABLET ORAL
Qty: 12 TABLET | Refills: 3 | OUTPATIENT
Start: 2024-01-03

## 2024-01-03 NOTE — TELEPHONE ENCOUNTER
Medication:   Requested Prescriptions     Pending Prescriptions Disp Refills    alendronate (FOSAMAX) 70 MG tablet [Pharmacy Med Name: ALENDRONATE SODIUM TABS 4'S 70MG] 12 tablet 3     Sig: TAKE 1 TABLET EVERY 7 DAYS        Last Filled:  4/11/23 #12 7 refill    Patient Phone Number: [phone] (home)     Last appt: 9/7/2023 Return for with testing results.   Next appt: Visit date not found    Last OARRS:        No data to display

## 2024-01-16 DIAGNOSIS — M81.0 OSTEOPOROSIS, POSTMENOPAUSAL: ICD-10-CM

## 2024-01-16 RX ORDER — ALENDRONATE SODIUM 70 MG/1
70 TABLET ORAL
Qty: 12 TABLET | Refills: 3 | Status: SHIPPED | OUTPATIENT
Start: 2024-01-16

## 2024-01-16 NOTE — TELEPHONE ENCOUNTER
Medication:   Requested Prescriptions     Pending Prescriptions Disp Refills    alendronate (FOSAMAX) 70 MG tablet 12 tablet 3     Sig: Take 1 tablet by mouth every 7 days       Last Filled:      Patient Phone Number: [phone] (home)     Last appt: 1/24/2023   Next appt: 11/26/24    Last Labs DM:   Lab Results   Component Value Date/Time    LABA1C 5.8 09/07/2023 10:57 AM     Last Lipid:   Lab Results   Component Value Date/Time    CHOL 153 09/07/2023 10:57 AM    TRIG 35 09/07/2023 10:57 AM    HDL 78 09/07/2023 10:57 AM    LDLCALC 68 09/07/2023 10:57 AM     Last PSA: No results found for: \"PSA\"  Last Thyroid:   Lab Results   Component Value Date/Time    TSH 2.93 04/12/2023 11:33 AM    T4FREE 1.1 09/07/2023 10:57 AM            PROVIDER:[TOKEN:[[id number]]]

## 2024-02-19 NOTE — FLOWSHEET NOTE
squats  2 10    Bosu Bal and Prop- G Med       Single leg stance/Balance/Prop foam 30 3    HR/TR       Tandem balance  1' 3               Therapeutic Exercise and NMR EXR  [x] (56258) Provided verbal/tactile cueing for activities related to strengthening, flexibility, endurance, ROM for improvements in LE, proximal hip, and core control with self care, mobility, lifting, ambulation.  [] (31380) Provided verbal/tactile cueing for activities related to improving balance, coordination, kinesthetic sense, posture, motor skill, proprioception  to assist with LE, proximal hip, and core control in self care, mobility, lifting, ambulation and eccentric single leg control.      NMR and Therapeutic Activities:    [] (69678 or 64014) Provided verbal/tactile cueing for activities related to improving balance, coordination, kinesthetic sense, posture, motor skill, proprioception and motor activation to allow for proper function of core, proximal hip and LE with self care and ADLs  [] (63831) Gait Re-education- Provided training and instruction to the patient for proper LE, core and proximal hip recruitment and positioning and eccentric body weight control with ambulation re-education including up and down stairs     Home Exercise Program:    [x] (37909) Reviewed/Progressed HEP activities related to strengthening, flexibility, endurance, ROM of core, proximal hip and LE for functional self-care, mobility, lifting and ambulation/stair navigation   [] (98845)Reviewed/Progressed HEP activities related to improving balance, coordination, kinesthetic sense, posture, motor skill, proprioception of core, proximal hip and LE for self care, mobility, lifting, and ambulation/stair navigation      Manual Treatments:  PROM / STM / Oscillations-Mobs:  G-I, II, III, IV (PA's, Inf., Post.)  [] (74044) Provided manual therapy to mobilize LE, proximal hip and/or LS spine soft tissue/joints for the purpose of modulating pain, promoting relaxation,
Attending Attestation (For Attendings USE Only)...

## 2024-03-05 ASSESSMENT — PATIENT HEALTH QUESTIONNAIRE - PHQ9
8. MOVING OR SPEAKING SO SLOWLY THAT OTHER PEOPLE COULD HAVE NOTICED. OR THE OPPOSITE, BEING SO FIGETY OR RESTLESS THAT YOU HAVE BEEN MOVING AROUND A LOT MORE THAN USUAL: 0
4. FEELING TIRED OR HAVING LITTLE ENERGY: 1
SUM OF ALL RESPONSES TO PHQ QUESTIONS 1-9: 2
4. FEELING TIRED OR HAVING LITTLE ENERGY: SEVERAL DAYS
8. MOVING OR SPEAKING SO SLOWLY THAT OTHER PEOPLE COULD HAVE NOTICED. OR THE OPPOSITE - BEING SO FIDGETY OR RESTLESS THAT YOU HAVE BEEN MOVING AROUND A LOT MORE THAN USUAL: NOT AT ALL
6. FEELING BAD ABOUT YOURSELF - OR THAT YOU ARE A FAILURE OR HAVE LET YOURSELF OR YOUR FAMILY DOWN: 0
5. POOR APPETITE OR OVEREATING: 0
9. THOUGHTS THAT YOU WOULD BE BETTER OFF DEAD, OR OF HURTING YOURSELF: 0
9. THOUGHTS THAT YOU WOULD BE BETTER OFF DEAD, OR OF HURTING YOURSELF: NOT AT ALL
6. FEELING BAD ABOUT YOURSELF - OR THAT YOU ARE A FAILURE OR HAVE LET YOURSELF OR YOUR FAMILY DOWN: NOT AT ALL
2. FEELING DOWN, DEPRESSED OR HOPELESS: NOT AT ALL
5. POOR APPETITE OR OVEREATING: NOT AT ALL
SUM OF ALL RESPONSES TO PHQ9 QUESTIONS 1 & 2: 0
SUM OF ALL RESPONSES TO PHQ QUESTIONS 1-9: 2
SUM OF ALL RESPONSES TO PHQ QUESTIONS 1-9: 2
2. FEELING DOWN, DEPRESSED OR HOPELESS: 0
3. TROUBLE FALLING OR STAYING ASLEEP: SEVERAL DAYS
7. TROUBLE CONCENTRATING ON THINGS, SUCH AS READING THE NEWSPAPER OR WATCHING TELEVISION: 0
1. LITTLE INTEREST OR PLEASURE IN DOING THINGS: 0
10. IF YOU CHECKED OFF ANY PROBLEMS, HOW DIFFICULT HAVE THESE PROBLEMS MADE IT FOR YOU TO DO YOUR WORK, TAKE CARE OF THINGS AT HOME, OR GET ALONG WITH OTHER PEOPLE: NOT DIFFICULT AT ALL
SUM OF ALL RESPONSES TO PHQ QUESTIONS 1-9: 2
SUM OF ALL RESPONSES TO PHQ QUESTIONS 1-9: 2
10. IF YOU CHECKED OFF ANY PROBLEMS, HOW DIFFICULT HAVE THESE PROBLEMS MADE IT FOR YOU TO DO YOUR WORK, TAKE CARE OF THINGS AT HOME, OR GET ALONG WITH OTHER PEOPLE: 0
7. TROUBLE CONCENTRATING ON THINGS, SUCH AS READING THE NEWSPAPER OR WATCHING TELEVISION: NOT AT ALL
3. TROUBLE FALLING OR STAYING ASLEEP: 1
1. LITTLE INTEREST OR PLEASURE IN DOING THINGS: NOT AT ALL

## 2024-03-08 ENCOUNTER — OFFICE VISIT (OUTPATIENT)
Dept: PRIMARY CARE CLINIC | Age: 74
End: 2024-03-08
Payer: MEDICARE

## 2024-03-08 VITALS
DIASTOLIC BLOOD PRESSURE: 82 MMHG | SYSTOLIC BLOOD PRESSURE: 102 MMHG | RESPIRATION RATE: 12 BRPM | BODY MASS INDEX: 30.02 KG/M2 | HEART RATE: 76 BPM | WEIGHT: 186.8 LBS | HEIGHT: 66 IN | TEMPERATURE: 97.4 F

## 2024-03-08 DIAGNOSIS — L40.50 PSORIATIC ARTHRITIS (HCC): ICD-10-CM

## 2024-03-08 DIAGNOSIS — F33.41 RECURRENT MAJOR DEPRESSIVE DISORDER, IN PARTIAL REMISSION (HCC): ICD-10-CM

## 2024-03-08 DIAGNOSIS — E78.00 PURE HYPERCHOLESTEROLEMIA: Chronic | ICD-10-CM

## 2024-03-08 DIAGNOSIS — R73.01 IMPAIRED FASTING BLOOD SUGAR: Primary | Chronic | ICD-10-CM

## 2024-03-08 DIAGNOSIS — G25.81 RESTLESS LEG SYNDROME: ICD-10-CM

## 2024-03-08 DIAGNOSIS — R10.13 DYSPEPSIA: ICD-10-CM

## 2024-03-08 DIAGNOSIS — I70.0 AORTIC CALCIFICATION (HCC): ICD-10-CM

## 2024-03-08 DIAGNOSIS — F51.01 PRIMARY INSOMNIA: Chronic | ICD-10-CM

## 2024-03-08 DIAGNOSIS — G43.109 MIGRAINE WITH AURA AND WITHOUT STATUS MIGRAINOSUS, NOT INTRACTABLE: ICD-10-CM

## 2024-03-08 DIAGNOSIS — R94.6 ABNORMAL FINDING ON THYROID FUNCTION TEST: ICD-10-CM

## 2024-03-08 LAB
T4 FREE SERPL-MCNC: 1.1 NG/DL (ref 0.9–1.8)
TSH SERPL DL<=0.005 MIU/L-ACNC: 5.02 UIU/ML (ref 0.27–4.2)

## 2024-03-08 PROCEDURE — G9899 SCRN MAM PERF RSLTS DOC: HCPCS | Performed by: FAMILY MEDICINE

## 2024-03-08 PROCEDURE — 99214 OFFICE O/P EST MOD 30 MIN: CPT | Performed by: FAMILY MEDICINE

## 2024-03-08 PROCEDURE — G8427 DOCREV CUR MEDS BY ELIG CLIN: HCPCS | Performed by: FAMILY MEDICINE

## 2024-03-08 PROCEDURE — 36415 COLL VENOUS BLD VENIPUNCTURE: CPT | Performed by: FAMILY MEDICINE

## 2024-03-08 PROCEDURE — 1123F ACP DISCUSS/DSCN MKR DOCD: CPT | Performed by: FAMILY MEDICINE

## 2024-03-08 PROCEDURE — 1036F TOBACCO NON-USER: CPT | Performed by: FAMILY MEDICINE

## 2024-03-08 PROCEDURE — G8417 CALC BMI ABV UP PARAM F/U: HCPCS | Performed by: FAMILY MEDICINE

## 2024-03-08 PROCEDURE — G8484 FLU IMMUNIZE NO ADMIN: HCPCS | Performed by: FAMILY MEDICINE

## 2024-03-08 PROCEDURE — G8399 PT W/DXA RESULTS DOCUMENT: HCPCS | Performed by: FAMILY MEDICINE

## 2024-03-08 PROCEDURE — 3017F COLORECTAL CA SCREEN DOC REV: CPT | Performed by: FAMILY MEDICINE

## 2024-03-08 PROCEDURE — G2211 COMPLEX E/M VISIT ADD ON: HCPCS | Performed by: FAMILY MEDICINE

## 2024-03-08 PROCEDURE — 1090F PRES/ABSN URINE INCON ASSESS: CPT | Performed by: FAMILY MEDICINE

## 2024-03-08 RX ORDER — RIZATRIPTAN BENZOATE 10 MG/1
10 TABLET ORAL
Qty: 30 TABLET | Refills: 1 | Status: SHIPPED | OUTPATIENT
Start: 2024-03-08 | End: 2024-03-08

## 2024-03-08 RX ORDER — QUETIAPINE FUMARATE 25 MG/1
25 TABLET, FILM COATED ORAL DAILY
Qty: 90 TABLET | Refills: 1 | Status: SHIPPED | OUTPATIENT
Start: 2024-03-08

## 2024-03-08 RX ORDER — GABAPENTIN 300 MG/1
CAPSULE ORAL
COMMUNITY
Start: 2024-02-15

## 2024-03-08 RX ORDER — ROPINIROLE 1 MG/1
TABLET, FILM COATED ORAL
Qty: 360 TABLET | Refills: 1 | Status: SHIPPED | OUTPATIENT
Start: 2024-03-08

## 2024-03-08 RX ORDER — PANTOPRAZOLE SODIUM 40 MG/1
40 TABLET, DELAYED RELEASE ORAL DAILY
Qty: 90 TABLET | Refills: 1 | Status: SHIPPED | OUTPATIENT
Start: 2024-03-08

## 2024-03-08 RX ORDER — BUPROPION HYDROCHLORIDE 300 MG/1
300 TABLET ORAL EVERY MORNING
Qty: 90 TABLET | Refills: 1 | Status: SHIPPED | OUTPATIENT
Start: 2024-03-08

## 2024-03-08 RX ORDER — ROSUVASTATIN CALCIUM 5 MG/1
5 TABLET, COATED ORAL NIGHTLY
Qty: 90 TABLET | Refills: 1 | Status: SHIPPED | OUTPATIENT
Start: 2024-03-08

## 2024-03-08 NOTE — PATIENT INSTRUCTIONS
I do recommend an update to your tetanus shot. This is done every 10 years. Please obtain this at your local pharmacy in order for insurance to cover the cost.

## 2024-03-08 NOTE — PROGRESS NOTES
PROGRESS NOTE  Date of Service:  3/8/2024    SUBJECTIVE:  Patient ID: Mechelle Fleming is a 73 y.o. female    ASSESSMENT  1. Impaired fasting blood sugar    2. Recurrent major depressive disorder, in partial remission (HCC)    3. Aortic calcification (HCC)    4. Abnormal finding on thyroid function test    5. Pure hypercholesterolemia    6. Restless leg syndrome    7. Primary insomnia    8. Migraine with aura and without status migrainosus, not intractable    9. Dyspepsia    10. Psoriatic arthritis (HCC)        PLAN:   1. Impaired fasting blood sugar  -     Hemoglobin A1C  2. Recurrent major depressive disorder, in partial remission (HCC)  -     buPROPion (WELLBUTRIN XL) 300 MG extended release tablet; Take 1 tablet by mouth every morning, Disp-90 tablet, R-1Normal  3. Aortic calcification (HCC)  4. Abnormal finding on thyroid function test  -     TSH with Reflex  5. Pure hypercholesterolemia  -     rosuvastatin (CRESTOR) 5 MG tablet; Take 1 tablet by mouth nightly, Disp-90 tablet, R-1Normal  6. Restless leg syndrome  -     rOPINIRole (REQUIP) 1 MG tablet; TAKE 1 TABLET four TIMES DAILY, Disp-360 tablet, R-1Normal  -     QUEtiapine (SEROQUEL) 25 MG tablet; Take 1 tablet by mouth daily, Disp-90 tablet, R-1Normal  7. Primary insomnia  -     QUEtiapine (SEROQUEL) 25 MG tablet; Take 1 tablet by mouth daily, Disp-90 tablet, R-1Normal  8. Migraine with aura and without status migrainosus, not intractable  -     rizatriptan (MAXALT) 10 MG tablet; Take 1 tablet by mouth once as needed for Migraine, Disp-30 tablet, R-1Normal  9. Dyspepsia  -     pantoprazole (PROTONIX) 40 MG tablet; Take 1 tablet by mouth daily, Disp-90 tablet, R-1Normal  10. Psoriatic arthritis (HCC)     Medical conditions are overall stable.  No change in medications today  Will recheck thyroid I discussed subclinical hypothyroidism and could consider use of levothyroxine if abnormal TFTs continue  This may assist with ability to lose

## 2024-03-09 DIAGNOSIS — E03.8 SUBCLINICAL HYPOTHYROIDISM: Primary | ICD-10-CM

## 2024-03-09 LAB
EST. AVERAGE GLUCOSE BLD GHB EST-MCNC: 111.2 MG/DL
HBA1C MFR BLD: 5.5 %

## 2024-03-11 PROBLEM — R94.6 ABNORMAL FINDING ON THYROID FUNCTION TEST: Status: ACTIVE | Noted: 2024-03-11

## 2024-03-11 RX ORDER — BETAMETHASONE DIPROPIONATE 0.05 %
OINTMENT (GRAM) TOPICAL
Status: CANCELLED | OUTPATIENT
Start: 2024-03-11

## 2024-03-11 RX ORDER — LEVOTHYROXINE SODIUM 0.05 MG/1
50 TABLET ORAL DAILY
Qty: 90 TABLET | OUTPATIENT
Start: 2024-03-11

## 2024-03-11 RX ORDER — LEVOTHYROXINE SODIUM 0.05 MG/1
50 TABLET ORAL DAILY
Qty: 30 TABLET | Refills: 1 | Status: SHIPPED | OUTPATIENT
Start: 2024-03-11

## 2024-03-11 NOTE — TELEPHONE ENCOUNTER
Denied -     Patient to follow up in 6 weeks - and recently filled for short supply due to potential dose adjustment needed    Medication:   Requested Prescriptions     Pending Prescriptions Disp Refills    levothyroxine (SYNTHROID) 50 MCG tablet [Pharmacy Med Name: LEVOTHYROXINE 0.05MG (50MCG) TAB] 90 tablet      Sig: TAKE 1 TABLET BY MOUTH DAILY       Last Filled:  03.11.2024 #30 w/ 1 refill     Patient Phone Number: [phone] (home)     Last appt: 3/8/2024   Next appt: Visit date not found    Last Thyroid:   Lab Results   Component Value Date/Time    TSH 2.93 04/12/2023 11:33 AM    T4FREE 1.1 03/08/2024 11:00 AM

## 2024-04-15 SDOH — ECONOMIC STABILITY: FOOD INSECURITY: WITHIN THE PAST 12 MONTHS, YOU WORRIED THAT YOUR FOOD WOULD RUN OUT BEFORE YOU GOT MONEY TO BUY MORE.: NEVER TRUE

## 2024-04-15 SDOH — HEALTH STABILITY: PHYSICAL HEALTH: ON AVERAGE, HOW MANY MINUTES DO YOU ENGAGE IN EXERCISE AT THIS LEVEL?: 40 MIN

## 2024-04-15 SDOH — ECONOMIC STABILITY: INCOME INSECURITY: HOW HARD IS IT FOR YOU TO PAY FOR THE VERY BASICS LIKE FOOD, HOUSING, MEDICAL CARE, AND HEATING?: NOT VERY HARD

## 2024-04-15 SDOH — HEALTH STABILITY: PHYSICAL HEALTH: ON AVERAGE, HOW MANY DAYS PER WEEK DO YOU ENGAGE IN MODERATE TO STRENUOUS EXERCISE (LIKE A BRISK WALK)?: 4 DAYS

## 2024-04-15 SDOH — ECONOMIC STABILITY: FOOD INSECURITY: WITHIN THE PAST 12 MONTHS, THE FOOD YOU BOUGHT JUST DIDN'T LAST AND YOU DIDN'T HAVE MONEY TO GET MORE.: NEVER TRUE

## 2024-04-15 SDOH — ECONOMIC STABILITY: TRANSPORTATION INSECURITY
IN THE PAST 12 MONTHS, HAS LACK OF TRANSPORTATION KEPT YOU FROM MEETINGS, WORK, OR FROM GETTING THINGS NEEDED FOR DAILY LIVING?: NO

## 2024-04-15 ASSESSMENT — PATIENT HEALTH QUESTIONNAIRE - PHQ9
8. MOVING OR SPEAKING SO SLOWLY THAT OTHER PEOPLE COULD HAVE NOTICED. OR THE OPPOSITE, BEING SO FIGETY OR RESTLESS THAT YOU HAVE BEEN MOVING AROUND A LOT MORE THAN USUAL: NOT AT ALL
2. FEELING DOWN, DEPRESSED OR HOPELESS: NOT AT ALL
SUM OF ALL RESPONSES TO PHQ QUESTIONS 1-9: 2
3. TROUBLE FALLING OR STAYING ASLEEP: SEVERAL DAYS
10. IF YOU CHECKED OFF ANY PROBLEMS, HOW DIFFICULT HAVE THESE PROBLEMS MADE IT FOR YOU TO DO YOUR WORK, TAKE CARE OF THINGS AT HOME, OR GET ALONG WITH OTHER PEOPLE: NOT DIFFICULT AT ALL
4. FEELING TIRED OR HAVING LITTLE ENERGY: SEVERAL DAYS
SUM OF ALL RESPONSES TO PHQ QUESTIONS 1-9: 2
SUM OF ALL RESPONSES TO PHQ9 QUESTIONS 1 & 2: 0
7. TROUBLE CONCENTRATING ON THINGS, SUCH AS READING THE NEWSPAPER OR WATCHING TELEVISION: NOT AT ALL
9. THOUGHTS THAT YOU WOULD BE BETTER OFF DEAD, OR OF HURTING YOURSELF: NOT AT ALL
1. LITTLE INTEREST OR PLEASURE IN DOING THINGS: NOT AT ALL
6. FEELING BAD ABOUT YOURSELF - OR THAT YOU ARE A FAILURE OR HAVE LET YOURSELF OR YOUR FAMILY DOWN: NOT AT ALL
SUM OF ALL RESPONSES TO PHQ QUESTIONS 1-9: 2
SUM OF ALL RESPONSES TO PHQ QUESTIONS 1-9: 2
5. POOR APPETITE OR OVEREATING: NOT AT ALL

## 2024-04-15 ASSESSMENT — LIFESTYLE VARIABLES
HOW OFTEN DO YOU HAVE A DRINK CONTAINING ALCOHOL: 3
HOW OFTEN DO YOU HAVE A DRINK CONTAINING ALCOHOL: 2-4 TIMES A MONTH
HOW MANY STANDARD DRINKS CONTAINING ALCOHOL DO YOU HAVE ON A TYPICAL DAY: 1
HOW MANY STANDARD DRINKS CONTAINING ALCOHOL DO YOU HAVE ON A TYPICAL DAY: 1 OR 2
HOW OFTEN DO YOU HAVE SIX OR MORE DRINKS ON ONE OCCASION: 1

## 2024-04-18 ENCOUNTER — OFFICE VISIT (OUTPATIENT)
Dept: PRIMARY CARE CLINIC | Age: 74
End: 2024-04-18
Payer: MEDICARE

## 2024-04-18 VITALS
SYSTOLIC BLOOD PRESSURE: 138 MMHG | DIASTOLIC BLOOD PRESSURE: 88 MMHG | BODY MASS INDEX: 30.82 KG/M2 | WEIGHT: 185 LBS | TEMPERATURE: 97.2 F | HEART RATE: 60 BPM | HEIGHT: 65 IN

## 2024-04-18 DIAGNOSIS — E03.9 ACQUIRED HYPOTHYROIDISM: ICD-10-CM

## 2024-04-18 DIAGNOSIS — Z12.31 ENCOUNTER FOR SCREENING MAMMOGRAM FOR BREAST CANCER: ICD-10-CM

## 2024-04-18 DIAGNOSIS — Z00.00 MEDICARE ANNUAL WELLNESS VISIT, SUBSEQUENT: Primary | ICD-10-CM

## 2024-04-18 PROBLEM — R94.6 ABNORMAL FINDING ON THYROID FUNCTION TEST: Status: RESOLVED | Noted: 2024-03-11 | Resolved: 2024-04-18

## 2024-04-18 PROBLEM — M80.08XD VERTEBRAL FRACTURE, OSTEOPOROTIC, WITH ROUTINE HEALING, SUBSEQUENT ENCOUNTER: Status: RESOLVED | Noted: 2022-12-25 | Resolved: 2024-04-18

## 2024-04-18 PROCEDURE — G0439 PPPS, SUBSEQ VISIT: HCPCS | Performed by: FAMILY MEDICINE

## 2024-04-18 PROCEDURE — 1123F ACP DISCUSS/DSCN MKR DOCD: CPT | Performed by: FAMILY MEDICINE

## 2024-04-18 PROCEDURE — 3017F COLORECTAL CA SCREEN DOC REV: CPT | Performed by: FAMILY MEDICINE

## 2024-04-18 PROCEDURE — 36415 COLL VENOUS BLD VENIPUNCTURE: CPT | Performed by: FAMILY MEDICINE

## 2024-04-18 NOTE — PROGRESS NOTES
Medicare Annual Wellness Visit    Mechelle Fleming is here for Medicare AWV    Assessment & Plan   Medicare annual wellness visit, subsequent  Acquired hypothyroidism  -     TSH  -     T4, Free  -     Thyroid Peroxidase Antibody  Encounter for screening mammogram for breast cancer  -     CH[facility] ELVIA DIGITAL SCREEN BILATERAL; Future   Do recommend monitoring blood pressure and continue exercise, lower salt diet.    Recommendations for Preventive Services Due: see orders and patient instructions/AVS.  Recommended screening schedule for the next 5-10 years is provided to the patient in written form: see Patient Instructions/AVS.     Return for with testing results.     Subjective   The following acute and/or chronic problems were also addressed today:      Hypothyroidism- started replacement 3/24 and reports since that time she has not gained weight but not lost, no change in fatigue, no palpitations, no diarrhea,     No previous history increased blood pressure readings, no ha, chest pain, edema      Patient's complete Health Risk Assessment and screening values have been reviewed and are found in Flowsheets. The following problems were reviewed today and where indicated follow up appointments were made and/or referrals ordered.    Positive Risk Factor Screenings with Interventions:                Activity, Diet, and Weight:  On average, how many days per week do you engage in moderate to strenuous exercise (like a brisk walk)?: 4 days  On average, how many minutes do you engage in exercise at this level?: 40 min    Do you eat balanced/healthy meals regularly?: Yes    Body mass index is 30.32 kg/m². (!) Abnormal    Obesity Interventions:  See AVS for additional education material                           Objective   Vitals:    04/18/24 1054 04/18/24 1114   BP: (!) 152/78 138/88   Site: Left Upper Arm    Position: Sitting    Cuff Size: Large Adult    Pulse: 60    Temp: 97.2 °F (36.2 °C)    Weight: 83.9 kg (185 lb)

## 2024-04-18 NOTE — PATIENT INSTRUCTIONS
Personalized Preventive Plan for Mechelle Fleming - 4/18/2024    Do monitor your blood pressure a few times each week for the next month. If consistently normal - then there is no concern, however, if your readings are above 140/90 then medication is warranted so do call in to the office    Medicare offers a range of preventive health benefits. Some of the tests and screenings are paid in full while other may be subject to a deductible, co-insurance, and/or copay.    Some of these benefits include a comprehensive review of your medical history including lifestyle, illnesses that may run in your family, and various assessments and screenings as appropriate.    After reviewing your medical record and screening and assessments performed today your provider may have ordered immunizations, labs, imaging, and/or referrals for you.  A list of these orders (if applicable) as well as your Preventive Care list are included within your After Visit Summary for your review.    Other Preventive Recommendations:    A preventive eye exam performed by an eye specialist is recommended every 1-2 years to screen for glaucoma; cataracts, macular degeneration, and other eye disorders.  A preventive dental visit is recommended every 6 months.  Try to get at least 150 minutes of exercise per week or 10,000 steps per day on a pedometer .  Order or download the FREE \"Exercise & Physical Activity: Your Everyday Guide\" from [employer] [place] [employer]. Call 1-474.498.4455 or search [employer] [place] [employer] online.  You need 2445-2920 mg of calcium and 7262-7763 IU of vitamin D per day. It is possible to meet your calcium requirement with diet alone, but a vitamin D supplement is usually necessary to meet this goal.  When exposed to the sun, use a sunscreen that protects against both UVA and UVB radiation with an SPF of 30 or greater. Reapply every 2 to 3 hours or after sweating, drying off with a towel, or swimming.  Always wear a

## 2024-04-19 LAB
T4 FREE SERPL-MCNC: 1.3 NG/DL (ref 0.9–1.8)
THYROPEROXIDASE AB SERPL IA-ACNC: 17 IU/ML
TSH SERPL DL<=0.005 MIU/L-ACNC: 1.01 UIU/ML (ref 0.27–4.2)

## 2024-04-19 RX ORDER — LEVOTHYROXINE SODIUM 0.05 MG/1
50 TABLET ORAL DAILY
Qty: 90 TABLET | Refills: 1 | Status: SHIPPED | OUTPATIENT
Start: 2024-04-19

## 2024-05-09 RX ORDER — LEVOTHYROXINE SODIUM 0.05 MG/1
50 TABLET ORAL DAILY
Qty: 90 TABLET | Refills: 1 | OUTPATIENT
Start: 2024-05-09

## 2024-05-09 NOTE — TELEPHONE ENCOUNTER
Levothyroxine request from Tricia - this was [facility] to [facility]. Called patient - she did receive this medication. No further action is needed for this encounter.

## 2024-07-02 ENCOUNTER — HOSPITAL ENCOUNTER (OUTPATIENT)
Dept: GENERAL RADIOLOGY | Age: 74
Discharge: HOME | End: 2024-07-02
Payer: MEDICARE

## 2024-07-02 ENCOUNTER — OFFICE VISIT (OUTPATIENT)
Dept: ENDOCRINOLOGY | Age: 74
End: 2024-07-02
Payer: MEDICARE

## 2024-07-02 VITALS — HEIGHT: 66 IN | BODY MASS INDEX: 29.57 KG/M2 | WEIGHT: 184 LBS

## 2024-07-02 DIAGNOSIS — Z51.81 MEDICATION MONITORING ENCOUNTER: ICD-10-CM

## 2024-07-02 DIAGNOSIS — M81.0 OSTEOPOROSIS, POSTMENOPAUSAL: ICD-10-CM

## 2024-07-02 DIAGNOSIS — M80.08XD VERTEBRAL FRACTURE, OSTEOPOROTIC, WITH ROUTINE HEALING, SUBSEQUENT ENCOUNTER: ICD-10-CM

## 2024-07-02 DIAGNOSIS — M81.0 OSTEOPOROSIS, POSTMENOPAUSAL: Primary | ICD-10-CM

## 2024-07-02 PROCEDURE — 99214 OFFICE O/P EST MOD 30 MIN: CPT | Performed by: INTERNAL MEDICINE

## 2024-07-02 PROCEDURE — G8427 DOCREV CUR MEDS BY ELIG CLIN: HCPCS | Performed by: INTERNAL MEDICINE

## 2024-07-02 PROCEDURE — 1036F TOBACCO NON-USER: CPT | Performed by: INTERNAL MEDICINE

## 2024-07-02 PROCEDURE — G8399 PT W/DXA RESULTS DOCUMENT: HCPCS | Performed by: INTERNAL MEDICINE

## 2024-07-02 PROCEDURE — 1090F PRES/ABSN URINE INCON ASSESS: CPT | Performed by: INTERNAL MEDICINE

## 2024-07-02 PROCEDURE — G2211 COMPLEX E/M VISIT ADD ON: HCPCS | Performed by: INTERNAL MEDICINE

## 2024-07-02 PROCEDURE — G9899 SCRN MAM PERF RSLTS DOC: HCPCS | Performed by: INTERNAL MEDICINE

## 2024-07-02 PROCEDURE — 3017F COLORECTAL CA SCREEN DOC REV: CPT | Performed by: INTERNAL MEDICINE

## 2024-07-02 PROCEDURE — 1123F ACP DISCUSS/DSCN MKR DOCD: CPT | Performed by: INTERNAL MEDICINE

## 2024-07-02 PROCEDURE — 77080 DXA BONE DENSITY AXIAL: CPT

## 2024-07-02 PROCEDURE — G8417 CALC BMI ABV UP PARAM F/U: HCPCS | Performed by: INTERNAL MEDICINE

## 2024-07-02 NOTE — PROGRESS NOTES
[facility]  [address] [place] [address] [address] [place] [address]  Phone [phone]  Fax [phone]    NAME:  HERRERA MOREIRA  :  1950  CONSULT DATE:    2022  MOST RECENT VISIT:  2023  TODAY'S DATE:  2024    Labs @ [facility] 2023    PROBLEMS.  Low bone density by DXA 2018, T-scores -1.3 in the spine, -0.5 in the right femoral neck    Family history of osteoporosis, mother    , age 60, T10 fx (fell)    2021 fx T10 (40%), T12 (40%); fell    3” height loss    BMD decreased [id number], T-scores -1.8 in the spine, -0.6 in the right femoral neck  Natural menopause age 57, estrogen x 2 years, stopped due to pulmonary emboli  Shoulder replacements right 2017, left 10/2018  Knee replacements right 2018, left 2020    CURRENT MANAGEMENT FOR BONE HEALTH/OSTEOPOROSIS.  Calcium, 300 mg from low calcium foods,  150 mg milk, 300 mg cheese, 200 mg dk green vegs   diet MVI Ca+D other total    Calcium 950 220 975   mg/d   Vitamin D  1000 3000 2000 with Mg  IU/d     25-OH D 75 ng/mL 2021 (desirable is 30-60 ng/mL)  Exercise, walks 45-60 min 3-4 x weekly, recumbent bike  Pharmacologic therapy: Fosamax 70 mg weekly started 2021    PREVIOUS BONE-ACTIVE MEDICATIONS. none    OTHER CURRENT MEDICATIONS (SELECTED): Wellbutrin, Protonix, Seroquel, Requip, Crestor  OTC MEDICATIONS (SELECTED): magnesium, probiotic, B complex    CHIEF COMPLAINT.  Here for f/u of osteoporosis, monitoring treatment.  No new related signs or symptoms.    INTERVAL HISTORY: See problem list for chronic/inactive conditions.  She has been taking alendronate correctly and without side effects. No falls, near-falls or fractures. Feels well overall.    FOR FULL DETAILS OF FAMILY HISTORY, PAST MEDICAL AND SURGICAL HISTORY, SOCIAL HISTORY, SEE PATIENT QUESTIONNAIRE OF TODAY'S DATE.    PHYSICAL EXAMINATION.   GENERAL.  Well-nourished, well-developed, normally proportioned adult.

## 2024-08-15 ENCOUNTER — OFFICE VISIT (OUTPATIENT)
Dept: PRIMARY CARE CLINIC | Age: 74
End: 2024-08-15

## 2024-08-15 VITALS
DIASTOLIC BLOOD PRESSURE: 78 MMHG | RESPIRATION RATE: 16 BRPM | WEIGHT: 183.6 LBS | SYSTOLIC BLOOD PRESSURE: 118 MMHG | BODY MASS INDEX: 29.63 KG/M2 | OXYGEN SATURATION: 98 % | TEMPERATURE: 97.4 F | HEART RATE: 76 BPM

## 2024-08-15 DIAGNOSIS — R10.13 EPIGASTRIC ABDOMINAL PAIN: Primary | ICD-10-CM

## 2024-08-15 RX ORDER — SUCRALFATE 1 G/1
1 TABLET ORAL 4 TIMES DAILY
Qty: 40 TABLET | Refills: 0 | Status: SHIPPED | OUTPATIENT
Start: 2024-08-15

## 2024-08-15 NOTE — PROGRESS NOTES
No scleral icterus.  Cardiovascular:      Rate and Rhythm: Normal rate and regular rhythm.      Heart sounds: Normal heart sounds.   Pulmonary:      Breath sounds: Normal breath sounds.   Abdominal:      Tenderness: There is abdominal tenderness. There is no guarding or rebound.      Comments: + Tenderness in epigastric and upper quadrants    Neurological:      General: No focal deficit present.      Mental Status: She is alert and oriented to person, place, and time.   Psychiatric:         Attention and Perception: Attention and perception normal.         Mood and Affect: Mood and affect normal.         Speech: Speech normal.         Behavior: Behavior normal. Behavior is cooperative.         Thought Content: Thought content normal.         Cognition and Memory: Cognition and memory normal.         Judgment: Judgment normal.         1 Undiagnosed new problem with uncertain prognosis     Electronically signed by KARINE TAYLOR MD on 8/15/2024 at 7:35 PM.    Please note this chart was generated using dragon dictation software.  Although every effort was made to ensure the accuracy of this automated transcription, some errors in transcription may have occurred.

## 2024-08-16 LAB
ALBUMIN SERPL-MCNC: 4.6 G/DL (ref 3.4–5)
ALBUMIN/GLOB SERPL: 1.7 {RATIO} (ref 1.1–2.2)
ALP SERPL-CCNC: 61 U/L (ref 40–129)
ALT SERPL-CCNC: 16 U/L (ref 10–40)
ANION GAP SERPL CALCULATED.3IONS-SCNC: 11 MMOL/L (ref 3–16)
AST SERPL-CCNC: 21 U/L (ref 15–37)
BASOPHILS # BLD: 0 K/UL (ref 0–0.2)
BASOPHILS NFR BLD: 0.7 %
BILIRUB SERPL-MCNC: 0.3 MG/DL (ref 0–1)
BUN SERPL-MCNC: 12 MG/DL (ref 7–20)
CALCIUM SERPL-MCNC: 10 MG/DL (ref 8.3–10.6)
CHLORIDE SERPL-SCNC: 97 MMOL/L (ref 99–110)
CO2 SERPL-SCNC: 26 MMOL/L (ref 21–32)
CREAT SERPL-MCNC: 0.7 MG/DL (ref 0.6–1.2)
DEPRECATED RDW RBC AUTO: 13.6 % (ref 12.4–15.4)
EOSINOPHIL # BLD: 0.1 K/UL (ref 0–0.6)
EOSINOPHIL NFR BLD: 2.4 %
GFR SERPLBLD CREATININE-BSD FMLA CKD-EPI: >90 ML/MIN/{1.73_M2}
GLUCOSE SERPL-MCNC: 99 MG/DL (ref 70–99)
HCT VFR BLD AUTO: 38 % (ref 36–48)
HGB BLD-MCNC: 12.8 G/DL (ref 12–16)
LIPASE SERPL-CCNC: 19 U/L (ref 13–60)
LYMPHOCYTES # BLD: 1.4 K/UL (ref 1–5.1)
LYMPHOCYTES NFR BLD: 33.5 %
MCH RBC QN AUTO: 31.3 PG (ref 26–34)
MCHC RBC AUTO-ENTMCNC: 33.6 G/DL (ref 31–36)
MCV RBC AUTO: 93.2 FL (ref 80–100)
MONOCYTES # BLD: 0.4 K/UL (ref 0–1.3)
MONOCYTES NFR BLD: 10.1 %
NEUTROPHILS # BLD: 2.3 K/UL (ref 1.7–7.7)
NEUTROPHILS NFR BLD: 53.3 %
PLATELET # BLD AUTO: 198 K/UL (ref 135–450)
PMV BLD AUTO: 8.4 FL (ref 5–10.5)
POTASSIUM SERPL-SCNC: 5.2 MMOL/L (ref 3.5–5.1)
PROT SERPL-MCNC: 7.3 G/DL (ref 6.4–8.2)
RBC # BLD AUTO: 4.08 M/UL (ref 4–5.2)
SODIUM SERPL-SCNC: 134 MMOL/L (ref 136–145)
WBC # BLD AUTO: 4.3 K/UL (ref 4–11)

## 2024-08-20 ENCOUNTER — HOSPITAL ENCOUNTER (OUTPATIENT)
Age: 74
Discharge: HOME | End: 2024-08-20
Payer: MEDICARE

## 2024-08-20 DIAGNOSIS — R10.13 EPIGASTRIC ABDOMINAL PAIN: ICD-10-CM

## 2024-08-20 PROCEDURE — 6360000004 HC RX CONTRAST MEDICATION: Performed by: FAMILY MEDICINE

## 2024-08-20 PROCEDURE — 74160 CT ABDOMEN W/CONTRAST: CPT

## 2024-08-20 RX ADMIN — DIATRIZOATE MEGLUMINE AND DIATRIZOATE SODIUM 30 ML: 660; 100 LIQUID ORAL; RECTAL at 09:52

## 2024-08-20 RX ADMIN — IOPAMIDOL 75 ML: 755 INJECTION, SOLUTION INTRAVENOUS at 09:53

## 2024-08-29 ENCOUNTER — OFFICE VISIT (OUTPATIENT)
Dept: PRIMARY CARE CLINIC | Age: 74
End: 2024-08-29

## 2024-08-29 VITALS
SYSTOLIC BLOOD PRESSURE: 138 MMHG | TEMPERATURE: 97.3 F | DIASTOLIC BLOOD PRESSURE: 62 MMHG | HEART RATE: 96 BPM | WEIGHT: 186 LBS | RESPIRATION RATE: 12 BRPM | OXYGEN SATURATION: 99 % | BODY MASS INDEX: 30.02 KG/M2

## 2024-08-29 DIAGNOSIS — G43.109 MIGRAINE WITH AURA AND WITHOUT STATUS MIGRAINOSUS, NOT INTRACTABLE: ICD-10-CM

## 2024-08-29 DIAGNOSIS — G25.81 RESTLESS LEG SYNDROME: ICD-10-CM

## 2024-08-29 DIAGNOSIS — R10.13 DYSPEPSIA: ICD-10-CM

## 2024-08-29 DIAGNOSIS — F33.41 RECURRENT MAJOR DEPRESSIVE DISORDER, IN PARTIAL REMISSION (HCC): ICD-10-CM

## 2024-08-29 DIAGNOSIS — E78.00 PURE HYPERCHOLESTEROLEMIA: Chronic | ICD-10-CM

## 2024-08-29 DIAGNOSIS — F51.01 PRIMARY INSOMNIA: Chronic | ICD-10-CM

## 2024-08-29 RX ORDER — ROSUVASTATIN CALCIUM 5 MG/1
5 TABLET, COATED ORAL NIGHTLY
Qty: 90 TABLET | Refills: 1 | Status: SHIPPED | OUTPATIENT
Start: 2024-08-29

## 2024-08-29 RX ORDER — RIZATRIPTAN BENZOATE 10 MG/1
10 TABLET ORAL
Qty: 30 TABLET | Refills: 1 | Status: SHIPPED | OUTPATIENT
Start: 2024-08-29 | End: 2024-08-29

## 2024-08-29 RX ORDER — QUETIAPINE FUMARATE 25 MG/1
25 TABLET, FILM COATED ORAL DAILY
Qty: 90 TABLET | Refills: 1 | Status: SHIPPED | OUTPATIENT
Start: 2024-08-29

## 2024-08-29 RX ORDER — LEVOTHYROXINE SODIUM 50 UG/1
50 TABLET ORAL DAILY
Qty: 90 TABLET | Refills: 1 | Status: SHIPPED | OUTPATIENT
Start: 2024-08-29

## 2024-08-29 RX ORDER — BUPROPION HYDROCHLORIDE 300 MG/1
300 TABLET ORAL EVERY MORNING
Qty: 90 TABLET | Refills: 1 | Status: SHIPPED | OUTPATIENT
Start: 2024-08-29

## 2024-08-29 RX ORDER — PANTOPRAZOLE SODIUM 40 MG/1
40 TABLET, DELAYED RELEASE ORAL DAILY
Qty: 90 TABLET | Refills: 1 | Status: SHIPPED | OUTPATIENT
Start: 2024-08-29

## 2024-08-29 RX ORDER — ROPINIROLE 1 MG/1
TABLET, FILM COATED ORAL
Qty: 360 TABLET | Refills: 1 | Status: SHIPPED | OUTPATIENT
Start: 2024-08-29

## 2024-08-29 NOTE — PROGRESS NOTES
PROGRESS NOTE  Date of Service:  8/29/2024    SUBJECTIVE:  Patient ID: Mechelle Fleming is a 74 y.o. female    ASSESSMENT  1. Recurrent major depressive disorder, in partial remission (HCC)    2. Pure hypercholesterolemia    3. Restless leg syndrome    4. Primary insomnia    5. Migraine with aura and without status migrainosus, not intractable    6. Dyspepsia        PLAN:   1. Recurrent major depressive disorder, in partial remission (HCC)  -     buPROPion (WELLBUTRIN XL) 300 MG extended release tablet; Take 1 tablet by mouth every morning, Disp-90 tablet, R-1Normal  2. Pure hypercholesterolemia  -     rosuvastatin (CRESTOR) 5 MG tablet; Take 1 tablet by mouth nightly, Disp-90 tablet, R-1Normal  3. Restless leg syndrome  -     rOPINIRole (REQUIP) 1 MG tablet; TAKE 1 TABLET four TIMES DAILY, Disp-360 tablet, R-1Normal  -     QUEtiapine (SEROQUEL) 25 MG tablet; Take 1 tablet by mouth daily, Disp-90 tablet, R-1Normal  4. Primary insomnia  -     QUEtiapine (SEROQUEL) 25 MG tablet; Take 1 tablet by mouth daily, Disp-90 tablet, R-1Normal  5. Migraine with aura and without status migrainosus, not intractable  -     rizatriptan (MAXALT) 10 MG tablet; Take 1 tablet by mouth once as needed for Migraine, Disp-30 tablet, R-1Normal  6. Dyspepsia  -     pantoprazole (PROTONIX) 40 MG tablet; Take 1 tablet by mouth daily, Disp-90 tablet, R-1Normal  Depression restless legs and insomnia are overall stable on current medications and will continue this regimen  Cholesterol has been well-controlled continue Crestor  Abdominal pain continues has had EGD will follow-up with GI for neck steps in the evaluation of continued pain  To continue Protonix at this time  Return in about 6 months (around 2/28/2025) for medicare annual wellness.          HPI:     Had egd - await bx results but no signif findings. Still with pain    IFG- denies increased thirst and urination. Following low carb diet, diff to lose weight      Hyperlipidemia- on

## 2024-09-03 RX ORDER — LEVOTHYROXINE SODIUM 50 UG/1
50 TABLET ORAL DAILY
Qty: 90 TABLET | Refills: 1 | OUTPATIENT
Start: 2024-09-03

## 2025-01-19 DIAGNOSIS — M81.0 OSTEOPOROSIS, POSTMENOPAUSAL: ICD-10-CM

## 2025-01-20 RX ORDER — ALENDRONATE SODIUM 70 MG/1
70 TABLET ORAL
Qty: 12 TABLET | Refills: 1 | Status: SHIPPED | OUTPATIENT
Start: 2025-01-20

## 2025-01-30 ENCOUNTER — PATIENT MESSAGE (OUTPATIENT)
Dept: PRIMARY CARE CLINIC | Age: 75
End: 2025-01-30

## 2025-01-31 ENCOUNTER — TELEMEDICINE (OUTPATIENT)
Dept: PRIMARY CARE CLINIC | Age: 75
End: 2025-01-31

## 2025-01-31 ENCOUNTER — TELEPHONE (OUTPATIENT)
Dept: PRIMARY CARE CLINIC | Age: 75
End: 2025-01-31

## 2025-01-31 DIAGNOSIS — K86.9 PANCREATIC LESION: Primary | ICD-10-CM

## 2025-01-31 RX ORDER — DICYCLOMINE HCL 20 MG
TABLET ORAL 4 TIMES DAILY PRN
COMMUNITY
Start: 2025-01-29 | End: 2025-02-03 | Stop reason: SDUPTHER

## 2025-01-31 SDOH — ECONOMIC STABILITY: FOOD INSECURITY: WITHIN THE PAST 12 MONTHS, YOU WORRIED THAT YOUR FOOD WOULD RUN OUT BEFORE YOU GOT MONEY TO BUY MORE.: NEVER TRUE

## 2025-01-31 SDOH — ECONOMIC STABILITY: FOOD INSECURITY: WITHIN THE PAST 12 MONTHS, THE FOOD YOU BOUGHT JUST DIDN'T LAST AND YOU DIDN'T HAVE MONEY TO GET MORE.: NEVER TRUE

## 2025-01-31 ASSESSMENT — PATIENT HEALTH QUESTIONNAIRE - PHQ9
3. TROUBLE FALLING OR STAYING ASLEEP: NEARLY EVERY DAY
2. FEELING DOWN, DEPRESSED OR HOPELESS: SEVERAL DAYS
7. TROUBLE CONCENTRATING ON THINGS, SUCH AS READING THE NEWSPAPER OR WATCHING TELEVISION: NOT AT ALL
6. FEELING BAD ABOUT YOURSELF - OR THAT YOU ARE A FAILURE OR HAVE LET YOURSELF OR YOUR FAMILY DOWN: NOT AT ALL
SUM OF ALL RESPONSES TO PHQ QUESTIONS 1-9: 4
SUM OF ALL RESPONSES TO PHQ QUESTIONS 1-9: 4
9. THOUGHTS THAT YOU WOULD BE BETTER OFF DEAD, OR OF HURTING YOURSELF: NOT AT ALL
8. MOVING OR SPEAKING SO SLOWLY THAT OTHER PEOPLE COULD HAVE NOTICED. OR THE OPPOSITE, BEING SO FIGETY OR RESTLESS THAT YOU HAVE BEEN MOVING AROUND A LOT MORE THAN USUAL: NOT AT ALL
SUM OF ALL RESPONSES TO PHQ QUESTIONS 1-9: 4
10. IF YOU CHECKED OFF ANY PROBLEMS, HOW DIFFICULT HAVE THESE PROBLEMS MADE IT FOR YOU TO DO YOUR WORK, TAKE CARE OF THINGS AT HOME, OR GET ALONG WITH OTHER PEOPLE: SOMEWHAT DIFFICULT
SUM OF ALL RESPONSES TO PHQ QUESTIONS 1-9: 4
5. POOR APPETITE OR OVEREATING: NOT AT ALL
1. LITTLE INTEREST OR PLEASURE IN DOING THINGS: NOT AT ALL
4. FEELING TIRED OR HAVING LITTLE ENERGY: NOT AT ALL
SUM OF ALL RESPONSES TO PHQ9 QUESTIONS 1 & 2: 1

## 2025-01-31 NOTE — PROGRESS NOTES
Mechelle Fleming (:  1950) is a 74 y.o. female,Established patient, here for evaluation of the following chief complaint(s): Follow-up      ASSESSMENT/PLAN:  1. Pancreatic lesion  -     MRI ABDOMEN W WO CONTRAST; Future  Discussed next steps in imaging with an MRI in order placed.  With her symptoms and with slightly elevated lipase still consider pancreatitis as a possible cause and do recommend that she go on a clear liquid diet over the weekend and assess for improvement and let me know  Discussed that if symptoms were to worsen she should go to the emergency department again for repeat evaluation    Return in about 3 weeks (around 2025) for 6 month  appointment.    SUBJECTIVE/OBJECTIVE:  The patient was in normal state of health until 27 when she had onset of upper abdominal lower chest pain.  She describes this is a very tight bandlike sensation which was extremely painful  Reviewed ed record  Reviewed ct abdominal  Lipase at 117  She was given Bentyl to use as needed.  Was not told to be on a clear liquid diet but does find that eating can exacerbate her symptoms  No blood in her stool no black and tarry stool  Slight nausea but no emesis  No fevers or chills    [INSTRUCTIONS:  \"[x]\" Indicates a positive item  \"[]\" Indicates a negative item  -- DELETE ALL ITEMS NOT EXAMINED]    Constitutional: [x] Appears well-developed and well-nourished [x] No apparent distress      [] Abnormal -     Mental status: [x] Alert and awake  [x] Oriented to person/place/time [x] Able to follow commands    [] Abnormal -     Eyes:   EOM    [x]  Normal    [] Abnormal -   Sclera  [x]  Normal    [] Abnormal -          Discharge [x]  None visible   [] Abnormal -     HENT: [x] Normocephalic, atraumatic  [] Abnormal -   [x] Mouth/Throat: Mucous membranes are moist    External Ears [x] Normal  [] Abnormal -    Neck: [x] No visualized mass [] Abnormal -     Pulmonary/Chest: [x] Respiratory effort normal   [x] No visualized

## 2025-01-31 NOTE — TELEPHONE ENCOUNTER
[facility] scheduling calling into office stating the order for the MRI abdomen needs to be with and without contrast. Please advise.

## 2025-02-03 RX ORDER — DICYCLOMINE HCL 20 MG
20 TABLET ORAL 4 TIMES DAILY PRN
Qty: 40 TABLET | Refills: 0 | Status: SHIPPED | OUTPATIENT
Start: 2025-02-03

## 2025-02-03 NOTE — TELEPHONE ENCOUNTER
Please let her know I Sent in a refill but  this is not a long term treatment. Try not to use 4 times a day and only if symptoms develop. If symptoms persist further evaluation is needed

## 2025-02-03 NOTE — TELEPHONE ENCOUNTER
Medication:   Requested Prescriptions     Pending Prescriptions Disp Refills    dicyclomine (BENTYL) 20 MG tablet 120 tablet 0     Sig: Take 1 tablet by mouth 4 times daily as needed        Last Filled:  [id number]     Patient Phone Number: [phone] (home)     Last appt: 1/31/2025   Next appt: Visit date not found    Last OARRS:        No data to display

## 2025-02-04 ENCOUNTER — HOSPITAL ENCOUNTER (OUTPATIENT)
Dept: MRI IMAGING | Age: 75
Discharge: HOME | End: 2025-02-04
Payer: MEDICARE

## 2025-02-04 DIAGNOSIS — K86.9 PANCREATIC LESION: ICD-10-CM

## 2025-02-04 PROCEDURE — 2500000003 HC RX 250 WO HCPCS: Performed by: FAMILY MEDICINE

## 2025-02-04 PROCEDURE — A9579 GAD-BASE MR CONTRAST NOS,1ML: HCPCS | Performed by: FAMILY MEDICINE

## 2025-02-04 PROCEDURE — 74183 MRI ABD W/O CNTR FLWD CNTR: CPT

## 2025-02-04 PROCEDURE — 6360000004 HC RX CONTRAST MEDICATION: Performed by: FAMILY MEDICINE

## 2025-02-04 RX ORDER — 0.9 % SODIUM CHLORIDE 0.9 %
500 INTRAVENOUS SOLUTION INTRAVENOUS ONCE
Status: DISCONTINUED | OUTPATIENT
Start: 2025-02-04 | End: 2025-02-05 | Stop reason: HOSPADM

## 2025-02-04 RX ORDER — SODIUM CHLORIDE 0.9 % (FLUSH) 0.9 %
10 SYRINGE (ML) INJECTION PRN
Status: DISCONTINUED | OUTPATIENT
Start: 2025-02-04 | End: 2025-02-05 | Stop reason: HOSPADM

## 2025-02-04 RX ADMIN — GADOTERIDOL 17 ML: 279.3 INJECTION, SOLUTION INTRAVENOUS at 10:31

## 2025-02-04 RX ADMIN — SODIUM CHLORIDE, PRESERVATIVE FREE 10 ML: 5 INJECTION INTRAVENOUS at 09:53

## 2025-02-06 ENCOUNTER — ANESTHESIA EVENT (OUTPATIENT)
Dept: ENDOSCOPY | Age: 75
End: 2025-02-06
Payer: MEDICARE

## 2025-02-06 NOTE — OP NOTE
Endoscopy Note    Patient: Mechelle Fleming   : 1950  Acct#:     Procedure: Endoscopic ultrasound with FNA  Doppler of mesenteric vessels  EGD    Surgeon:  Gumaro Rasmussen MD    Referring Physician:  Dr Lee    Anesthesia:  MAC per Anesthesia.    Indications: This is a 74 y.o. year old female who presents today with abnormal MRI pancreas and upper abdominal pain.  MRI showed fullness and ill-defined infiltration of the body of the pancreas containing small cystic foci and adenocarcinoma could not be ruled out.      Consent: Risks, benefits, and alternatives were explained and informed consent was obtained.      Monitoring:  Patient was monitored with continuous pulse oximetry, telemetry, and intermittent blood pressures.    Details of the Procedure:  The patient was then taken to the endoscopy suite. A time-out was performed. The patient and staff were in agreement as to the correct patient and procedure.  The above anesthesia was administered by the anesthesia department.  The patient was placed in the left lateral position.  The Olympus videoendoscope was placed in the patient's mouth and under direct visualization passed into the esophagus and advanced without difficulty to the 2nd portion of the duodenum.  Views were good, patient toleration was good.  Retroflexion was performed in the stomach.      Findings:  1. The esophagus appeared normal without evidence of Andrade's esophagus or reflux esophagitis.  2.  Normal stomach  3.  Normal duodenum.    Next, the curvilinear array echoendoscope was advanced without difficulty to the 2nd portion of the duodenum.  Endosonographic views were good, patient toleration was good.     Findings:   There was a 2.45 x 2.16 cm hypoechoic mass in the head of the pancreas that extended farther into the body of the pancreas.  It compressed the portal vein and the bile duct.  The bile duct was dilated to 10mm upstream from the mass.  It encased the splenic artery.

## 2025-02-06 NOTE — PROGRESS NOTES
[facility] PRE-OPERATIVE INSTRUCTIONS    Day of Procedure:     2/7           Arrival time:     10           Surgery time:1130    Take the following medications with a sip of water:  Follow your MD/Surgeons pre-procedure instructions regarding your medications     Do not eat or drink anything after 12:00 midnight prior to your surgery.  This includes water, chewing gum, mints and ice chips.   You may brush your teeth and gargle the morning of your surgery, but do not swallow the water.     Please see your family doctor/pediatrician for a history and physical and/or concerning medications.   Bring any test results/reports from your physicians office.   If you are under the care of a heart doctor or specialist doctor, please be aware that you may be asked to see them for clearance.    You may be asked to stop blood thinners such as Coumadin, Plavix, Fragmin, Lovenox, etc., or any anti-inflammatories such as:  Aspirin, Ibuprofen, Advil, Naproxen prior to your surgery.    We also ask that you stop any over the counter medications such as fish oil, vitamin E, glucosamine, garlic, Multivitamins, COQ 10, etc.    We ask that you do not smoke 24 hours prior to surgery.  We ask that you do not  drink any alcoholic beverages 24 hours prior to surgery     You must make arrangements for a responsible adult to take you home after your surgery.    For your safety, you will not be allowed to leave alone or drive yourself home.  Your surgery will be cancelled if you do not have a ride home.     Also for your safety, you must have someone stay with you the first 24 hours after your surgery.     A parent or legal guardian must accompany a child scheduled for surgery and plan to stay at the hospital until the child is discharged.    Please do not bring other children with you.    For your comfort, please wear simple loose fitting clothing to the hospital.  Please do not bring valuables.    Do not wear any make-up on face or

## 2025-02-07 ENCOUNTER — APPOINTMENT (OUTPATIENT)
Dept: ENDOSCOPY | Age: 75
End: 2025-02-07
Attending: INTERNAL MEDICINE
Payer: MEDICARE

## 2025-02-07 ENCOUNTER — ANESTHESIA (OUTPATIENT)
Dept: ENDOSCOPY | Age: 75
End: 2025-02-07
Payer: MEDICARE

## 2025-02-07 ENCOUNTER — HOSPITAL ENCOUNTER (OUTPATIENT)
Age: 75
Setting detail: OUTPATIENT SURGERY
Discharge: HOME | End: 2025-02-07
Attending: INTERNAL MEDICINE | Admitting: INTERNAL MEDICINE
Payer: MEDICARE

## 2025-02-07 VITALS
SYSTOLIC BLOOD PRESSURE: 158 MMHG | HEART RATE: 78 BPM | RESPIRATION RATE: 18 BRPM | TEMPERATURE: 97 F | BODY MASS INDEX: 29.86 KG/M2 | HEIGHT: 66 IN | DIASTOLIC BLOOD PRESSURE: 81 MMHG | OXYGEN SATURATION: 98 % | WEIGHT: 185.8 LBS

## 2025-02-07 PROCEDURE — 7100000001 HC PACU RECOVERY - ADDTL 15 MIN: Performed by: INTERNAL MEDICINE

## 2025-02-07 PROCEDURE — 88173 CYTOPATH EVAL FNA REPORT: CPT

## 2025-02-07 PROCEDURE — 2580000003 HC RX 258: Performed by: ANESTHESIOLOGY

## 2025-02-07 PROCEDURE — 2709999900 HC NON-CHARGEABLE SUPPLY: Performed by: INTERNAL MEDICINE

## 2025-02-07 PROCEDURE — 88172 CYTP DX EVAL FNA 1ST EA SITE: CPT

## 2025-02-07 PROCEDURE — 7100000011 HC PHASE II RECOVERY - ADDTL 15 MIN: Performed by: INTERNAL MEDICINE

## 2025-02-07 PROCEDURE — 88177 CYTP FNA EVAL EA ADDL: CPT

## 2025-02-07 PROCEDURE — 7100000000 HC PACU RECOVERY - FIRST 15 MIN: Performed by: INTERNAL MEDICINE

## 2025-02-07 PROCEDURE — 3609020800 HC EGD W/EUS FNA: Performed by: INTERNAL MEDICINE

## 2025-02-07 PROCEDURE — 2720000010 HC SURG SUPPLY STERILE: Performed by: INTERNAL MEDICINE

## 2025-02-07 PROCEDURE — 88305 TISSUE EXAM BY PATHOLOGIST: CPT

## 2025-02-07 PROCEDURE — 3700000001 HC ADD 15 MINUTES (ANESTHESIA): Performed by: INTERNAL MEDICINE

## 2025-02-07 PROCEDURE — 3700000000 HC ANESTHESIA ATTENDED CARE: Performed by: INTERNAL MEDICINE

## 2025-02-07 PROCEDURE — 7100000010 HC PHASE II RECOVERY - FIRST 15 MIN: Performed by: INTERNAL MEDICINE

## 2025-02-07 PROCEDURE — 6360000002 HC RX W HCPCS

## 2025-02-07 RX ORDER — SODIUM CHLORIDE 9 MG/ML
INJECTION, SOLUTION INTRAVENOUS PRN
Status: DISCONTINUED | OUTPATIENT
Start: 2025-02-07 | End: 2025-02-07 | Stop reason: HOSPADM

## 2025-02-07 RX ORDER — SODIUM CHLORIDE 0.9 % (FLUSH) 0.9 %
5-40 SYRINGE (ML) INJECTION PRN
Status: DISCONTINUED | OUTPATIENT
Start: 2025-02-07 | End: 2025-02-07 | Stop reason: HOSPADM

## 2025-02-07 RX ORDER — PROPOFOL 10 MG/ML
INJECTION, EMULSION INTRAVENOUS
Status: DISCONTINUED | OUTPATIENT
Start: 2025-02-07 | End: 2025-02-07 | Stop reason: SDUPTHER

## 2025-02-07 RX ORDER — ONDANSETRON 2 MG/ML
4 INJECTION INTRAMUSCULAR; INTRAVENOUS
Status: DISCONTINUED | OUTPATIENT
Start: 2025-02-07 | End: 2025-02-07 | Stop reason: HOSPADM

## 2025-02-07 RX ORDER — LIDOCAINE HYDROCHLORIDE 20 MG/ML
INJECTION, SOLUTION EPIDURAL; INFILTRATION; INTRACAUDAL; PERINEURAL
Status: DISCONTINUED | OUTPATIENT
Start: 2025-02-07 | End: 2025-02-07 | Stop reason: SDUPTHER

## 2025-02-07 RX ORDER — ONDANSETRON 2 MG/ML
INJECTION INTRAMUSCULAR; INTRAVENOUS
Status: DISCONTINUED | OUTPATIENT
Start: 2025-02-07 | End: 2025-02-07 | Stop reason: SDUPTHER

## 2025-02-07 RX ORDER — NALOXONE HYDROCHLORIDE 0.4 MG/ML
INJECTION, SOLUTION INTRAMUSCULAR; INTRAVENOUS; SUBCUTANEOUS PRN
Status: DISCONTINUED | OUTPATIENT
Start: 2025-02-07 | End: 2025-02-07 | Stop reason: HOSPADM

## 2025-02-07 RX ORDER — FENTANYL CITRATE 0.05 MG/ML
25 INJECTION, SOLUTION INTRAMUSCULAR; INTRAVENOUS EVERY 5 MIN PRN
Status: DISCONTINUED | OUTPATIENT
Start: 2025-02-07 | End: 2025-02-07 | Stop reason: HOSPADM

## 2025-02-07 RX ORDER — GLYCOPYRROLATE 0.2 MG/ML
INJECTION INTRAMUSCULAR; INTRAVENOUS
Status: DISCONTINUED | OUTPATIENT
Start: 2025-02-07 | End: 2025-02-07 | Stop reason: SDUPTHER

## 2025-02-07 RX ORDER — SODIUM CHLORIDE 0.9 % (FLUSH) 0.9 %
5-40 SYRINGE (ML) INJECTION EVERY 12 HOURS SCHEDULED
Status: DISCONTINUED | OUTPATIENT
Start: 2025-02-07 | End: 2025-02-07 | Stop reason: HOSPADM

## 2025-02-07 RX ADMIN — ONDANSETRON 4 MG: 2 INJECTION INTRAMUSCULAR; INTRAVENOUS at 12:38

## 2025-02-07 RX ADMIN — PROPOFOL 180 MCG/KG/MIN: 10 INJECTION, EMULSION INTRAVENOUS at 12:24

## 2025-02-07 RX ADMIN — PROPOFOL 70 MG: 10 INJECTION, EMULSION INTRAVENOUS at 12:23

## 2025-02-07 RX ADMIN — LIDOCAINE HYDROCHLORIDE 85 MG: 20 INJECTION, SOLUTION EPIDURAL; INFILTRATION; INTRACAUDAL; PERINEURAL at 12:23

## 2025-02-07 RX ADMIN — SODIUM CHLORIDE: 9 INJECTION, SOLUTION INTRAVENOUS at 12:19

## 2025-02-07 RX ADMIN — GLYCOPYRROLATE 0.2 MG: 0.2 INJECTION INTRAMUSCULAR; INTRAVENOUS at 12:20

## 2025-02-07 ASSESSMENT — PAIN - FUNCTIONAL ASSESSMENT
PAIN_FUNCTIONAL_ASSESSMENT: NONE - DENIES PAIN

## 2025-02-07 NOTE — PROGRESS NOTES
Procedure Performed: ENDOSCOPIC ULTRASOUND With: Fine Needle Aspiration    Fine Needle Aspiration of:   Pancreatic Head (X 4 Passes)        ALL FNA SPECIMENS MANAGED BY CYTOTECHNOLOGIST.       EUS scope balloon removed intact and verified by Cyndi Smith RN  and Kimberly Ocampo RN

## 2025-02-07 NOTE — ANESTHESIA POSTPROCEDURE EVALUATION
[facility] Department of Anesthesiology  Post-Anesthesia Note       Name:  Mechelle Fleming                                  Age:  74 y.o.  MRN:  [id number]     Last Vitals & Oxygen Saturation: BP (!) 158/81   Pulse 78   Temp 97 °F (36.1 °C) (Temporal)   Resp 18   Ht 1.676 m (5' 6\")   Wt 84.3 kg (185 lb 12.8 oz)   LMP  (LMP Unknown)   SpO2 98%   BMI 29.99 kg/m²   Patient Vitals for the past 4 hrs:   BP Temp Temp src Pulse Resp SpO2 Height Weight   02/07/25 1355 (!) 158/81 -- -- 78 18 98 % -- --   02/07/25 1332 (!) 146/72 97 °F (36.1 °C) Temporal 79 18 98 % -- --   02/07/25 1328 -- 97.8 °F (36.6 °C) Temporal 81 16 95 % -- --   02/07/25 1325 125/60 -- -- 81 16 95 % -- --   02/07/25 1322 113/72 -- -- 84 14 95 % -- --   02/07/25 1317 111/67 -- -- 86 16 94 % -- --   02/07/25 1308 (!) 95/51 98 °F (36.7 °C) Temporal 83 16 97 % -- --   02/07/25 1013 (!) 143/73 97.6 °F (36.4 °C) Temporal 66 16 97 % 1.676 m (5' 6\") 84.3 kg (185 lb 12.8 oz)       Level of consciousness:  Awake, alert    Respiratory: Respirations easy, no distress. Stable.    Cardiovascular: Hemodynamically stable.    Hydration: Adequate.    PONV: Adequately managed.    Post-op pain: Adequately controlled.    Post-op assessment: Tolerated anesthetic well without complication.    Complications:  None.    Tirso Lozano MD  February 7, 2025   2:01 PM

## 2025-02-07 NOTE — DISCHARGE INSTRUCTIONS
Impression:  Normal EGD.  Pancreatic mass (eC2N9Sp, stage 3).  Biopsies performed.    Recommendations:  1. Clear liquid diet advance as tolerated.    2.  Await biopsies.    3.  Oncology referral.  4.  Call on Tuesday if you have not heard from me about biopsy results.    J Carlos Rasmussen MD  [facility]      Endoscopy Discharge Instructions    Call with any questions or concerns.    You may be drowsy or lightheaded after receiving sedation.  DO NOT operate  a vehicle (automobile, bicycle, motorcycle, machinery, or power tools), no  alcoholic beverages, and do not make any important decisions today.                 Plan on bed rest or quiet relaxation today.  Resume normal activities in the morning.     Resume normal activity tomorrow unless otherwise advised by your physician.            Eat a light first meal, avoiding spicy and fatty foods, then resume normal diet unless  you are told otherwise by your physician.    If the intravenous medication site is painful, apply warm compresses on the site until the soreness is relieved and elevate the arm above the heart. Call your physician if no improvement  in 2-3 days.       POSSIBLE SYMPTOMS TO WATCH:     1. fever (greater than 100) 5. increased abdominal bloating   2. severe pain   6. excessive bleeding   3. nausea and vomiting  7. chest pain   4. chills    8. shortness of breath       Notify us if these problems occur     Expected as normal and remedies:  Sore throat: use over the counter throat lozenges or gargle with warm salt water.  Redness or soreness at the IV site: apply warm compress  Gaseous discomfort: belching or passing flatus (gas).

## 2025-02-07 NOTE — H&P
encounter.     Current Outpatient Medications on File Prior to Encounter   Medication Sig Dispense Refill    dicyclomine (BENTYL) 20 MG tablet Take 1 tablet by mouth 4 times daily as needed (pain) For 10 days 40 tablet 0    alendronate (FOSAMAX) 70 MG tablet TAKE 1 TABLET EVERY 7 DAYS 12 tablet 1    buPROPion (WELLBUTRIN XL) 300 MG extended release tablet Take 1 tablet by mouth every morning 90 tablet 1    rosuvastatin (CRESTOR) 5 MG tablet Take 1 tablet by mouth nightly 90 tablet 1    rOPINIRole (REQUIP) 1 MG tablet TAKE 1 TABLET four TIMES DAILY 360 tablet 1    QUEtiapine (SEROQUEL) 25 MG tablet Take 1 tablet by mouth daily 90 tablet 1    levothyroxine (SYNTHROID) 50 MCG tablet Take 1 tablet by mouth daily 90 tablet 1    pantoprazole (PROTONIX) 40 MG tablet Take 1 tablet by mouth daily 90 tablet 1    gabapentin (NEURONTIN) 300 MG capsule Take 1 capsule by mouth in the morning and 1 capsule in the evening.      betamethasone dipropionate 0.05 % ointment as needed      acetaminophen (TYLENOL) 500 MG tablet Take 2 tablets by mouth      Probiotic Product (PROBIOTIC DAILY PO) Take by mouth daily      Multiple Vitamin (MULTIVITAMIN ADULT PO) Take by mouth daily      rizatriptan (MAXALT) 10 MG tablet Take 1 tablet by mouth once as needed for Migraine 30 tablet 1        Allergies:  Ciprofloxacin and Sulfamethoxazole-trimethoprim    Social History:   Social History     Socioeconomic History    Marital status:      Spouse name: Not on file    Number of children: Not on file    Years of education: Not on file    Highest education level: Not on file   Occupational History    Not on file   Tobacco Use    Smoking status: Former     Current packs/day: 0.00     Average packs/day: 0.5 packs/day for 4.9 years (2.5 ttl pk-yrs)     Types: Cigarettes     Start date: 1974     Quit date: 1979     Years since quittin.1    Smokeless tobacco: Never   Substance and Sexual Activity    Alcohol use: Yes     Alcohol/week:

## 2025-02-07 NOTE — ANESTHESIA PRE PROCEDURE
of this encounter: 84.3 kg (185 lb 12.8 oz).    CBC   Lab Results   Component Value Date/Time    WBC 4.3 08/15/2024 02:09 PM    RBC 4.08 08/15/2024 02:09 PM    HGB 12.8 08/15/2024 02:09 PM    HCT 38.0 08/15/2024 02:09 PM    MCV 93.2 08/15/2024 02:09 PM    RDW 13.6 08/15/2024 02:09 PM     08/15/2024 02:09 PM     CMP    Lab Results   Component Value Date/Time     08/15/2024 02:09 PM    K 5.2 08/15/2024 02:09 PM    CL 97 08/15/2024 02:09 PM    CO2 26 08/15/2024 02:09 PM    BUN 12 08/15/2024 02:09 PM    CREATININE 0.7 08/15/2024 02:09 PM    GFRAA >60 09/06/2022 11:08 AM    AGRATIO 1.7 08/15/2024 02:09 PM    LABGLOM >90 08/15/2024 02:09 PM    LABGLOM >60 09/07/2023 10:57 AM    GLUCOSE 99 08/15/2024 02:09 PM    CALCIUM 10.0 08/15/2024 02:09 PM    BILITOT 0.3 08/15/2024 02:09 PM    ALKPHOS 61 08/15/2024 02:09 PM    AST 21 08/15/2024 02:09 PM    ALT 16 08/15/2024 02:09 PM     BMP    Lab Results   Component Value Date/Time     08/15/2024 02:09 PM    K 5.2 08/15/2024 02:09 PM    CL 97 08/15/2024 02:09 PM    CO2 26 08/15/2024 02:09 PM    BUN 12 08/15/2024 02:09 PM    CREATININE 0.7 08/15/2024 02:09 PM    CALCIUM 10.0 08/15/2024 02:09 PM    GFRAA >60 09/06/2022 11:08 AM    LABGLOM >90 08/15/2024 02:09 PM    LABGLOM >60 09/07/2023 10:57 AM    GLUCOSE 99 08/15/2024 02:09 PM     POCGlucose  No results for input(s): \"GLUCOSE\" in the last 72 hours.   Coags    Lab Results   Component Value Date/Time    PROTIME 11.2 03/10/2017 02:11 PM    INR 1.0 03/10/2017 02:11 PM    APTT 31.8 03/10/2017 02:11 PM     HCG (If Applicable) No results found for: \"PREGTESTUR\", \"PREGSERUM\", \"HCG\", \"HCGQUANT\"   ABGs No results found for: \"PHART\", \"PO2ART\", \"IGY1VGP\", \"RZJ8BBM\", \"BEART\", \"Q0YPIPAQ\"   Type & Screen (If Applicable)  Lab Results   Component Value Date    LABABO A 03/20/2017    LABABO A 03/20/2017                            BMI: Wt Readings from Last 3 Encounters:       NPO Status:   Date of last liquid consumption:

## 2025-02-11 ENCOUNTER — OFFICE VISIT (OUTPATIENT)
Dept: PRIMARY CARE CLINIC | Age: 75
End: 2025-02-11

## 2025-02-11 VITALS
BODY MASS INDEX: 31.05 KG/M2 | DIASTOLIC BLOOD PRESSURE: 88 MMHG | HEART RATE: 72 BPM | RESPIRATION RATE: 14 BRPM | SYSTOLIC BLOOD PRESSURE: 126 MMHG | TEMPERATURE: 97.5 F | WEIGHT: 192.4 LBS

## 2025-02-11 DIAGNOSIS — G25.81 RESTLESS LEG SYNDROME: ICD-10-CM

## 2025-02-11 DIAGNOSIS — C25.9 PRIMARY MALIGNANT NEOPLASM OF PANCREAS (HCC): Primary | ICD-10-CM

## 2025-02-11 DIAGNOSIS — Z23 NEED FOR VACCINATION: ICD-10-CM

## 2025-02-11 DIAGNOSIS — F33.41 RECURRENT MAJOR DEPRESSIVE DISORDER, IN PARTIAL REMISSION (HCC): ICD-10-CM

## 2025-02-11 DIAGNOSIS — R73.01 IMPAIRED FASTING BLOOD SUGAR: Chronic | ICD-10-CM

## 2025-02-11 DIAGNOSIS — L40.50 PSORIATIC ARTHRITIS (HCC): ICD-10-CM

## 2025-02-11 DIAGNOSIS — F51.01 PRIMARY INSOMNIA: ICD-10-CM

## 2025-02-11 DIAGNOSIS — E78.00 PURE HYPERCHOLESTEROLEMIA: Chronic | ICD-10-CM

## 2025-02-11 LAB — HBA1C MFR BLD: 6.4 %

## 2025-02-11 RX ORDER — ALPRAZOLAM 0.25 MG/1
0.25 TABLET ORAL 3 TIMES DAILY PRN
Qty: 60 TABLET | Refills: 0 | Status: SHIPPED | OUTPATIENT
Start: 2025-02-11 | End: 2025-03-11

## 2025-02-11 RX ORDER — DICYCLOMINE HCL 20 MG
20 TABLET ORAL 4 TIMES DAILY PRN
Qty: 120 TABLET | Refills: 1 | Status: SHIPPED | OUTPATIENT
Start: 2025-02-11

## 2025-02-11 NOTE — PROGRESS NOTES
PROGRESS NOTE  Date of Service:  2/11/2025    SUBJECTIVE:  Patient ID: Mechelle Fleming is a 74 y.o. female    ASSESSMENT  1. Primary malignant neoplasm of pancreas (HCC)    2. Primary insomnia    3. Impaired fasting blood sugar    4. Need for vaccination    5. Recurrent major depressive disorder, in partial remission (HCC)    6. Restless leg syndrome    7. Pure hypercholesterolemia    8. Psoriatic arthritis (HCC)        PLAN:   1. Primary malignant neoplasm of pancreas (HCC)  2. Primary insomnia  -     ALPRAZolam (XANAX) 0.25 MG tablet; Take 1 tablet by mouth 3 times daily as needed for Anxiety or Sleep for up to 60 doses. Max Daily Amount: 0.75 mg, Disp-60 tablet, R-0Normal  -     QUEtiapine (SEROQUEL) 25 MG tablet; Take 1 tablet by mouth daily, Disp-90 tablet, R-1Normal  3. Impaired fasting blood sugar  -     POCT glycosylated hemoglobin (Hb A1C)  4. Need for vaccination  -     Pneumococcal, PCV20, PREVNAR 20, (age 6w+), IM, PF  5. Recurrent major depressive disorder, in partial remission (HCC)  -     buPROPion (WELLBUTRIN XL) 300 MG extended release tablet; Take 1 tablet by mouth every morning, Disp-90 tablet, R-1Normal  6. Restless leg syndrome  -     rOPINIRole (REQUIP) 1 MG tablet; TAKE 1 TABLET four TIMES DAILY, Disp-360 tablet, R-1Normal  -     QUEtiapine (SEROQUEL) 25 MG tablet; Take 1 tablet by mouth daily, Disp-90 tablet, R-1Normal  7. Pure hypercholesterolemia  -     rosuvastatin (CRESTOR) 5 MG tablet; Take 1 tablet by mouth nightly, Disp-90 tablet, R-1Normal  8. Psoriatic arthritis (HCC)  Reviewed recent testing and results.  She will meet with oncology this week  Discussed second opinions are always welcome   Insomnia has worsened along with anxiety do recommend as needed use of alprazolam.  Continue other medications for underlying medical issues  Continue care per rheumatology and endocrinology    Return in about 4 weeks (around 3/11/2025).          HPI:   Pancreatic cancer-reviewed recent

## 2025-02-13 ENCOUNTER — TELEPHONE (OUTPATIENT)
Dept: SURGERY | Age: 75
End: 2025-02-13

## 2025-02-13 PROBLEM — M19.031 PRIMARY OSTEOARTHRITIS, RIGHT WRIST: Status: ACTIVE | Noted: 2022-07-05

## 2025-02-13 PROBLEM — C25.9 PRIMARY MALIGNANT NEOPLASM OF PANCREAS (HCC): Status: ACTIVE | Noted: 2025-02-07

## 2025-02-13 NOTE — PROGRESS NOTES
[facility]  [facility]  [place] [address]  Phone: [phone]  Fax: [phone]    Visit Date: 2/14/2025    Subjective:   Mechelle Fleming is a 74 y.o. female referred by Dr. Henry for evaluation and management of pancreatic cancer.     Her cancer was diagnosed on 2/7/25. She had a CT scan which showed a pancreatic mass, followed by an EUS guided biopsy that confirmed adenocarcinoma staging it at ([id number], stage 3) .     Patient says that she has been having intermittent abdominal discomfort since her June/July 2024.  CT abdomen and pelvis and EGD done at that time had not shown any abnormalities.    Due to ongoing symptoms she had presented to the hospital in late January 2025.  CT abdomen and pelvis done on 1/29/2025 had shown few hypodense lesions in the pancreas which were thought to be nonspecific. MRI of the abdomen was obtained on 2/4/2025 which showed fullness and ill-defined infiltration of the body of the pancreas containing small cystic foci.  Peripancreatic infiltration encasing the portal vein was also noted.    Patient was then evaluated by Dr. Rasmussen and EGD with EUS was performed on 2/7/2025. EUS showed 2.45 x 2.16 hypoechoic mass in the head of the pancreas that extended into the body of the pancreas. It compressed the portal vein and bile duct. It encases the splenic artery. Small lymph nodes were noted measuring up to 8 mm.    The cytology from the head of the pancreas mass was consistent with adenocarcinoma.     Today reports she is feeling generalized dull epigastric pain. It is exacerbated by eating.   Denies nausea, vomiting, and diarrhea. Endorses constipation, bloating, and decreased appetite. Denies weight change.     No other symptoms. No h/o blood in stools. No h/o chronic cough, or headache to indicate any other metastatic disease.     She is accompanied today by her . Her daughter also lives in [place].

## 2025-02-13 NOTE — TELEPHONE ENCOUNTER
MA called [facility] and had to leave a message asking them to please push patients imaging

## 2025-02-14 ENCOUNTER — OFFICE VISIT (OUTPATIENT)
Dept: SURGERY | Age: 75
End: 2025-02-14
Payer: MEDICARE

## 2025-02-14 ENCOUNTER — TELEPHONE (OUTPATIENT)
Dept: SURGERY | Age: 75
End: 2025-02-14

## 2025-02-14 ENCOUNTER — PREP FOR PROCEDURE (OUTPATIENT)
Dept: SURGERY | Age: 75
End: 2025-02-14

## 2025-02-14 VITALS
WEIGHT: 191.4 LBS | DIASTOLIC BLOOD PRESSURE: 68 MMHG | TEMPERATURE: 98 F | HEIGHT: 66 IN | SYSTOLIC BLOOD PRESSURE: 133 MMHG | HEART RATE: 71 BPM | BODY MASS INDEX: 30.76 KG/M2

## 2025-02-14 DIAGNOSIS — C25.9 PANCREATIC ADENOCARCINOMA (HCC): Primary | ICD-10-CM

## 2025-02-14 PROCEDURE — 3017F COLORECTAL CA SCREEN DOC REV: CPT | Performed by: SURGERY

## 2025-02-14 PROCEDURE — 1090F PRES/ABSN URINE INCON ASSESS: CPT | Performed by: SURGERY

## 2025-02-14 PROCEDURE — G9899 SCRN MAM PERF RSLTS DOC: HCPCS | Performed by: SURGERY

## 2025-02-14 PROCEDURE — 1123F ACP DISCUSS/DSCN MKR DOCD: CPT | Performed by: SURGERY

## 2025-02-14 PROCEDURE — G8417 CALC BMI ABV UP PARAM F/U: HCPCS | Performed by: SURGERY

## 2025-02-14 PROCEDURE — G8427 DOCREV CUR MEDS BY ELIG CLIN: HCPCS | Performed by: SURGERY

## 2025-02-14 PROCEDURE — 99205 OFFICE O/P NEW HI 60 MIN: CPT | Performed by: SURGERY

## 2025-02-14 PROCEDURE — 1036F TOBACCO NON-USER: CPT | Performed by: SURGERY

## 2025-02-14 PROCEDURE — G8399 PT W/DXA RESULTS DOCUMENT: HCPCS | Performed by: SURGERY

## 2025-02-14 NOTE — TELEPHONE ENCOUNTER
Pre-op Call     Pre-op phone call completed 2/14/2025 1:41 PM     Arrival date/time at [facility]: Wednesday February 19th. 12:30     Surgical site and laterality confirmed: Yes    [x] Reminded to be NPO after midnight    Pre-op H&P  [] To be completed with PCP  [] To be completed day of surgery   Dr. Munoz note 2/13/25 in media    Lymphoscintigram scheduled if needed:  NA    Pre-op labs completed (if needed):   No    Medication list reviewed: Not on any meds that need to be held pre-operatively   3 Day Hold 4 Day Hold 5 Day Hold 7 Day Hold   Eliquis  Bevyxxa  Savaysa  Xarelto  Invokana  Farxiga  Jardiance  Brenzavvy  Synjardy  Xigduo  Suboxone  Cialis Steglatro Aggrenox  Arixtra  Brillinta  Coumadin  Fragmin  Plavix  Pletal  Trental QSYMIA  Effient  Victoza  Trulicity  Ozempic  Mounjaro  Wegovy  Zepbound  Bydureon  Rybelsus           Bowel prep reviewed: Yes     Has ride home from hospital: Yes         Instructed to call with any questions or concerns. Verbalized understanding.

## 2025-02-14 NOTE — TELEPHONE ENCOUNTER
MA called patient to let patient know that we added to the scan she is having on 2/21/25  and she has to have nothing to eat or drink 4 hours prior and she said that was fine.       MA called central scheduling and ask them to make sure the CT Scan of the chest is without and the  said she changed it to without and also made a note of it.

## 2025-02-16 RX ORDER — LEVOTHYROXINE SODIUM 50 UG/1
50 TABLET ORAL DAILY
Qty: 90 TABLET | Refills: 1 | Status: SHIPPED | OUTPATIENT
Start: 2025-02-16

## 2025-02-16 RX ORDER — QUETIAPINE FUMARATE 25 MG/1
25 TABLET, FILM COATED ORAL DAILY
Qty: 90 TABLET | Refills: 1 | Status: SHIPPED | OUTPATIENT
Start: 2025-02-16

## 2025-02-16 RX ORDER — BUPROPION HYDROCHLORIDE 300 MG/1
300 TABLET ORAL EVERY MORNING
Qty: 90 TABLET | Refills: 1 | Status: SHIPPED | OUTPATIENT
Start: 2025-02-16

## 2025-02-16 RX ORDER — ROSUVASTATIN CALCIUM 5 MG/1
5 TABLET, COATED ORAL NIGHTLY
Qty: 90 TABLET | Refills: 1 | Status: SHIPPED | OUTPATIENT
Start: 2025-02-16

## 2025-02-16 RX ORDER — ROPINIROLE 1 MG/1
TABLET, FILM COATED ORAL
Qty: 360 TABLET | Refills: 1 | Status: SHIPPED | OUTPATIENT
Start: 2025-02-16

## 2025-02-19 ENCOUNTER — HOSPITAL ENCOUNTER (OUTPATIENT)
Age: 75
Setting detail: OUTPATIENT SURGERY
Discharge: HOME | End: 2025-02-19
Attending: SURGERY | Admitting: SURGERY
Payer: MEDICARE

## 2025-02-19 ENCOUNTER — ANESTHESIA EVENT (OUTPATIENT)
Dept: OPERATING ROOM | Age: 75
End: 2025-02-19
Payer: MEDICARE

## 2025-02-19 ENCOUNTER — APPOINTMENT (OUTPATIENT)
Dept: GENERAL RADIOLOGY | Age: 75
End: 2025-02-19
Attending: SURGERY
Payer: MEDICARE

## 2025-02-19 ENCOUNTER — ANESTHESIA (OUTPATIENT)
Dept: OPERATING ROOM | Age: 75
End: 2025-02-19
Payer: MEDICARE

## 2025-02-19 VITALS
WEIGHT: 187 LBS | DIASTOLIC BLOOD PRESSURE: 76 MMHG | RESPIRATION RATE: 14 BRPM | HEIGHT: 67 IN | BODY MASS INDEX: 29.35 KG/M2 | SYSTOLIC BLOOD PRESSURE: 152 MMHG | OXYGEN SATURATION: 96 % | HEART RATE: 78 BPM | TEMPERATURE: 97 F

## 2025-02-19 DIAGNOSIS — C25.9 PANCREATIC ADENOCARCINOMA (HCC): ICD-10-CM

## 2025-02-19 DIAGNOSIS — R10.13 DYSPEPSIA: ICD-10-CM

## 2025-02-19 PROCEDURE — 49320 DIAG LAPARO SEPARATE PROC: CPT | Performed by: SURGERY

## 2025-02-19 PROCEDURE — 2500000003 HC RX 250 WO HCPCS

## 2025-02-19 PROCEDURE — 6360000002 HC RX W HCPCS: Performed by: NURSE PRACTITIONER

## 2025-02-19 PROCEDURE — 71045 X-RAY EXAM CHEST 1 VIEW: CPT

## 2025-02-19 PROCEDURE — 2500000003 HC RX 250 WO HCPCS: Performed by: NURSE PRACTITIONER

## 2025-02-19 PROCEDURE — 36561 INSERT TUNNELED CV CATH: CPT | Performed by: SURGERY

## 2025-02-19 PROCEDURE — 3600000004 HC SURGERY LEVEL 4 BASE: Performed by: SURGERY

## 2025-02-19 PROCEDURE — 77001 FLUOROGUIDE FOR VEIN DEVICE: CPT | Performed by: SURGERY

## 2025-02-19 PROCEDURE — 2500000003 HC RX 250 WO HCPCS: Performed by: NURSE ANESTHETIST, CERTIFIED REGISTERED

## 2025-02-19 PROCEDURE — 7100000010 HC PHASE II RECOVERY - FIRST 15 MIN: Performed by: SURGERY

## 2025-02-19 PROCEDURE — 77001 FLUOROGUIDE FOR VEIN DEVICE: CPT

## 2025-02-19 PROCEDURE — C1788 PORT, INDWELLING, IMP: HCPCS | Performed by: SURGERY

## 2025-02-19 PROCEDURE — 7100000001 HC PACU RECOVERY - ADDTL 15 MIN: Performed by: SURGERY

## 2025-02-19 PROCEDURE — 2709999900 HC NON-CHARGEABLE SUPPLY: Performed by: SURGERY

## 2025-02-19 PROCEDURE — 3700000001 HC ADD 15 MINUTES (ANESTHESIA): Performed by: SURGERY

## 2025-02-19 PROCEDURE — 3700000000 HC ANESTHESIA ATTENDED CARE: Performed by: SURGERY

## 2025-02-19 PROCEDURE — 6360000002 HC RX W HCPCS

## 2025-02-19 PROCEDURE — 7100000000 HC PACU RECOVERY - FIRST 15 MIN: Performed by: SURGERY

## 2025-02-19 PROCEDURE — 7100000011 HC PHASE II RECOVERY - ADDTL 15 MIN: Performed by: SURGERY

## 2025-02-19 PROCEDURE — 2500000003 HC RX 250 WO HCPCS: Performed by: SURGERY

## 2025-02-19 PROCEDURE — 2580000003 HC RX 258: Performed by: ANESTHESIOLOGY

## 2025-02-19 PROCEDURE — 2580000003 HC RX 258

## 2025-02-19 PROCEDURE — 6360000002 HC RX W HCPCS: Performed by: SURGERY

## 2025-02-19 PROCEDURE — 36561 INSERT TUNNELED CV CATH: CPT

## 2025-02-19 PROCEDURE — 93005 ELECTROCARDIOGRAM TRACING: CPT | Performed by: NURSE PRACTITIONER

## 2025-02-19 PROCEDURE — 6360000002 HC RX W HCPCS: Performed by: ANESTHESIOLOGY

## 2025-02-19 PROCEDURE — 3600000014 HC SURGERY LEVEL 4 ADDTL 15MIN: Performed by: SURGERY

## 2025-02-19 DEVICE — PORT INFUS SGL LUMN ATTCH POLYUR OPN END CATH 8FR POWERPRT: Type: IMPLANTABLE DEVICE | Status: FUNCTIONAL

## 2025-02-19 RX ORDER — LIDOCAINE HYDROCHLORIDE 20 MG/ML
INJECTION, SOLUTION INTRAVENOUS
Status: DISCONTINUED | OUTPATIENT
Start: 2025-02-19 | End: 2025-02-19 | Stop reason: SDUPTHER

## 2025-02-19 RX ORDER — ROCURONIUM BROMIDE 10 MG/ML
INJECTION, SOLUTION INTRAVENOUS
Status: DISCONTINUED | OUTPATIENT
Start: 2025-02-19 | End: 2025-02-19 | Stop reason: SDUPTHER

## 2025-02-19 RX ORDER — PROPOFOL 10 MG/ML
INJECTION, EMULSION INTRAVENOUS
Status: DISCONTINUED | OUTPATIENT
Start: 2025-02-19 | End: 2025-02-19 | Stop reason: SDUPTHER

## 2025-02-19 RX ORDER — HYDRALAZINE HYDROCHLORIDE 20 MG/ML
10 INJECTION INTRAMUSCULAR; INTRAVENOUS
Status: DISCONTINUED | OUTPATIENT
Start: 2025-02-19 | End: 2025-02-19 | Stop reason: HOSPADM

## 2025-02-19 RX ORDER — MAGNESIUM HYDROXIDE 1200 MG/15ML
LIQUID ORAL CONTINUOUS PRN
Status: DISCONTINUED | OUTPATIENT
Start: 2025-02-19 | End: 2025-02-19 | Stop reason: HOSPADM

## 2025-02-19 RX ORDER — APREPITANT 40 MG/1
40 CAPSULE ORAL ONCE
Status: COMPLETED | OUTPATIENT
Start: 2025-02-19 | End: 2025-02-19

## 2025-02-19 RX ORDER — FENTANYL CITRATE 50 UG/ML
INJECTION, SOLUTION INTRAMUSCULAR; INTRAVENOUS
Status: DISCONTINUED | OUTPATIENT
Start: 2025-02-19 | End: 2025-02-19 | Stop reason: SDUPTHER

## 2025-02-19 RX ORDER — FENTANYL CITRATE 50 UG/ML
25 INJECTION, SOLUTION INTRAMUSCULAR; INTRAVENOUS EVERY 5 MIN PRN
Status: DISCONTINUED | OUTPATIENT
Start: 2025-02-19 | End: 2025-02-19 | Stop reason: HOSPADM

## 2025-02-19 RX ORDER — OXYCODONE HYDROCHLORIDE 5 MG/1
5 TABLET ORAL PRN
Status: DISCONTINUED | OUTPATIENT
Start: 2025-02-19 | End: 2025-02-19 | Stop reason: HOSPADM

## 2025-02-19 RX ORDER — BUPIVACAINE HYDROCHLORIDE 5 MG/ML
INJECTION, SOLUTION EPIDURAL; INTRACAUDAL PRN
Status: DISCONTINUED | OUTPATIENT
Start: 2025-02-19 | End: 2025-02-19 | Stop reason: HOSPADM

## 2025-02-19 RX ORDER — HEPARIN 100 UNIT/ML
SYRINGE INTRAVENOUS PRN
Status: DISCONTINUED | OUTPATIENT
Start: 2025-02-19 | End: 2025-02-19 | Stop reason: HOSPADM

## 2025-02-19 RX ORDER — SODIUM CHLORIDE 0.9 % (FLUSH) 0.9 %
5-40 SYRINGE (ML) INJECTION PRN
Status: DISCONTINUED | OUTPATIENT
Start: 2025-02-19 | End: 2025-02-19 | Stop reason: HOSPADM

## 2025-02-19 RX ORDER — SODIUM CHLORIDE 9 MG/ML
INJECTION, SOLUTION INTRAVENOUS PRN
Status: DISCONTINUED | OUTPATIENT
Start: 2025-02-19 | End: 2025-02-19 | Stop reason: HOSPADM

## 2025-02-19 RX ORDER — PROCHLORPERAZINE EDISYLATE 5 MG/ML
5 INJECTION INTRAMUSCULAR; INTRAVENOUS
Status: DISCONTINUED | OUTPATIENT
Start: 2025-02-19 | End: 2025-02-19 | Stop reason: HOSPADM

## 2025-02-19 RX ORDER — ONDANSETRON 2 MG/ML
4 INJECTION INTRAMUSCULAR; INTRAVENOUS
Status: DISCONTINUED | OUTPATIENT
Start: 2025-02-19 | End: 2025-02-19 | Stop reason: HOSPADM

## 2025-02-19 RX ORDER — NALOXONE HYDROCHLORIDE 0.4 MG/ML
INJECTION, SOLUTION INTRAMUSCULAR; INTRAVENOUS; SUBCUTANEOUS PRN
Status: DISCONTINUED | OUTPATIENT
Start: 2025-02-19 | End: 2025-02-19 | Stop reason: HOSPADM

## 2025-02-19 RX ORDER — SODIUM CHLORIDE 0.9 % (FLUSH) 0.9 %
5-40 SYRINGE (ML) INJECTION EVERY 12 HOURS SCHEDULED
Status: DISCONTINUED | OUTPATIENT
Start: 2025-02-19 | End: 2025-02-19 | Stop reason: HOSPADM

## 2025-02-19 RX ORDER — PHENYLEPHRINE HYDROCHLORIDE 10 MG/ML
INJECTION INTRAVENOUS
Status: DISCONTINUED | OUTPATIENT
Start: 2025-02-19 | End: 2025-02-19 | Stop reason: SDUPTHER

## 2025-02-19 RX ORDER — SODIUM CHLORIDE, SODIUM LACTATE, POTASSIUM CHLORIDE, CALCIUM CHLORIDE 600; 310; 30; 20 MG/100ML; MG/100ML; MG/100ML; MG/100ML
INJECTION, SOLUTION INTRAVENOUS CONTINUOUS
Status: DISCONTINUED | OUTPATIENT
Start: 2025-02-19 | End: 2025-02-19 | Stop reason: HOSPADM

## 2025-02-19 RX ORDER — PANTOPRAZOLE SODIUM 40 MG/1
40 TABLET, DELAYED RELEASE ORAL DAILY
Qty: 90 TABLET | Refills: 1 | Status: SHIPPED | OUTPATIENT
Start: 2025-02-19

## 2025-02-19 RX ORDER — LABETALOL HYDROCHLORIDE 5 MG/ML
10 INJECTION, SOLUTION INTRAVENOUS
Status: DISCONTINUED | OUTPATIENT
Start: 2025-02-19 | End: 2025-02-19 | Stop reason: HOSPADM

## 2025-02-19 RX ORDER — ONDANSETRON 2 MG/ML
INJECTION INTRAMUSCULAR; INTRAVENOUS
Status: DISCONTINUED | OUTPATIENT
Start: 2025-02-19 | End: 2025-02-19 | Stop reason: SDUPTHER

## 2025-02-19 RX ORDER — DEXAMETHASONE SODIUM PHOSPHATE 4 MG/ML
INJECTION, SOLUTION INTRA-ARTICULAR; INTRALESIONAL; INTRAMUSCULAR; INTRAVENOUS; SOFT TISSUE
Status: DISCONTINUED | OUTPATIENT
Start: 2025-02-19 | End: 2025-02-19 | Stop reason: SDUPTHER

## 2025-02-19 RX ORDER — OXYCODONE HYDROCHLORIDE 5 MG/1
10 TABLET ORAL PRN
Status: DISCONTINUED | OUTPATIENT
Start: 2025-02-19 | End: 2025-02-19 | Stop reason: HOSPADM

## 2025-02-19 RX ORDER — FENTANYL CITRATE 50 UG/ML
50 INJECTION, SOLUTION INTRAMUSCULAR; INTRAVENOUS EVERY 5 MIN PRN
Status: DISCONTINUED | OUTPATIENT
Start: 2025-02-19 | End: 2025-02-19 | Stop reason: HOSPADM

## 2025-02-19 RX ORDER — GLYCOPYRROLATE 0.2 MG/ML
INJECTION INTRAMUSCULAR; INTRAVENOUS
Status: DISCONTINUED | OUTPATIENT
Start: 2025-02-19 | End: 2025-02-19 | Stop reason: SDUPTHER

## 2025-02-19 RX ADMIN — PROPOFOL 50 MG: 10 INJECTION, EMULSION INTRAVENOUS at 15:21

## 2025-02-19 RX ADMIN — DEXMEDETOMIDINE HYDROCHLORIDE 6 MCG: 100 INJECTION, SOLUTION INTRAVENOUS at 14:18

## 2025-02-19 RX ADMIN — FENTANYL CITRATE 50 MCG: 50 INJECTION, SOLUTION INTRAMUSCULAR; INTRAVENOUS at 15:12

## 2025-02-19 RX ADMIN — LIDOCAINE HYDROCHLORIDE 100 MG: 20 INJECTION, SOLUTION INTRAVENOUS at 14:23

## 2025-02-19 RX ADMIN — FENTANYL CITRATE 25 MCG: 50 INJECTION, SOLUTION INTRAMUSCULAR; INTRAVENOUS at 14:23

## 2025-02-19 RX ADMIN — FENTANYL CITRATE 25 MCG: 50 INJECTION, SOLUTION INTRAMUSCULAR; INTRAVENOUS at 14:18

## 2025-02-19 RX ADMIN — APREPITANT 40 MG: 40 CAPSULE ORAL at 12:38

## 2025-02-19 RX ADMIN — SODIUM CHLORIDE, SODIUM LACTATE, POTASSIUM CHLORIDE, AND CALCIUM CHLORIDE: .6; .31; .03; .02 INJECTION, SOLUTION INTRAVENOUS at 15:21

## 2025-02-19 RX ADMIN — ROCURONIUM BROMIDE 50 MG: 10 INJECTION, SOLUTION INTRAVENOUS at 14:25

## 2025-02-19 RX ADMIN — PROPOFOL 50 MG: 10 INJECTION, EMULSION INTRAVENOUS at 14:24

## 2025-02-19 RX ADMIN — WATER 2000 MG: 1 INJECTION INTRAMUSCULAR; INTRAVENOUS; SUBCUTANEOUS at 14:41

## 2025-02-19 RX ADMIN — SODIUM CHLORIDE, SODIUM LACTATE, POTASSIUM CHLORIDE, AND CALCIUM CHLORIDE: .6; .31; .03; .02 INJECTION, SOLUTION INTRAVENOUS at 12:20

## 2025-02-19 RX ADMIN — PHENYLEPHRINE HYDROCHLORIDE 100 MCG: 10 INJECTION, SOLUTION INTRAVENOUS at 14:50

## 2025-02-19 RX ADMIN — PHENYLEPHRINE HYDROCHLORIDE 150 MCG: 10 INJECTION, SOLUTION INTRAVENOUS at 15:01

## 2025-02-19 RX ADMIN — DEXAMETHASONE SODIUM PHOSPHATE 4 MG: 4 INJECTION INTRA-ARTICULAR; INTRALESIONAL; INTRAMUSCULAR; INTRAVENOUS; SOFT TISSUE at 14:30

## 2025-02-19 RX ADMIN — GLYCOPYRROLATE 0.1 MG: 0.2 INJECTION INTRAMUSCULAR; INTRAVENOUS at 14:18

## 2025-02-19 RX ADMIN — PHENYLEPHRINE HYDROCHLORIDE 50 MCG: 10 INJECTION, SOLUTION INTRAVENOUS at 14:46

## 2025-02-19 RX ADMIN — SUGAMMADEX 200 MG: 100 INJECTION, SOLUTION INTRAVENOUS at 15:21

## 2025-02-19 RX ADMIN — ONDANSETRON 4 MG: 2 INJECTION INTRAMUSCULAR; INTRAVENOUS at 14:23

## 2025-02-19 RX ADMIN — GLYCOPYRROLATE 0.1 MG: 0.2 INJECTION INTRAMUSCULAR; INTRAVENOUS at 14:50

## 2025-02-19 ASSESSMENT — PAIN - FUNCTIONAL ASSESSMENT
PAIN_FUNCTIONAL_ASSESSMENT: ACTIVITIES ARE NOT PREVENTED
PAIN_FUNCTIONAL_ASSESSMENT: NONE - DENIES PAIN
PAIN_FUNCTIONAL_ASSESSMENT: 0-10

## 2025-02-19 ASSESSMENT — PAIN SCALES - GENERAL: PAINLEVEL_OUTOF10: 0

## 2025-02-19 ASSESSMENT — PAIN DESCRIPTION - DESCRIPTORS: DESCRIPTORS: DULL;PRESSURE;ACHING

## 2025-02-19 NOTE — DISCHARGE INSTRUCTIONS
Discharge Instructions:    Diet:   You may resume a regular diet.    Wound Care:   Skin glue was used to cover your incision(s). It will fall off on its own in about 10 days. You may shower, but do not scrub the incision sites directly or soak (tub, pool, etc.).    Activity:   No heavy lifting greater than a milk jug until follow up.    Pain management:   Unless informed of any restrictions by your primary care physician, please use your preferred over-the-counter pain reliever as your primary pain medication.     Return Precautions:   Call/ Return to ED for increased redness, worsening pain, drainage from wound, fevers, or any other concerns about your incision or post op course.      Follow up with Dr. Tirado in 1-2 weeks. Please call [phone] to schedule your appointment.    [facility] AMBULATORY PROCEDURE DISCHARGE INSTRUCTIONS      Date: 25   Patient: Mechelle Fleming,  Age: 74 y.o., (:1950)     Procedure(s):  Diagnostic Laparoscopy and Right Subclavian Port Placement  .     There are potential side effects of anesthesia or sedation you may experience for the first 24 hours.  These side effects include:    Confusion or Memory loss, Dizziness, or Delayed Reaction Times   [x]A responsible person should be with you for the next 24 hours.  Do not operate any vehicles (automobiles, bicycles, motorcycles) or power tools or machinery for 24 hours.  Do not sign any legal documents or make any legal decisions for 24 hours. Do not drink alcohol for 24 hours or while taking narcotic pain medication.      Nausea    [x]Start with light diet and progress to your normal diet as you feel like eating. However, if you experience nausea or repeated episodes of vomiting which persist beyond 12-24 hours, notify your physician.  Once nausea has passed, remember to keep drinking fluids.    Difficulty Passing Urine  [x]Drink extra amounts of fluid today.  Notify your physician if you have not urinated  sleep and daytime rest during your recovery at home.  Surgery and Anesthesia place a significant amount of stress on your body.  Using your CPAP will help keep you safe and lessen the negative effects of that stress.      CALL YOUR PHYSICIAN FOR:  Watch for these possible complications, symptoms, or side effects of anesthesia.  Call physician if they or any other problems occur:    Signs of INFECTION   > Fever over 101°     > Redness, swelling, hardness or warmth at the operative site   >Foul smelling or cloudy drainage at the operative site   Unrelieved PAIN  Unrelieved NAUSEA  Blood soaked dressing.  (Some oozing may be normal)  Inability to urinate      Numb, pale, blue, cold or tingling extremity        If you smoke STOP. We care about your health!

## 2025-02-19 NOTE — FLOWSHEET NOTE
PACU Transfer to [facility]    Pt meets criteria to transfer to next phase of care per SERENA SCORE and ASPAN standards    Pain upon d/c from PACU:  denies pain  Serena Phase I: Sreena Score: 10  Post-op Nausea & Vomiting: no nausea and no vomiting  Last Vitals:   Vitals:    02/19/25 1600   BP: (!) 144/77   Pulse: 76   Resp: 13   Temp: 97.4 °F (36.3 °C)   SpO2: 96%       In: 1000 [I.V.:1000]  Out: 10       Pt taken to [facility] #4 by MAHNAZ RN  Family/identified trustworthy individual(s) updated on POC and change in pt's location.

## 2025-02-19 NOTE — H&P
General Surgery   Resident Consult Note    CC: pancreatic cancer    History of Present Illness:   Mechelle Fleming is a 74 y.o. female with Hx of pancreatic cancer who presents today for elective diagnostic laparoscopy and port placement. She denies any symptoms at this time. She denies any questions for surgery. No other issues.     Past Medical History:        Diagnosis Date    Acute gastric ulcer without hemorrhage or perforation     secondary to NSAID use    Aortic calcification     Asthma     Childhood    Chronic back pain 2021    Following spinal compression fracture    Compression fracture of T10 vertebra ([facility])     fall    Fracture of left ankle     fall    Fracture of T12 vertebra ([facility])     fall    Nonrheumatic mitral valve regurgitation     Nonrheumatic tricuspid valve regurgitation     Osteoarthritis 2006    Other pulmonary embolism without acute cor pulmonale ([facility]) 2012    Pancreatic cancer ([facility])     Personal history of venous thrombosis and embolism 10/29/2019    after knee replacement    PONV (postoperative nausea and vomiting)     Primary insomnia 2021    Pure hypercholesterolemia 2021    Recurrent major depressive disorder, in partial remission 2021    Restless leg syndrome 2021       Past Surgical History:        Procedure Laterality Date     SECTION      CHOLECYSTECTOMY, LAPAROSCOPIC N/A     COSMETIC SURGERY      ENDOSCOPIC ULTRASOUND (UPPER)  2025    Dr Gumaro Rasmussen    FACIAL COSMETIC SURGERY  2014    FINGER FRACTURE SURGERY  2012    JOINT REPLACEMENT Right 2018    JOINT REPLACEMENT Left     KNEE ARTHROPLASTY  2018    And left knee 2020    KNEE ARTHROSCOPY Right 2019    right    KNEE SURGERY  10/2019    open synovectomy    SHOULDER ARTHROPLASTY Right 2017    SHOULDER ARTHROPLASTY Left 2018    TONSILLECTOMY  1957    UPPER GASTROINTESTINAL ENDOSCOPY  2018    Done with colonoscopy    UPPER

## 2025-02-19 NOTE — BRIEF OP NOTE
Brief Postoperative Note      Patient: Mechelle Fleming  YOB: 1950  MRN: [id number]    Date of Procedure: 2/19/2025    Pre-Op Diagnosis Codes:      * Pancreatic cancer (HCC) [C25.9]    Post-Op Diagnosis: Same       Procedure(s):  Diagnostic Laparoscopy and Right Subclavian Port Placement  .    Surgeon(s):  Domingo Tirado MD    Assistant:  Resident: Milo Marvin DO    Anesthesia: General    Estimated Blood Loss (mL): Minimal    Complications: None    Specimens:   * No specimens in log *    Implants:  * No implants in log *      Drains:   NG/OG/NJ/NE Tube Orogastric 18 fr Center mouth (Active)       Findings:  Infection Present At Time Of Surgery (PATOS) (choose all levels that have infection present):  No infection present  Other Findings: Right subclavian port placed under fluoroscopic guidance. Diagnostic laparoscopy without any signs of peritoneal or hepatic metastasis.     Electronically signed by Milo Marvin DO on 2/19/2025 at 3:28 PM

## 2025-02-19 NOTE — ANESTHESIA PRE PROCEDURE
Lab Results   Component Value Date/Time    PROTIME 11.2 03/10/2017 02:11 PM    INR 1.0 03/10/2017 02:11 PM    APTT 31.8 03/10/2017 02:11 PM     HCG (If Applicable) No results found for: \"PREGTESTUR\", \"PREGSERUM\", \"HCG\", \"HCGQUANT\"   ABGs No results found for: \"PHART\", \"PO2ART\", \"NWS4QXL\", \"UEB0WZP\", \"BEART\", \"P1FIYRPC\"   Type & Screen (If Applicable)  Lab Results   Component Value Date    LABABO A 03/20/2017    LABABO A 03/20/2017                            BMI: Wt Readings from Last 3 Encounters:       NPO Status:   Date of last liquid consumption: 02/18/25   Time of last liquid consumption: 2000   Date of last solid food consumption: 02/18/25      Time of last solid consumption: 2000       Anesthesia Evaluation  Patient summary reviewed   history of anesthetic complications: PONV.  Airway: Mallampati: III  TM distance: >3 FB   Neck ROM: full  Mouth opening: > = 3 FB   Dental: normal exam         Pulmonary:normal exam    (+)           asthma:                            Cardiovascular:  Exercise tolerance: good (>4 METS)  (+) valvular problems/murmurs (TR): MR      ECG reviewed  Rhythm: regular  Rate: normal           Beta Blocker:  Not on Beta Blocker         Neuro/Psych:   (+) headaches:, psychiatric history:depression/anxiety             GI/Hepatic/Renal:   (+) GERD: well controlled, PUD     (-) liver disease and no renal disease       Endo/Other:    (+) hypothyroidism::..    (-) diabetes mellitus, blood dyscrasia               Abdominal:   (+) obese          Vascular: negative vascular ROS.         Other Findings:             Anesthesia Plan      MAC     ASA 3       Induction: intravenous.    MIPS: Prophylactic antiemetics administered.  Anesthetic plan and risks discussed with patient and spouse.      Plan discussed with CRNA.

## 2025-02-19 NOTE — ANESTHESIA POSTPROCEDURE EVALUATION
Department of Anesthesiology  Postprocedure Note    Patient: Mechelle Fleming  MRN: [id number]  YOB: 1950  Date of evaluation: 2/19/2025    Procedure Summary       Date: 02/19/25 Room / Location: [place] [id number] / [facility]    Anesthesia Start: 1418 Anesthesia Stop: 1535    Procedures:       Diagnostic Laparoscopy and Right Subclavian Port Placement (Abdomen)      . (Chest) Diagnosis:       Pancreatic cancer (HCC)      (Pancreatic cancer (HCC) [C25.9])    Surgeons: Domingo Tirado MD Responsible Provider: Bipin Jones DO    Anesthesia Type: MAC ASA Status: 3            Anesthesia Type: No value filed.    Essie Phase I: Essie Score: 10    Essie Phase II: Essie Score: 10    Vitals:    02/19/25 1608   BP: (!) 152/76   Pulse: 78   Resp: 14   Temp: 97 °F (36.1 °C)   SpO2: 96%       Anesthesia Post Evaluation    Patient location during evaluation: PACU  Patient participation: complete - patient participated  Level of consciousness: awake and awake and alert  Pain score: 0  Airway patency: patent  Nausea & Vomiting: no nausea and no vomiting  Cardiovascular status: hemodynamically stable  Respiratory status: acceptable  Hydration status: euvolemic  Pain management: adequate and satisfactory to patient        No notable events documented.

## 2025-02-19 NOTE — PROGRESS NOTES
[facility] PRE-SURGICAL TESTING INSTRUCTIONS                      PRIOR TO PROCEDURE DATE:    1. PLEASE FOLLOW ANY INSTRUCTIONS GIVEN TO YOU PER YOUR SURGEON.      2. Arrange for someone to drive you home and be with you for the first 24 hours after discharge for your safety after your procedure for which you received sedation. Ensure it is someone we can share information with regarding your discharge.     NOTE: At this time ONLY 2 ADULTS may accompany you   One person ENCOURAGED to stay at hospital entire time if outpatient surgery      3. You must contact your surgeon for instructions IF:  You are taking any blood thinners, aspirin, anti-inflammatory or vitamins.  There is a change in your physical condition such as a cold, fever, rash, cuts, sores, or any other infection, especially near your surgical site.    4. Do not drink alcohol the day before or day of your procedure.  Do not use any recreational marijuana at least 24 hours or street drugs (heroin, cocaine) at minimum 5 days prior to your procedure.     5. A Pre-Surgical History and Physical MUST be completed WITHIN 30 DAYS OR LESS prior to your procedure.by your Physician or an Urgent Care        THE DAY OF YOUR PROCEDURE:  1.  Follow instructions for ARRIVAL TIME as DIRECTED BY YOUR SURGEON.     2. Enter the MAIN entrance from [facility] and follow the signs to the free Parking Garage or  Parking (offered free of charge 7 am-5pm).      3. Enter the Main Entrance of the hospital (do not enter from the lower level of the parking garage). Upon entrance, check in with the  at the surgical information desk on your LEFT.   Bring your insurance card and photo ID to register      4. DO NOT EAT ANYTHING 8 hours prior to arrival for surgery.  You may have up to 8 ounces of water 4 hours prior to your arrival for surgery.   NOTE: ALL Gastric, Bariatric & Bowel surgery patients - you MUST follow your surgeon's instructions regarding 
Ambulatory Surgery/Procedure Discharge Note    Vitals:    02/19/25 1608   BP: (!) 152/76   Pulse: 78   Resp: 14   Temp: 97 °F (36.1 °C)   SpO2: 96%   Per Essie score patient meets requirements for discharge.     In: 1200 [P.O.:200; I.V.:1000]  Out: 10     Restroom use offered before discharge.  Yes    Pain assessment:  none  Pain Level: 0    Pt discharged.  Discharge instructions reviewed with pt and spouse.  Pt tolerating PO fluids and VSS.  Written handouts given to pt.  Pt denies any needs at home and after discharge.  Pt's spouse here to take her home.  Escorted pt to front door in a wheelchair.       Patient discharged to home/self care. Patient discharged via wheel chair by transporter to waiting family/S.O.       2/19/2025 4:48PM  
Patient complaining of dry irritated left eye. Given wet wash cloth to see if will help. Offered to reach out to provider patient declined and stated \"I'll use some eye drops at home\".   
Pt arrived calm no SS of pain or CO report received from CRNA and RN circulator Diagnostic Laparoscopy and Right Subclavian Port Placement   
XRAY at bedside  
XRAY results show no pneumo. Will send to [facility] for D/C  
(3) no apparent problem

## 2025-02-20 ENCOUNTER — PATIENT MESSAGE (OUTPATIENT)
Dept: PRIMARY CARE CLINIC | Age: 75
End: 2025-02-20

## 2025-02-20 DIAGNOSIS — F33.41 RECURRENT MAJOR DEPRESSIVE DISORDER, IN PARTIAL REMISSION: ICD-10-CM

## 2025-02-20 LAB
EKG ATRIAL RATE: 75 BPM
EKG DIAGNOSIS: NORMAL
EKG P AXIS: 36 DEGREES
EKG P-R INTERVAL: 186 MS
EKG Q-T INTERVAL: 410 MS
EKG QRS DURATION: 98 MS
EKG QTC CALCULATION (BAZETT): 457 MS
EKG R AXIS: -18 DEGREES
EKG T AXIS: 19 DEGREES
EKG VENTRICULAR RATE: 75 BPM

## 2025-02-20 PROCEDURE — 93010 ELECTROCARDIOGRAM REPORT: CPT | Performed by: INTERNAL MEDICINE

## 2025-02-20 NOTE — TELEPHONE ENCOUNTER
Medication:   Requested Prescriptions     Pending Prescriptions Disp Refills    buPROPion (WELLBUTRIN XL) 300 MG extended release tablet 90 tablet 1     Sig: Take 1 tablet by mouth every morning        Last Filled: [id number] NEW PHARMACY NEEDED     Patient Phone Number: [phone] (home)     Last appt: 2/11/2025   Next appt: Visit date not found    Last OARRS:        No data to display

## 2025-02-20 NOTE — TELEPHONE ENCOUNTER
Patient calling into office, she got a notification from [facility] that her buPROPion (WELLBUTRIN XL) 300 MG extended release tablet has raised $200 in cost. She is requesting the prescription from "[facility] [employer] [employer] be cancelled and sent to [facility] in [place].     Patient tried having [facility] transfer the scripts, however mail order pharmacies are unable to transfer prescriptions.

## 2025-02-21 ENCOUNTER — HOSPITAL ENCOUNTER (OUTPATIENT)
Dept: CT IMAGING | Age: 75
Discharge: HOME | End: 2025-02-21
Attending: INTERNAL MEDICINE
Payer: MEDICARE

## 2025-02-21 DIAGNOSIS — F33.41 RECURRENT MAJOR DEPRESSIVE DISORDER, IN PARTIAL REMISSION: ICD-10-CM

## 2025-02-21 DIAGNOSIS — C25.9 PANCREATIC ADENOCARCINOMA (HCC): ICD-10-CM

## 2025-02-21 LAB
CREAT SERPL-MCNC: 0.6 MG/DL (ref 0.6–1.2)
GFR SERPLBLD CREATININE-BSD FMLA CKD-EPI: >90 ML/MIN/{1.73_M2}

## 2025-02-21 PROCEDURE — 6360000004 HC RX CONTRAST MEDICATION: Performed by: INTERNAL MEDICINE

## 2025-02-21 PROCEDURE — 36415 COLL VENOUS BLD VENIPUNCTURE: CPT

## 2025-02-21 PROCEDURE — 71250 CT THORAX DX C-: CPT

## 2025-02-21 PROCEDURE — 74178 CT ABD&PLV WO CNTR FLWD CNTR: CPT

## 2025-02-21 PROCEDURE — 82565 ASSAY OF CREATININE: CPT

## 2025-02-21 RX ORDER — BUPROPION HYDROCHLORIDE 300 MG/1
300 TABLET ORAL EVERY MORNING
Qty: 90 TABLET | Refills: 1 | Status: SHIPPED | OUTPATIENT
Start: 2025-02-21

## 2025-02-21 RX ORDER — IOPAMIDOL 755 MG/ML
75 INJECTION, SOLUTION INTRAVASCULAR
Status: COMPLETED | OUTPATIENT
Start: 2025-02-21 | End: 2025-02-21

## 2025-02-21 RX ORDER — BUPROPION HYDROCHLORIDE 300 MG/1
300 TABLET ORAL EVERY MORNING
Qty: 90 TABLET | Refills: 1 | OUTPATIENT
Start: 2025-02-21

## 2025-02-21 RX ADMIN — IOPAMIDOL 75 ML: 755 INJECTION, SOLUTION INTRAVENOUS at 12:51

## 2025-02-21 NOTE — TELEPHONE ENCOUNTER
Patient needs sent to [facility] in [place] - [facility] will not transfer    Medication:   Requested Prescriptions     Pending Prescriptions Disp Refills    buPROPion (WELLBUTRIN XL) 300 MG extended release tablet 90 tablet 1     Sig: Take 1 tablet by mouth every morning        Last Filled:      Patient Phone Number: [phone] (home)     Last appt: 2/11/2025   Next appt: Visit date not found    Last OARRS:        No data to display

## 2025-03-03 ENCOUNTER — TELEPHONE (OUTPATIENT)
Dept: SURGERY | Age: 75
End: 2025-03-03

## 2025-03-03 NOTE — TELEPHONE ENCOUNTER
Call placed to [facility] as post-op call / chemo check-in.   She has completed 1 cycle of modified FOLFIRINOX.   She reports she is not having any problems with her port or diagnostic lap sites.   She is having significant problems with mouth sores after chemo.   She has not notified [facility], but does have a follow up appt there on Wednesday. She is encouraged to call their office today to discuss management options for this particular side effect.     She is going to have a second opinion this week at [facility].   Post-op appointment for tomorrow cancelled.   Instructed to call with any questions or concerns. Verbalized understanding.

## 2025-03-23 NOTE — OP NOTE
electrocautery, a pocket was created for the port. The dilator introducer was then introduced without difficulty over guidewire. The dilator and guidewire was subsequently removed and the port-a-cath catheter advanced. Sheath was removed. Catheter location was checked with fluoroscopy. The catheter was divided near the pocket and then connected to the port. The port was placed in the pocket and fixed to the underlying tissues with Ethibond suture. The port was subsequently aspirated and flushed without difficulty. Using 3-0 Vicryl, the subcutaneous and dermal layers were reapproximated. Skin was placed over incision.   Fluoroscopy interpretation: I used real time fluoroscopic guidance thorough out the procedure to confirm location of guide wire and catheter. Final position of catheter in its entire course was checked using fluoroscopy. Images are interpreted by me during the procedure.     Now out attention was turned to perform diagnostic laparoscopy. A small LUQ incision was made with a scalpel. Peritoneal cavity entered by optiview under vision and trocar cannula placed. Once the trocar was placed in, the gas was attached to the trocar. Camera was inserted into the peritoneal cavity. Next, a small incision using a knife was made in the right side of abdomen. A 5-mm trocar was then placed in. Using the 5-mm camera, the abdomen was inspected systematically. Thorough exploration done. No definitive evidence of metastatic disease seen.   Ports removed and pneumoperitoneum relieved. The abdomen was allowed to desufflate fully. The skin of each incision was then closed with running 4-0 Monocryl subcuticular sutures. Derma flex applied. Sponge and needle count were reported to be correct x 2. Patient extubated and transferred to recovery room uneventfully. I was present for the entire procedure.     Domingo Tirado MD   Surgery Attending

## 2025-03-26 NOTE — PROGRESS NOTES
Sitting   BP Cuff Size: Medium Adult   Pulse: 88   Resp: 12   Temp: 97.6 °F (36.4 °C)   Weight: 80.4 kg (177 lb 3.2 oz)      Body mass index is 27.75 kg/m².   Physical Exam  Constitutional:       Appearance: Normal appearance.   HENT:      Head: Normocephalic and atraumatic.   Neurological:      General: No focal deficit present.      Mental Status: She is alert and oriented to person, place, and time.   Psychiatric:         Attention and Perception: Attention and perception normal.         Mood and Affect: Mood and affect normal.         Speech: Speech normal.         Behavior: Behavior normal. Behavior is cooperative.         Thought Content: Thought content normal.         Cognition and Memory: Cognition and memory normal.         Judgment: Judgment normal.         Approximately 40 minutes of time were spent in preparation, direct patient contact, care coordination, documentation and activities otherwise related to this encounter.     Electronically signed by KARINE TAYLOR MD on 3/27/2025 at 9:42 AM.    Please note this chart was generated using dragon dictation software.  Although every effort was made to ensure the accuracy of this automated transcription, some errors in transcription may have occurred.

## 2025-03-27 ENCOUNTER — OFFICE VISIT (OUTPATIENT)
Dept: PRIMARY CARE CLINIC | Age: 75
End: 2025-03-27

## 2025-03-27 VITALS
BODY MASS INDEX: 27.75 KG/M2 | HEART RATE: 88 BPM | RESPIRATION RATE: 12 BRPM | DIASTOLIC BLOOD PRESSURE: 78 MMHG | WEIGHT: 177.2 LBS | SYSTOLIC BLOOD PRESSURE: 124 MMHG | TEMPERATURE: 97.6 F

## 2025-03-27 DIAGNOSIS — F51.01 PRIMARY INSOMNIA: ICD-10-CM

## 2025-03-27 DIAGNOSIS — E11.9 TYPE 2 DIABETES MELLITUS WITHOUT COMPLICATION, WITHOUT LONG-TERM CURRENT USE OF INSULIN: Primary | ICD-10-CM

## 2025-03-27 DIAGNOSIS — C25.9 PRIMARY MALIGNANT NEOPLASM OF PANCREAS (HCC): ICD-10-CM

## 2025-03-27 LAB
ALBUMIN SERPL-MCNC: 3.8 G/DL (ref 3.4–5)
ALBUMIN/GLOB SERPL: 1.6 {RATIO} (ref 1.1–2.2)
ALP SERPL-CCNC: 63 U/L (ref 40–129)
ALT SERPL-CCNC: 21 U/L (ref 10–40)
ANION GAP SERPL CALCULATED.3IONS-SCNC: 9 MMOL/L (ref 3–16)
AST SERPL-CCNC: 20 U/L (ref 15–37)
BILIRUB SERPL-MCNC: 0.4 MG/DL (ref 0–1)
BUN SERPL-MCNC: 11 MG/DL (ref 7–20)
CALCIUM SERPL-MCNC: 9.3 MG/DL (ref 8.3–10.6)
CHLORIDE SERPL-SCNC: 100 MMOL/L (ref 99–110)
CO2 SERPL-SCNC: 26 MMOL/L (ref 21–32)
CREAT SERPL-MCNC: 0.5 MG/DL (ref 0.6–1.2)
CREAT UR-MCNC: 143 MG/DL (ref 28–259)
DEPRECATED RDW RBC AUTO: 14.5 % (ref 12.4–15.4)
GFR SERPLBLD CREATININE-BSD FMLA CKD-EPI: >90 ML/MIN/{1.73_M2}
GLUCOSE SERPL-MCNC: 260 MG/DL (ref 70–99)
HCT VFR BLD AUTO: 28.6 % (ref 36–48)
HGB BLD-MCNC: 9.6 G/DL (ref 12–16)
LIPASE SERPL-CCNC: 15 U/L (ref 13–60)
MCH RBC QN AUTO: 30 PG (ref 26–34)
MCHC RBC AUTO-ENTMCNC: 33.4 G/DL (ref 31–36)
MCV RBC AUTO: 89.7 FL (ref 80–100)
MICROALBUMIN UR DL<=1MG/L-MCNC: <1.2 MG/DL
MICROALBUMIN/CREAT UR: NORMAL MG/G (ref 0–30)
PLATELET # BLD AUTO: 117 K/UL (ref 135–450)
PMV BLD AUTO: 8 FL (ref 5–10.5)
POTASSIUM SERPL-SCNC: 3.9 MMOL/L (ref 3.5–5.1)
PROT SERPL-MCNC: 6.2 G/DL (ref 6.4–8.2)
RBC # BLD AUTO: 3.19 M/UL (ref 4–5.2)
SODIUM SERPL-SCNC: 135 MMOL/L (ref 136–145)
WBC # BLD AUTO: 3.4 K/UL (ref 4–11)

## 2025-03-27 RX ORDER — AVOBENZONE, HOMOSALATE, OCTISALATE, OCTOCRYLENE 30; 40; 45; 26 MG/ML; MG/ML; MG/ML; MG/ML
1 CREAM TOPICAL DAILY
Qty: 100 EACH | Refills: 4 | Status: SHIPPED | OUTPATIENT
Start: 2025-03-27

## 2025-03-27 RX ORDER — ONDANSETRON 8 MG/1
8 TABLET, FILM COATED ORAL EVERY 8 HOURS PRN
COMMUNITY
Start: 2025-03-05

## 2025-03-27 RX ORDER — LIDOCAINE AND PRILOCAINE 25; 25 MG/G; MG/G
1 CREAM TOPICAL PRN
COMMUNITY
Start: 2025-02-21

## 2025-03-27 RX ORDER — TEMAZEPAM 15 MG/1
15 CAPSULE ORAL NIGHTLY PRN
Qty: 30 CAPSULE | Refills: 0 | Status: SHIPPED | OUTPATIENT
Start: 2025-03-27 | End: 2025-04-26

## 2025-03-28 ENCOUNTER — RESULTS FOLLOW-UP (OUTPATIENT)
Dept: PRIMARY CARE CLINIC | Age: 75
End: 2025-03-28

## 2025-03-29 NOTE — PROGRESS NOTES
Mechelle Fleming (:  1950) is a 75 y.o. female,Established patient, here for evaluation of the following chief complaint(s): Follow-up      ASSESSMENT/PLAN:  1. Type 2 diabetes mellitus without complication, without long-term current use of insulin (HCC)  2. Primary insomnia  3. Primary malignant neoplasm of pancreas (HCC)  With great improvement of her blood sugar to fasting 150 do not recommend change of her dose at this time do continue to monitor especially fasting blood sugar.    Recommend that she use 2 of her temazepam for total of 30 mg dose over this next week and monitor for improvement and duration of sleep      Do recommend that she reschedule her PET scan.  Now that her blood sugar is controlled she will be able to complete this.  If she has any difficulty with her blood sugar increasing prior to the date of that test do call the office and we can adjust her insulin further    Return in about 1 week (around 2025).    SUBJECTIVE/OBJECTIVE:    Diabetes-she began her insulin dosing using 10 units each night  Initial fasting blood sugar Friday was 248, Saturday 334,  night 197, Monday 152  She is feeling better overall having recovered more from her last chemotherapy treatment.  No low blood sugar episodes    Sleep-she continues the 15 mg dose of temazepam and finds that this has helped improve her sleep initiation.  She has found however that she does wake approximately 2 hours later and then has difficulty falling back asleep.  She did not want to take another temazepam at that time she did take Tylenol PM but this was not as helpful    Pancreatic cancer-she did hear back from [facility] who recommend that she continue her care through her local oncologist    [INSTRUCTIONS:  \"[x]\" Indicates a positive item  \"[]\" Indicates a negative item  -- DELETE ALL ITEMS NOT EXAMINED]    Constitutional: [x] Appears well-developed and well-nourished [x] No apparent distress      [] Abnormal -

## 2025-03-30 ASSESSMENT — PATIENT HEALTH QUESTIONNAIRE - PHQ9
SUM OF ALL RESPONSES TO PHQ QUESTIONS 1-9: 6
6. FEELING BAD ABOUT YOURSELF - OR THAT YOU ARE A FAILURE OR HAVE LET YOURSELF OR YOUR FAMILY DOWN: NOT AT ALL
5. POOR APPETITE OR OVEREATING: SEVERAL DAYS
7. TROUBLE CONCENTRATING ON THINGS, SUCH AS READING THE NEWSPAPER OR WATCHING TELEVISION: NOT AT ALL
9. THOUGHTS THAT YOU WOULD BE BETTER OFF DEAD, OR OF HURTING YOURSELF: NOT AT ALL
3. TROUBLE FALLING OR STAYING ASLEEP: NEARLY EVERY DAY
SUM OF ALL RESPONSES TO PHQ QUESTIONS 1-9: 6
SUM OF ALL RESPONSES TO PHQ QUESTIONS 1-9: 6
8. MOVING OR SPEAKING SO SLOWLY THAT OTHER PEOPLE COULD HAVE NOTICED. OR THE OPPOSITE, BEING SO FIGETY OR RESTLESS THAT YOU HAVE BEEN MOVING AROUND A LOT MORE THAN USUAL: NOT AT ALL
4. FEELING TIRED OR HAVING LITTLE ENERGY: SEVERAL DAYS
10. IF YOU CHECKED OFF ANY PROBLEMS, HOW DIFFICULT HAVE THESE PROBLEMS MADE IT FOR YOU TO DO YOUR WORK, TAKE CARE OF THINGS AT HOME, OR GET ALONG WITH OTHER PEOPLE: SOMEWHAT DIFFICULT
SUM OF ALL RESPONSES TO PHQ QUESTIONS 1-9: 6
2. FEELING DOWN, DEPRESSED OR HOPELESS: SEVERAL DAYS
1. LITTLE INTEREST OR PLEASURE IN DOING THINGS: NOT AT ALL

## 2025-03-31 ENCOUNTER — TELEPHONE (OUTPATIENT)
Dept: PRIMARY CARE CLINIC | Age: 75
End: 2025-03-31

## 2025-03-31 ENCOUNTER — TELEMEDICINE (OUTPATIENT)
Dept: PRIMARY CARE CLINIC | Age: 75
End: 2025-03-31

## 2025-03-31 DIAGNOSIS — F51.01 PRIMARY INSOMNIA: ICD-10-CM

## 2025-03-31 DIAGNOSIS — E11.9 TYPE 2 DIABETES MELLITUS WITHOUT COMPLICATION, WITHOUT LONG-TERM CURRENT USE OF INSULIN: Primary | ICD-10-CM

## 2025-03-31 DIAGNOSIS — C25.9 PRIMARY MALIGNANT NEOPLASM OF PANCREAS (HCC): ICD-10-CM

## 2025-03-31 NOTE — TELEPHONE ENCOUNTER
\"Blank\" Diabetic Standard Written Order / [facility]    PCP: Originals Scanned to Media Mgr. and placed in MA Folder for further processing.  Please route message back to department pool and return original paperwork to MA, MA place paperwork in Reg folder for fax/scanning.

## 2025-04-01 NOTE — TELEPHONE ENCOUNTER
\"Completed\" Diabetic Standard Written Order / [facility]     Completed Diabetic Standard Written Order / [facility] Forms have been signed, scanned to Media Manager, and Faxed to [phone].    Confirmation Fax Received      No further action Required.

## 2025-04-06 NOTE — PROGRESS NOTES
Mouth/Throat: Mucous membranes are moist    External Ears [x] Normal  [] Abnormal -    Neck: [x] No visualized mass [] Abnormal -     Pulmonary/Chest: [x] Respiratory effort normal   [x] No visualized signs of difficulty breathing or respiratory distress        [] Abnormal -      Musculoskeletal:   [x] Normal gait with no signs of ataxia         [x] Normal range of motion of neck        [] Abnormal -     Neurological:        [x] No Facial Asymmetry (Cranial nerve 7 motor function) (limited exam due to video visit)          [x] No gaze palsy        [] Abnormal -          Skin:        [x] No significant exanthematous lesions or discoloration noted on facial skin         [] Abnormal -            Psychiatric:       [x] Normal Affect [] Abnormal -        [x] No Hallucinations    Other pertinent observable physical exam findings:-    Electronically signed by KARINE TAYLOR MD on 4/7/2025 at 2:28 PM     Please note this chart was generated using dragon dictation software.  Although every effort was made to ensure the accuracy of this automated transcription, some errors in transcription may have occurred.     Mechelle Fleming, was evaluated through a synchronous (real-time) audio-video encounter. The patient (or guardian if applicable) is aware that this is a billable service, which includes applicable co-pays. This Virtual Visit was conducted with patient's (and/or legal guardian's) consent. Patient identification was verified, and a caregiver was present when appropriate.   The patient was located at Home: [address] [place] [id number]  Provider was located at Facility (Appt Dept): [address] [facility] [address] [place] [address]  Confirm you are appropriately licensed, registered, or certified to deliver care in the [facility] where the patient is located as indicated above. If you are not or unsure, please re-schedule the visit: Yes, I confirm.

## 2025-04-07 ENCOUNTER — TELEMEDICINE (OUTPATIENT)
Dept: PRIMARY CARE CLINIC | Age: 75
End: 2025-04-07

## 2025-04-07 DIAGNOSIS — E11.9 TYPE 2 DIABETES MELLITUS WITHOUT COMPLICATION, WITHOUT LONG-TERM CURRENT USE OF INSULIN: Primary | ICD-10-CM

## 2025-04-07 DIAGNOSIS — Z01.84 IMMUNITY STATUS TESTING: ICD-10-CM

## 2025-04-07 DIAGNOSIS — F51.01 PRIMARY INSOMNIA: ICD-10-CM

## 2025-04-07 RX ORDER — ZOLPIDEM TARTRATE 5 MG/1
5 TABLET ORAL NIGHTLY PRN
Qty: 30 TABLET | Refills: 0 | Status: SHIPPED | OUTPATIENT
Start: 2025-04-07 | End: 2025-05-07

## 2025-04-07 RX ORDER — ALPRAZOLAM 0.25 MG
0.25 TABLET ORAL NIGHTLY PRN
COMMUNITY

## 2025-04-07 RX ORDER — ALPRAZOLAM 0.25 MG
1 TABLET ORAL
COMMUNITY
Start: 2025-02-11 | End: 2025-04-07 | Stop reason: SDUPTHER

## 2025-04-09 LAB
ALBUMIN: 3.5 G/DL
ALP BLD-CCNC: 47 U/L
ALT SERPL-CCNC: 13 U/L
ANION GAP SERPL CALCULATED.3IONS-SCNC: ABNORMAL MMOL/L
AST SERPL-CCNC: 26 U/L
BASOPHILS ABSOLUTE: 0.04 /ΜL
BASOPHILS RELATIVE PERCENT: 1.4 %
BILIRUB SERPL-MCNC: 0.6 MG/DL (ref 0.1–1.4)
BUN BLDV-MCNC: 15 MG/DL
CALCIUM SERPL-MCNC: 9.6 MG/DL
CHLORIDE BLD-SCNC: 105 MMOL/L
CO2: 29 MMOL/L
CREAT SERPL-MCNC: 0.5 MG/DL
EOSINOPHILS ABSOLUTE: 0.24 /ΜL
EOSINOPHILS RELATIVE PERCENT: 8.4 %
GFR, ESTIMATED: >60
GLUCOSE BLD-MCNC: 128 MG/DL
HCT VFR BLD CALC: 30.9 % (ref 36–46)
HEMOGLOBIN: 9.8 G/DL (ref 12–16)
IRON: 53
LYMPHOCYTES ABSOLUTE: 1.02 /ΜL
LYMPHOCYTES RELATIVE PERCENT: 35.5 %
MCH RBC QN AUTO: 29.7 PG
MCHC RBC AUTO-ENTMCNC: 31.7 G/DL
MCV RBC AUTO: 93.6 FL
MONOCYTES ABSOLUTE: 0.44 /ΜL
MONOCYTES RELATIVE PERCENT: 15.3 %
NEUTROPHILS ABSOLUTE: 1.13 /ΜL
NEUTROPHILS RELATIVE PERCENT: 39.4 %
PLATELET # BLD: 121 K/ΜL
PMV BLD AUTO: ABNORMAL FL
POTASSIUM SERPL-SCNC: 4 MMOL/L
RBC # BLD: 3.3 10^6/ΜL
SODIUM BLD-SCNC: 138 MMOL/L
TOTAL IRON BINDING CAPACITY: 284
TOTAL PROTEIN: 6.4 G/DL (ref 6.4–8.2)
WBC # BLD: 2.9 10^3/ML

## 2025-04-20 SDOH — HEALTH STABILITY: PHYSICAL HEALTH: ON AVERAGE, HOW MANY MINUTES DO YOU ENGAGE IN EXERCISE AT THIS LEVEL?: 30 MIN

## 2025-04-20 SDOH — HEALTH STABILITY: PHYSICAL HEALTH: ON AVERAGE, HOW MANY DAYS PER WEEK DO YOU ENGAGE IN MODERATE TO STRENUOUS EXERCISE (LIKE A BRISK WALK)?: 2 DAYS

## 2025-04-20 ASSESSMENT — PATIENT HEALTH QUESTIONNAIRE - PHQ9
10. IF YOU CHECKED OFF ANY PROBLEMS, HOW DIFFICULT HAVE THESE PROBLEMS MADE IT FOR YOU TO DO YOUR WORK, TAKE CARE OF THINGS AT HOME, OR GET ALONG WITH OTHER PEOPLE: SOMEWHAT DIFFICULT
SUM OF ALL RESPONSES TO PHQ QUESTIONS 1-9: 13
6. FEELING BAD ABOUT YOURSELF - OR THAT YOU ARE A FAILURE OR HAVE LET YOURSELF OR YOUR FAMILY DOWN: SEVERAL DAYS
8. MOVING OR SPEAKING SO SLOWLY THAT OTHER PEOPLE COULD HAVE NOTICED. OR THE OPPOSITE, BEING SO FIGETY OR RESTLESS THAT YOU HAVE BEEN MOVING AROUND A LOT MORE THAN USUAL: NOT AT ALL
9. THOUGHTS THAT YOU WOULD BE BETTER OFF DEAD, OR OF HURTING YOURSELF: NOT AT ALL
5. POOR APPETITE OR OVEREATING: MORE THAN HALF THE DAYS
7. TROUBLE CONCENTRATING ON THINGS, SUCH AS READING THE NEWSPAPER OR WATCHING TELEVISION: SEVERAL DAYS
SUM OF ALL RESPONSES TO PHQ QUESTIONS 1-9: 13
3. TROUBLE FALLING OR STAYING ASLEEP: NEARLY EVERY DAY
1. LITTLE INTEREST OR PLEASURE IN DOING THINGS: SEVERAL DAYS
SUM OF ALL RESPONSES TO PHQ QUESTIONS 1-9: 13
SUM OF ALL RESPONSES TO PHQ QUESTIONS 1-9: 13

## 2025-04-20 ASSESSMENT — LIFESTYLE VARIABLES
HOW OFTEN DO YOU HAVE A DRINK CONTAINING ALCOHOL: MONTHLY OR LESS
HOW OFTEN DO YOU HAVE A DRINK CONTAINING ALCOHOL: 2
HOW MANY STANDARD DRINKS CONTAINING ALCOHOL DO YOU HAVE ON A TYPICAL DAY: 1 OR 2
HOW MANY STANDARD DRINKS CONTAINING ALCOHOL DO YOU HAVE ON A TYPICAL DAY: 1
HOW OFTEN DO YOU HAVE SIX OR MORE DRINKS ON ONE OCCASION: 1

## 2025-04-21 ENCOUNTER — TELEMEDICINE (OUTPATIENT)
Dept: PRIMARY CARE CLINIC | Age: 75
End: 2025-04-21
Payer: MEDICARE

## 2025-04-21 DIAGNOSIS — F51.01 PRIMARY INSOMNIA: ICD-10-CM

## 2025-04-21 DIAGNOSIS — Z00.00 MEDICARE ANNUAL WELLNESS VISIT, SUBSEQUENT: Primary | ICD-10-CM

## 2025-04-21 DIAGNOSIS — E11.9 TYPE 2 DIABETES MELLITUS WITHOUT COMPLICATION, WITHOUT LONG-TERM CURRENT USE OF INSULIN (HCC): ICD-10-CM

## 2025-04-21 DIAGNOSIS — C25.9 PRIMARY MALIGNANT NEOPLASM OF PANCREAS (HCC): ICD-10-CM

## 2025-04-21 PROCEDURE — 1123F ACP DISCUSS/DSCN MKR DOCD: CPT | Performed by: FAMILY MEDICINE

## 2025-04-21 PROCEDURE — G0439 PPPS, SUBSEQ VISIT: HCPCS | Performed by: FAMILY MEDICINE

## 2025-04-21 PROCEDURE — 3017F COLORECTAL CA SCREEN DOC REV: CPT | Performed by: FAMILY MEDICINE

## 2025-04-21 PROCEDURE — 3044F HG A1C LEVEL LT 7.0%: CPT | Performed by: FAMILY MEDICINE

## 2025-04-21 NOTE — PATIENT INSTRUCTIONS
LL[employer].   Care instructions adapted under license by [employer]. If you have questions about a medical condition or this instruction, always ask your healthcare professional. [employer], disclaims any warranty or liability for your use of this information.    Personalized Preventive Plan for Mechelle Fleming - 4/21/2025  Medicare offers a range of preventive health benefits. Some of the tests and screenings are paid in full while other may be subject to a deductible, co-insurance, and/or copay.  Some of these benefits include a comprehensive review of your medical history including lifestyle, illnesses that may run in your family, and various assessments and screenings as appropriate.  After reviewing your medical record and screening and assessments performed today your provider may have ordered immunizations, labs, imaging, and/or referrals for you.  A list of these orders (if applicable) as well as your Preventive Care list are included within your After Visit Summary for your review.

## 2025-04-21 NOTE — PROGRESS NOTES
as needed Yes Valarie Rodriguez MD   ondansetron (ZOFRAN) 8 MG tablet Take 1 tablet by mouth every 8 hours as needed for Nausea or Vomiting Yes Valarie Rodriguez MD   Blood Glucose Monitoring Suppl KIT 1 each by Does not apply route daily Dispense according to insurance formulary Yes Kimberly Lee MD   blood glucose test strips (ASCENSIA AUTODISC VI;ONE TOUCH ULTRA TEST VI) strip 1 each by In Vitro route daily Test as directed,Dispense according to insurance formulary Yes Kimberly Lee MD   Lancets MISC 1 each by Does not apply route daily Test as directed, Dispense according to insurance formulary Yes Kimberly Lee MD   buPROPion (WELLBUTRIN XL) 300 MG extended release tablet Take 1 tablet by mouth every morning Yes Kimberly Lee MD   pantoprazole (PROTONIX) 40 MG tablet TAKE 1 TABLET DAILY Yes Kimberly Lee MD   rOPINIRole (REQUIP) 1 MG tablet TAKE 1 TABLET four TIMES DAILY Yes Kimberly Lee MD   levothyroxine (SYNTHROID) 50 MCG tablet Take 1 tablet by mouth daily Yes Kimberly Lee MD   rosuvastatin (CRESTOR) 5 MG tablet Take 1 tablet by mouth nightly Yes Kimberly Lee MD   alendronate (FOSAMAX) 70 MG tablet TAKE 1 TABLET EVERY 7 DAYS Yes German Shaw MD   gabapentin (NEURONTIN) 300 MG capsule Take 1 capsule by mouth in the morning and 1 capsule in the evening. Yes Valarie Rodriguez MD   betamethasone dipropionate 0.05 % ointment as needed Yes ProviderValarie MD   acetaminophen (TYLENOL) 500 MG tablet Take 2 tablets by mouth Yes ProviderValarie MD   Probiotic Product (PROBIOTIC DAILY PO) Take by mouth daily Yes ProviderValarie MD   rizatriptan (MAXALT) 10 MG tablet Take 1 tablet by mouth once as needed for Migraine  Kimberly Lee MD       [facility] (Including outside providers/suppliers regularly involved in providing care):   Patient Care Team:  Kimberly Lee MD as PCP - General (Family Medicine)  Kimberly Lee MD as PCP - Empaneled Provider     Recommendations for

## 2025-04-27 ENCOUNTER — PATIENT MESSAGE (OUTPATIENT)
Dept: PRIMARY CARE CLINIC | Age: 75
End: 2025-04-27

## 2025-04-27 DIAGNOSIS — F51.01 PRIMARY INSOMNIA: Primary | Chronic | ICD-10-CM

## 2025-04-28 RX ORDER — ZOLPIDEM TARTRATE 12.5 MG/1
12.5 TABLET, FILM COATED, EXTENDED RELEASE ORAL NIGHTLY PRN
Qty: 10 TABLET | Refills: 0 | Status: SHIPPED | OUTPATIENT
Start: 2025-04-28 | End: 2025-05-08

## 2025-04-28 NOTE — TELEPHONE ENCOUNTER
Spoke with patient  Sleep improved after stopping zyprexa but still with limited hours of sleep  Will change to ambien cr. 12.5 mg. Oarrs reviewed. #10 sent    Blood sugars have stabilized this week after chemotherapy treatment last week which includes steroids  She feels comfortable decreasing dose of insulin in response to changing blood sugars readings. Should call anytime with questions    Has appointment at [facility] next week  Has not heard results of PET scan - not visible in care everywhere    To schedule a follow up next week

## 2025-05-02 DIAGNOSIS — Z01.84 IMMUNITY STATUS TESTING: ICD-10-CM

## 2025-05-03 LAB
MEV IGG SER QL IA: NORMAL
MUV IGG SER QL IA: NORMAL
RUBV IGG SERPL QL IA: NORMAL
VZV IGG SER QL IA: NORMAL

## 2025-05-05 ENCOUNTER — RESULTS FOLLOW-UP (OUTPATIENT)
Dept: PRIMARY CARE CLINIC | Age: 75
End: 2025-05-05

## 2025-05-07 NOTE — PROGRESS NOTES
Mechelle Fleming (:  1950) is a 75 y.o. female,Established patient, here for evaluation of the following chief complaint(s): Follow-up      ASSESSMENT/PLAN:  1. Type 2 diabetes mellitus with hyperglycemia, with long-term current use of insulin (HCC)  -     Continuous Glucose Sensor (DEXCOM G7 SENSOR) MISC; 1 each by Does not apply route every 10 days, Disp-4 each, R-12Normal  -     insulin glargine (LANTUS SOLOSTAR) 100 UNIT/ML injection pen; Use 20 units nightly, Disp-5 Adjustable Dose Pre-filled Pen Syringe, R-1Normal  2. Primary insomnia  -     zolpidem (AMBIEN) 10 MG tablet; Take 1 tablet by mouth nightly as needed for Sleep for up to 60 days. Max Daily Amount: 10 mg, Disp-30 tablet, R-1Normal  Recommend that she give herself 5 units of insulin now and then increase to 23 units tonight and increase by 3 units if not controlled on this regimen understanding that as the steroids leave her system her blood sugar will start decreasing again and she will decrease the dose at that time.  She does feel comfortable in managing this when  Can also consider change to short acting insulin but will wait for decision on her chemotherapy regimen  Insomnia not controlled on her current medication will change back to the zolpidem 10 mg.    Return in about 4 weeks (around 2025).    SUBJECTIVE/OBJECTIVE:      Pancreatic cancer-went to [facility] and reviewed in detail her PET scan which she had not previously heard the results.  Discussed possible changes she could consider for her treatment include decreasing the dose of her current chemotherapy decreasing the dose versus changing to a different treatment.  Changing treatment does require approval from insurance and is always a week delayed so she went ahead with her chemotherapy regimen this week with a decreased dose.  She is holding off for the next 3 weeks as her granddaughter will be graduating and she will assess her symptoms overall and make a decision during

## 2025-05-08 ENCOUNTER — TELEMEDICINE (OUTPATIENT)
Dept: PRIMARY CARE CLINIC | Age: 75
End: 2025-05-08

## 2025-05-08 DIAGNOSIS — Z79.4 TYPE 2 DIABETES MELLITUS WITH HYPERGLYCEMIA, WITH LONG-TERM CURRENT USE OF INSULIN (HCC): Primary | ICD-10-CM

## 2025-05-08 DIAGNOSIS — E11.65 TYPE 2 DIABETES MELLITUS WITH HYPERGLYCEMIA, WITH LONG-TERM CURRENT USE OF INSULIN (HCC): Primary | ICD-10-CM

## 2025-05-08 DIAGNOSIS — F51.01 PRIMARY INSOMNIA: ICD-10-CM

## 2025-05-08 RX ORDER — POTASSIUM CHLORIDE 1500 MG/1
TABLET, EXTENDED RELEASE ORAL
COMMUNITY
Start: 2025-05-07

## 2025-05-08 RX ORDER — PROMETHAZINE HYDROCHLORIDE 12.5 MG/1
12.5 TABLET ORAL EVERY 6 HOURS PRN
COMMUNITY
Start: 2025-03-06

## 2025-05-08 RX ORDER — INSULIN GLARGINE 100 [IU]/ML
INJECTION, SOLUTION SUBCUTANEOUS
Qty: 5 ADJUSTABLE DOSE PRE-FILLED PEN SYRINGE | Refills: 1 | Status: SHIPPED | OUTPATIENT
Start: 2025-05-08

## 2025-05-08 RX ORDER — ACYCLOVIR 400 MG/1
1 TABLET ORAL
Qty: 4 EACH | Refills: 12 | Status: SHIPPED | OUTPATIENT
Start: 2025-05-08

## 2025-05-08 RX ORDER — ZOLPIDEM TARTRATE 10 MG/1
10 TABLET ORAL NIGHTLY PRN
Qty: 30 TABLET | Refills: 1 | Status: SHIPPED | OUTPATIENT
Start: 2025-05-08 | End: 2025-07-07

## 2025-05-08 RX ORDER — ZOLPIDEM TARTRATE 10 MG/1
10 TABLET ORAL NIGHTLY PRN
Qty: 30 TABLET | Refills: 1 | Status: SHIPPED | OUTPATIENT
Start: 2025-05-08 | End: 2025-05-08 | Stop reason: SDUPTHER

## 2025-05-12 ENCOUNTER — HOSPITAL ENCOUNTER (OUTPATIENT)
Age: 75
Discharge: HOME | End: 2025-05-12
Attending: INTERNAL MEDICINE
Payer: MEDICARE

## 2025-05-12 DIAGNOSIS — C25.0 MALIGNANT NEOPLASM OF HEAD OF PANCREAS (HCC): ICD-10-CM

## 2025-05-12 PROCEDURE — 74177 CT ABD & PELVIS W/CONTRAST: CPT

## 2025-05-12 PROCEDURE — 6360000004 HC RX CONTRAST MEDICATION: Performed by: INTERNAL MEDICINE

## 2025-05-12 RX ORDER — IOPAMIDOL 755 MG/ML
75 INJECTION, SOLUTION INTRAVASCULAR
Status: COMPLETED | OUTPATIENT
Start: 2025-05-12 | End: 2025-05-12

## 2025-05-12 RX ORDER — DIATRIZOATE MEGLUMINE AND DIATRIZOATE SODIUM 660; 100 MG/ML; MG/ML
30 SOLUTION ORAL; RECTAL
Status: DISCONTINUED | OUTPATIENT
Start: 2025-05-12 | End: 2025-05-13 | Stop reason: HOSPADM

## 2025-05-12 RX ADMIN — IOPAMIDOL 75 ML: 755 INJECTION, SOLUTION INTRAVENOUS at 12:15

## 2025-05-12 RX ADMIN — DIATRIZOATE MEGLUMINE AND DIATRIZOATE SODIUM 30 ML: 660; 100 LIQUID ORAL; RECTAL at 12:15

## 2025-06-05 DIAGNOSIS — M81.0 OSTEOPOROSIS, POSTMENOPAUSAL: Primary | ICD-10-CM

## 2025-06-11 DIAGNOSIS — E11.9 TYPE 2 DIABETES MELLITUS WITHOUT COMPLICATION, WITH LONG-TERM CURRENT USE OF INSULIN (HCC): ICD-10-CM

## 2025-06-11 DIAGNOSIS — Z79.4 TYPE 2 DIABETES MELLITUS WITHOUT COMPLICATION, WITH LONG-TERM CURRENT USE OF INSULIN (HCC): ICD-10-CM

## 2025-06-11 RX ORDER — AVOBENZONE, HOMOSALATE, OCTISALATE, OCTOCRYLENE 30; 40; 45; 26 MG/ML; MG/ML; MG/ML; MG/ML
1 CREAM TOPICAL 2 TIMES DAILY
Qty: 200 EACH | Refills: 4 | Status: SHIPPED | OUTPATIENT
Start: 2025-06-11

## 2025-06-11 NOTE — TELEPHONE ENCOUNTER
Medication:   Requested Prescriptions     Pending Prescriptions Disp Refills    blood glucose test strips (ASCENSIA AUTODISC VI;ONE TOUCH ULTRA TEST VI) strip 100 each 4     Si each by In Vitro route daily Test as directed,Dispense according to insurance formulary    Lancets MISC 100 each 4     Si each by Does not apply route daily Test as directed, Dispense according to insurance formulary       Last Filled:  Patient states she tests more than what she us alotted    Patient Phone Number: [phone] (home)     Last appt: 2025   Next appt: Visit date not found    Last Labs DM:   Lab Results   Component Value Date/Time    LABA1C 6.4 2025 10:08 AM

## 2025-06-13 ENCOUNTER — TELEPHONE (OUTPATIENT)
Dept: PRIMARY CARE CLINIC | Age: 75
End: 2025-06-13

## 2025-06-13 NOTE — TELEPHONE ENCOUNTER
Pt states that [facility] pharmacy has been trying to reach the office to get information needed to fill diabetic supplies.  Pt would like office to reach out to pharmacy to see what information is needed.  Pt mentioned they need last A1c, last office notes, and why she needs to test.      [facility].  [phone]

## 2025-06-13 NOTE — TELEPHONE ENCOUNTER
Form received at [facility] on 9/28/2017.    Please note that it takes 7-10 business days for completion.     Authorization on file.     Pt mentioned they- [employer] need last A1c, last office notes, and why she needs to test.

## 2025-06-16 NOTE — TELEPHONE ENCOUNTER
Pt checking on status of prior auth.  Pt is now out of supplies.      Pt would like a call with an update.

## 2025-06-17 NOTE — TELEPHONE ENCOUNTER
\"Blank\" Diabetic Standard Written Order / [facility]     PCP: Originals Scanned to Media Mgr. and placed in MA Folder for further processing.  Please route message back to department pool and return original paperwork to [facility] place paperwork in Reg folder for fax/scanning.

## 2025-06-17 NOTE — TELEPHONE ENCOUNTER
\"Completed\" Diabetic Standard Written Order / [facility]     Completed  Forms have been signed, scanned to , and Faxed to [phone].    Confirmation fax received       No further action Required.

## 2025-06-17 NOTE — TELEPHONE ENCOUNTER
Pt states that we are not to contact the pharmacy we are to contact their division for medicare [phone].  Pt spoke to deb Gabriel at this number.  Nery informed pt she would refax form.  Form currently not received.

## 2025-06-23 ENCOUNTER — TELEPHONE (OUTPATIENT)
Dept: PRIMARY CARE CLINIC | Age: 75
End: 2025-06-23

## 2025-06-23 NOTE — TELEPHONE ENCOUNTER
\"Blank\" Physician's Rx for Diabetic Supplies / [facility]    PCP: Originals Scanned to Media Mgr. and placed in MA Folder for further processing.  Please route message back to department pool and return original paperwork to [facility] place paperwork in Reg folder for fax/scanning.

## 2025-06-23 NOTE — TELEPHONE ENCOUNTER
\"Blank\" Continuous Glucose Monitoring & Supplies Order Form / [facility]    Orders scanned to Media, Originals on PCP desk for signing.     PCP: Please route message back to department pool and return original paperwork to MA to process / fax

## 2025-06-24 PROBLEM — Z79.4 TYPE 2 DIABETES MELLITUS WITHOUT COMPLICATION, WITH LONG-TERM CURRENT USE OF INSULIN (HCC): Status: ACTIVE | Noted: 2025-03-27

## 2025-06-24 NOTE — TELEPHONE ENCOUNTER
\"Complete\" Physician's Rx for Diabetic Supplies / [facility]     Form scanned into media, attached to encounter, and faxed to facility.     No further action is needed for this encounter.

## 2025-06-24 NOTE — TELEPHONE ENCOUNTER
\"Complete\" Continuous Glucose Monitoring & Supplies Order Form / [facility]     Form scanned into media, attached to encounter, and faxed to facility.     No further action is needed for this encounter.

## 2025-06-30 ENCOUNTER — OFFICE VISIT (OUTPATIENT)
Dept: PRIMARY CARE CLINIC | Age: 75
End: 2025-06-30

## 2025-06-30 ENCOUNTER — TELEPHONE (OUTPATIENT)
Dept: PRIMARY CARE CLINIC | Age: 75
End: 2025-06-30

## 2025-06-30 VITALS
RESPIRATION RATE: 12 BRPM | SYSTOLIC BLOOD PRESSURE: 124 MMHG | OXYGEN SATURATION: 94 % | BODY MASS INDEX: 27.44 KG/M2 | TEMPERATURE: 97.5 F | DIASTOLIC BLOOD PRESSURE: 78 MMHG | WEIGHT: 175.2 LBS | HEART RATE: 83 BPM

## 2025-06-30 DIAGNOSIS — Z79.4 TYPE 2 DIABETES MELLITUS WITHOUT COMPLICATION, WITH LONG-TERM CURRENT USE OF INSULIN (HCC): Primary | ICD-10-CM

## 2025-06-30 DIAGNOSIS — F51.01 PRIMARY INSOMNIA: ICD-10-CM

## 2025-06-30 DIAGNOSIS — F51.01 PRIMARY INSOMNIA: Chronic | ICD-10-CM

## 2025-06-30 DIAGNOSIS — E11.9 TYPE 2 DIABETES MELLITUS WITHOUT COMPLICATION, WITH LONG-TERM CURRENT USE OF INSULIN (HCC): Primary | ICD-10-CM

## 2025-06-30 LAB — HBA1C MFR BLD: 6 %

## 2025-06-30 RX ORDER — FUROSEMIDE 40 MG/1
40 TABLET ORAL EVERY MORNING
COMMUNITY
Start: 2025-06-02

## 2025-06-30 RX ORDER — ZOLPIDEM TARTRATE 10 MG/1
10 TABLET ORAL NIGHTLY PRN
Qty: 30 TABLET | Refills: 1 | Status: CANCELLED | OUTPATIENT
Start: 2025-06-30 | End: 2025-08-29

## 2025-06-30 RX ORDER — ZOLPIDEM TARTRATE 10 MG/1
10 TABLET ORAL NIGHTLY PRN
Qty: 90 TABLET | Refills: 0 | Status: SHIPPED | OUTPATIENT
Start: 2025-06-30 | End: 2025-09-28

## 2025-06-30 NOTE — TELEPHONE ENCOUNTER
Patient calling into office requesting a medication refill of   zolpidem (AMBIEN) 10 MG tablet. Patient requesting a 90-day supply to [facility].

## 2025-06-30 NOTE — TELEPHONE ENCOUNTER
.  Medication:   Requested Prescriptions     Pending Prescriptions Disp Refills    zolpidem (AMBIEN) 10 MG tablet 30 tablet 1     Sig: Take 1 tablet by mouth nightly as needed for Sleep for up to 60 days. Max Daily Amount: 10 mg        Last Filled:  [id number] #[id number] x1 Patient requesting #90    Patient Phone Number: [phone] (home)     Last appt: 5/8/2025   Next appt: Return in about 4 weeks (around 6/5/2025).     Last OARRS:        No data to display

## 2025-06-30 NOTE — PROGRESS NOTES
PROGRESS NOTE  Date of Service:  6/30/2025    SUBJECTIVE:  Patient ID: Mechelle Fleming is a 75 y.o. female         PLAN:   1. Primary insomnia  2. Type 2 diabetes mellitus without complication, with long-term current use of insulin ([facility])  -     POCT glycosylated hemoglobin (Hb A1C)     Assessment & Plan  1. Nausea.  - Experiences nausea primarily on the second day after chemotherapy when the pump is removed.  - Sancuso patch was suggested but not used due to cost; medical marijuana tried without success.  - Continues to use Zofran proactively but faces an 8-hour gap between doses.  - Advised to take Zofran at the onset of nausea rather than preemptively to see if it provides better relief.    2. Diabetes Mellitus.  - Blood glucose levels are generally well-controlled, ranging from 80s to 150s, with fasting levels in the 90s or 110s.  - Experienced a spike to 251 during chemotherapy.  - Currently on a basal insulin regimen, taking 16 units regularly, with adjustments made during chemotherapy.  - Use of fast-acting insulin discussed but not reluctant to usedue to the risk of hypoglycemia; will continue with current basal insulin regimen and adjust as needed during chemotherapy.    3. insomnia  - Currently taking Ambien 10 mg for sleep, which is effective without side effects.  - Prescription for a 90-day supply of Ambien 10 mg will be sent to mail order pharmacy.  - Has about 9 days left of current supply and a few 5 mg doses that can be combined if needed.  Oarrs reviewed    4. Restless legs syndrome.  - Reports occasional episodes of restless legs syndrome, which are generally controlled.  - No specific treatment changes discussed.       Follow up with regular follow up appointment in august          HPI:     History of Present Illness  The patient presents for evaluation of diabetes mellitus, and sleep issues.    She is undergoing chemotherapy, with her most recent session occurring today. Her previous

## 2025-07-08 ENCOUNTER — OFFICE VISIT (OUTPATIENT)
Dept: ENDOCRINOLOGY | Age: 75
End: 2025-07-08
Payer: MEDICARE

## 2025-07-08 ENCOUNTER — HOSPITAL ENCOUNTER (OUTPATIENT)
Dept: GENERAL RADIOLOGY | Age: 75
Discharge: HOME | End: 2025-07-08
Payer: MEDICARE

## 2025-07-08 VITALS — BODY MASS INDEX: 27.77 KG/M2 | WEIGHT: 172.8 LBS | HEIGHT: 66 IN

## 2025-07-08 DIAGNOSIS — Z51.81 MEDICATION MONITORING ENCOUNTER: ICD-10-CM

## 2025-07-08 DIAGNOSIS — M80.08XD VERTEBRAL FRACTURE, OSTEOPOROTIC, WITH ROUTINE HEALING, SUBSEQUENT ENCOUNTER: ICD-10-CM

## 2025-07-08 DIAGNOSIS — M81.0 OSTEOPOROSIS, POSTMENOPAUSAL: Primary | ICD-10-CM

## 2025-07-08 DIAGNOSIS — M81.0 OSTEOPOROSIS, POSTMENOPAUSAL: ICD-10-CM

## 2025-07-08 PROCEDURE — G9899 SCRN MAM PERF RSLTS DOC: HCPCS | Performed by: INTERNAL MEDICINE

## 2025-07-08 PROCEDURE — 3017F COLORECTAL CA SCREEN DOC REV: CPT | Performed by: INTERNAL MEDICINE

## 2025-07-08 PROCEDURE — 99214 OFFICE O/P EST MOD 30 MIN: CPT | Performed by: INTERNAL MEDICINE

## 2025-07-08 PROCEDURE — 77080 DXA BONE DENSITY AXIAL: CPT | Performed by: INTERNAL MEDICINE

## 2025-07-08 PROCEDURE — G8428 CUR MEDS NOT DOCUMENT: HCPCS | Performed by: INTERNAL MEDICINE

## 2025-07-08 PROCEDURE — 77080 DXA BONE DENSITY AXIAL: CPT

## 2025-07-08 PROCEDURE — 1090F PRES/ABSN URINE INCON ASSESS: CPT | Performed by: INTERNAL MEDICINE

## 2025-07-08 PROCEDURE — 1123F ACP DISCUSS/DSCN MKR DOCD: CPT | Performed by: INTERNAL MEDICINE

## 2025-07-08 PROCEDURE — G8399 PT W/DXA RESULTS DOCUMENT: HCPCS | Performed by: INTERNAL MEDICINE

## 2025-07-08 PROCEDURE — G2211 COMPLEX E/M VISIT ADD ON: HCPCS | Performed by: INTERNAL MEDICINE

## 2025-07-08 PROCEDURE — 1036F TOBACCO NON-USER: CPT | Performed by: INTERNAL MEDICINE

## 2025-07-08 PROCEDURE — G8417 CALC BMI ABV UP PARAM F/U: HCPCS | Performed by: INTERNAL MEDICINE

## 2025-07-08 NOTE — PROGRESS NOTES
to [facility].     FOR FULL DETAILS OF FAMILY HISTORY, PAST MEDICAL AND SURGICAL HISTORY, SOCIAL HISTORY, SEE PATIENT QUESTIONNAIRE OF TODAY'S DATE.    PHYSICAL EXAMINATION.   GENERAL.  Well-nourished, well-developed, normally proportioned adult.   MENTAL STATUS. Pleasant mood.  Oriented to time, place, and person.  ORAL. Teeth appear to be in good condition.  MUSCULOSKELETAL.  Spinal contours are normal.  No spine tenderness to palpation or percussion.   One finger space between ribs and pelvis.  Gait steady without assistance.   NEUROLOGICAL. Able to rise from chair without using arms. No apparent motor or sensory deficit. Coordination appears normal     BONE DENSITY.  Most recent done here using Hologic equipment.     T-scores   Initial study: 12/13/2018 L1-L2 -0.9 right fem. neck -0.5   Current study: 07/08/2025 L1-L2 +0.2 right fem. neck -0.9     The table below shows bone mineral density (grams/cm2), the appropriate measure for comparing serial scans. A significant “increase” or “decrease” is based on precision studies done at our center according to the ISCD protocol with a least significant change of 0.030 g/cm2.     PA spine Proximal Femur (right)   Date L1-L2 Fem. neck Trochanter Total hip   12/13/2018 0.881 0.794 --- 0.887   02/22/2021 0.800 0.787 --- 0.908   01/24/2023 0.887 0.773 0.675 0.861   07/02/2024 1.000 0.668 07/08/2025 1.006 0.747 0.651 0.844   Results that appear inconsistent are lined through     IMPRESSION:  BONE DENSITY IS NOW NORMAL IN THE SPINE AND RIGHT HIP; HOWEVER, BECAUSE HER VERTEBRAL FRACTURE HISTORY, THE CORRECT CLINICAL DIAGNOSIS IS “OSTEOPOROSIS.”  SINCE THE LAST DXA, BMD DID NOT CHANGE SIGNIFICANTLY IN THE SPINE OR TROCHANTER; IT IS LOWER IN THE FEMORAL NECK AND TOTAL HIP BUT SIMILAR NOW TO 2023.        Labs: 11/2021 Ca 9.8 Cr 0.6. 09/2022 Ca 10.2 Cr 0.5.09/2023 Ca 10.5 Cr 0.6. 04/2025 Ca 9.6 Cr 0.5.  Imaging: DXA printouts reviewed.    ASSESSMENT.  Postmenopausal

## 2025-07-21 ENCOUNTER — OFFICE VISIT (OUTPATIENT)
Dept: PRIMARY CARE CLINIC | Age: 75
End: 2025-07-21

## 2025-07-21 VITALS
DIASTOLIC BLOOD PRESSURE: 76 MMHG | WEIGHT: 169.8 LBS | RESPIRATION RATE: 18 BRPM | HEART RATE: 75 BPM | TEMPERATURE: 97.6 F | BODY MASS INDEX: 27.49 KG/M2 | OXYGEN SATURATION: 97 % | SYSTOLIC BLOOD PRESSURE: 118 MMHG

## 2025-07-21 DIAGNOSIS — E11.9 TYPE 2 DIABETES MELLITUS WITHOUT COMPLICATION, WITH LONG-TERM CURRENT USE OF INSULIN (HCC): Primary | ICD-10-CM

## 2025-07-21 DIAGNOSIS — I87.2 STASIS DERMATITIS: ICD-10-CM

## 2025-07-21 DIAGNOSIS — Z79.4 TYPE 2 DIABETES MELLITUS WITHOUT COMPLICATION, WITH LONG-TERM CURRENT USE OF INSULIN (HCC): Primary | ICD-10-CM

## 2025-07-21 RX ORDER — INSULIN LISPRO 100 [IU]/ML
INJECTION, SOLUTION INTRAVENOUS; SUBCUTANEOUS
Qty: 5 ADJUSTABLE DOSE PRE-FILLED PEN SYRINGE | Refills: 1 | Status: SHIPPED | OUTPATIENT
Start: 2025-07-21

## 2025-07-21 RX ORDER — TRIAMCINOLONE ACETONIDE 5 MG/G
CREAM TOPICAL
Qty: 30 G | Refills: 1 | Status: SHIPPED | OUTPATIENT
Start: 2025-07-21

## 2025-07-21 NOTE — PATIENT INSTRUCTIONS
IF BLOOD GLUCOSE IS   120--150  TAKE 0 UNITS                                               151--180  TAKE 2 UNITS                                             181--210   TAKE 4 UNITS                                               211- 240  TAKE 6 UNITS                                             241--270   TAKE 8 UNITS                                              271--300   TAKE 10 UNITS                                              301---330  TAKE 12 UNITS                                              331---360  TAKE 14 UNITS                                               >360        TAKE 16 UNITS

## 2025-07-21 NOTE — PROGRESS NOTES
PROGRESS NOTE  Date of Service:  7/21/2025    SUBJECTIVE:  Patient ID: Mechelle Fleming is a 75 y.o. female    ASSESSMENT  1. Type 2 diabetes mellitus without complication, with long-term current use of insulin ([facility])    2. Stasis dermatitis        PLAN:   1. Type 2 diabetes mellitus without complication, with long-term current use of insulin ([facility])  -     Glucagon 3 MG/DOSE POWD; 3 mg into single nostril with hypoglycemic episode, if no response may repeat in 15 minutes with a new device, Disp-2 each, R-1Normal  -     insulin lispro, 1 Unit Dial, (HUMALOG KWIKPEN) 100 UNIT/ML SOPN; Inject insulin based on sliding scale directions three times daily, Disp-5 Adjustable Dose Pre-filled Pen Syringe, R-1Normal  -     Insulin Pen Needle 32G X 4 MM MISC; DAILY Starting Mon 7/21/2025, Disp-100 each, R-3, Normal  2. Stasis dermatitis  -     triamcinolone (ARISTOCORT) 0.5 % cream; Apply topically 3 times daily., Disp-30 g, R-1, Normal     Assessment & Plan  1. Diabetes mellitus.  - Her blood glucose levels have been fluctuating significantly, likely due to her ongoing chemotherapy and steroid treatment. She experienced a spike in blood glucose levels during her recent treatment, reaching over 400 mg/dL.  - Blood glucose levels have been monitored using a continuous glucose monitor (CGM), with recent readings showing a range from 67 mg/dL to 287 mg/dL.  - Discussed the use of fast-acting insulin on a sliding scale basis and the importance of monitoring blood glucose levels closely. Explained the risk of diabetic ketoacidosis and the need for emergency care if blood glucose levels remain above 400 mg/dL after administering insulin.  Explained the risk of hypoglycemic episodes with overcorrection with fasting blood sugar and basal insulin and continuing close monitoring is required.  - Prescribed glucagon nasal powder for emergency use if blood glucose levels drop too low. Advised to purchase glucose tablets for immediate

## 2025-08-03 ENCOUNTER — PATIENT MESSAGE (OUTPATIENT)
Dept: PRIMARY CARE CLINIC | Age: 75
End: 2025-08-03

## 2025-08-04 ENCOUNTER — TELEPHONE (OUTPATIENT)
Dept: PRIMARY CARE CLINIC | Age: 75
End: 2025-08-04

## 2025-08-04 DIAGNOSIS — E11.9 TYPE 2 DIABETES MELLITUS WITHOUT COMPLICATION, WITH LONG-TERM CURRENT USE OF INSULIN (HCC): ICD-10-CM

## 2025-08-04 DIAGNOSIS — M81.0 OSTEOPOROSIS, POSTMENOPAUSAL: ICD-10-CM

## 2025-08-04 DIAGNOSIS — Z79.4 TYPE 2 DIABETES MELLITUS WITHOUT COMPLICATION, WITH LONG-TERM CURRENT USE OF INSULIN (HCC): ICD-10-CM

## 2025-08-04 RX ORDER — ALENDRONATE SODIUM 70 MG/1
70 TABLET ORAL
Qty: 12 TABLET | Refills: 4 | Status: SHIPPED | OUTPATIENT
Start: 2025-08-04

## 2025-08-04 RX ORDER — INSULIN LISPRO 100 [IU]/ML
INJECTION, SOLUTION INTRAVENOUS; SUBCUTANEOUS
Qty: 5 ADJUSTABLE DOSE PRE-FILLED PEN SYRINGE | Refills: 1 | Status: SHIPPED | OUTPATIENT
Start: 2025-08-04

## 2025-08-14 ENCOUNTER — OFFICE VISIT (OUTPATIENT)
Dept: PRIMARY CARE CLINIC | Age: 75
End: 2025-08-14

## 2025-08-14 VITALS
HEIGHT: 65 IN | OXYGEN SATURATION: 100 % | HEART RATE: 73 BPM | RESPIRATION RATE: 16 BRPM | TEMPERATURE: 98 F | BODY MASS INDEX: 27.76 KG/M2 | SYSTOLIC BLOOD PRESSURE: 112 MMHG | DIASTOLIC BLOOD PRESSURE: 62 MMHG | WEIGHT: 166.6 LBS

## 2025-08-14 DIAGNOSIS — E11.9 TYPE 2 DIABETES MELLITUS WITHOUT COMPLICATION, WITH LONG-TERM CURRENT USE OF INSULIN (HCC): Primary | ICD-10-CM

## 2025-08-14 DIAGNOSIS — F33.41 RECURRENT MAJOR DEPRESSIVE DISORDER, IN PARTIAL REMISSION: ICD-10-CM

## 2025-08-14 DIAGNOSIS — E03.9 ACQUIRED HYPOTHYROIDISM: ICD-10-CM

## 2025-08-14 DIAGNOSIS — G43.109 MIGRAINE WITH AURA AND WITHOUT STATUS MIGRAINOSUS, NOT INTRACTABLE: ICD-10-CM

## 2025-08-14 DIAGNOSIS — G25.81 RESTLESS LEG SYNDROME: ICD-10-CM

## 2025-08-14 DIAGNOSIS — I87.2 STASIS DERMATITIS: ICD-10-CM

## 2025-08-14 DIAGNOSIS — Z79.4 TYPE 2 DIABETES MELLITUS WITHOUT COMPLICATION, WITH LONG-TERM CURRENT USE OF INSULIN (HCC): Primary | ICD-10-CM

## 2025-08-14 DIAGNOSIS — E78.00 PURE HYPERCHOLESTEROLEMIA: ICD-10-CM

## 2025-08-14 DIAGNOSIS — F51.01 PRIMARY INSOMNIA: Chronic | ICD-10-CM

## 2025-08-14 DIAGNOSIS — M81.0 OSTEOPOROSIS, POSTMENOPAUSAL: ICD-10-CM

## 2025-08-14 DIAGNOSIS — R10.13 DYSPEPSIA: ICD-10-CM

## 2025-08-14 LAB
25(OH)D3 SERPL-MCNC: 69.1 NG/ML
ALBUMIN SERPL-MCNC: 4.3 G/DL (ref 3.4–5)
ALBUMIN/GLOB SERPL: 1.5 {RATIO} (ref 1.1–2.2)
ALP SERPL-CCNC: 307 U/L (ref 40–129)
ALT SERPL-CCNC: 47 U/L (ref 10–40)
ANION GAP SERPL CALCULATED.3IONS-SCNC: 13 MMOL/L (ref 3–16)
AST SERPL-CCNC: 48 U/L (ref 15–37)
BILIRUB SERPL-MCNC: 0.6 MG/DL (ref 0–1)
BUN SERPL-MCNC: 13 MG/DL (ref 7–20)
CALCIUM SERPL-MCNC: 10 MG/DL (ref 8.3–10.6)
CHLORIDE SERPL-SCNC: 102 MMOL/L (ref 99–110)
CHOLEST SERPL-MCNC: 156 MG/DL (ref 0–199)
CO2 SERPL-SCNC: 27 MMOL/L (ref 21–32)
CREAT SERPL-MCNC: 0.6 MG/DL (ref 0.6–1.2)
FOLATE SERPL-MCNC: >40 NG/ML (ref 4.78–24.2)
GFR SERPLBLD CREATININE-BSD FMLA CKD-EPI: >90 ML/MIN/{1.73_M2}
GLUCOSE SERPL-MCNC: 99 MG/DL (ref 70–99)
HDLC SERPL-MCNC: 77 MG/DL (ref 40–60)
LDLC SERPL CALC-MCNC: 69 MG/DL
MAGNESIUM SERPL-MCNC: 1.64 MG/DL (ref 1.8–2.4)
POTASSIUM SERPL-SCNC: 4.5 MMOL/L (ref 3.5–5.1)
PROT SERPL-MCNC: 7.2 G/DL (ref 6.4–8.2)
SODIUM SERPL-SCNC: 142 MMOL/L (ref 136–145)
TRIGL SERPL-MCNC: 48 MG/DL (ref 0–150)
TSH SERPL DL<=0.005 MIU/L-ACNC: 2.06 UIU/ML (ref 0.27–4.2)
VIT B12 SERPL-MCNC: >4000 PG/ML (ref 211–911)
VLDLC SERPL CALC-MCNC: 10 MG/DL

## 2025-08-14 RX ORDER — ROPINIROLE 1 MG/1
TABLET, FILM COATED ORAL
Qty: 360 TABLET | Refills: 1 | Status: SHIPPED | OUTPATIENT
Start: 2025-08-14

## 2025-08-14 RX ORDER — PANTOPRAZOLE SODIUM 40 MG/1
40 TABLET, DELAYED RELEASE ORAL DAILY
Qty: 90 TABLET | Refills: 1 | Status: SHIPPED | OUTPATIENT
Start: 2025-08-14

## 2025-08-14 RX ORDER — BUPROPION HYDROCHLORIDE 300 MG/1
300 TABLET ORAL EVERY MORNING
Qty: 90 TABLET | Refills: 1 | Status: SHIPPED | OUTPATIENT
Start: 2025-08-14

## 2025-08-14 RX ORDER — ZOLPIDEM TARTRATE 10 MG/1
10 TABLET ORAL NIGHTLY PRN
Qty: 90 TABLET | Refills: 0 | Status: SHIPPED | OUTPATIENT
Start: 2025-08-14 | End: 2025-11-12

## 2025-08-14 RX ORDER — RIZATRIPTAN BENZOATE 10 MG/1
10 TABLET ORAL
Qty: 30 TABLET | Refills: 1 | Status: SHIPPED | OUTPATIENT
Start: 2025-08-14

## 2025-08-14 ASSESSMENT — PATIENT HEALTH QUESTIONNAIRE - PHQ9
3. TROUBLE FALLING OR STAYING ASLEEP: NEARLY EVERY DAY
5. POOR APPETITE OR OVEREATING: SEVERAL DAYS
SUM OF ALL RESPONSES TO PHQ QUESTIONS 1-9: 9
10. IF YOU CHECKED OFF ANY PROBLEMS, HOW DIFFICULT HAVE THESE PROBLEMS MADE IT FOR YOU TO DO YOUR WORK, TAKE CARE OF THINGS AT HOME, OR GET ALONG WITH OTHER PEOPLE: NOT DIFFICULT AT ALL
7. TROUBLE CONCENTRATING ON THINGS, SUCH AS READING THE NEWSPAPER OR WATCHING TELEVISION: NOT AT ALL
SUM OF ALL RESPONSES TO PHQ QUESTIONS 1-9: 9
SUM OF ALL RESPONSES TO PHQ QUESTIONS 1-9: 9
1. LITTLE INTEREST OR PLEASURE IN DOING THINGS: SEVERAL DAYS
8. MOVING OR SPEAKING SO SLOWLY THAT OTHER PEOPLE COULD HAVE NOTICED. OR THE OPPOSITE, BEING SO FIGETY OR RESTLESS THAT YOU HAVE BEEN MOVING AROUND A LOT MORE THAN USUAL: NOT AT ALL
4. FEELING TIRED OR HAVING LITTLE ENERGY: NEARLY EVERY DAY
6. FEELING BAD ABOUT YOURSELF - OR THAT YOU ARE A FAILURE OR HAVE LET YOURSELF OR YOUR FAMILY DOWN: NOT AT ALL
2. FEELING DOWN, DEPRESSED OR HOPELESS: SEVERAL DAYS
9. THOUGHTS THAT YOU WOULD BE BETTER OFF DEAD, OR OF HURTING YOURSELF: NOT AT ALL
SUM OF ALL RESPONSES TO PHQ QUESTIONS 1-9: 9

## 2025-08-18 RX ORDER — TRIAMCINOLONE ACETONIDE 5 MG/G
CREAM TOPICAL
Qty: 30 G | Refills: 1 | Status: SHIPPED | OUTPATIENT
Start: 2025-08-18

## 2025-08-20 DIAGNOSIS — E11.65 TYPE 2 DIABETES MELLITUS WITH HYPERGLYCEMIA, WITH LONG-TERM CURRENT USE OF INSULIN (HCC): ICD-10-CM

## 2025-08-20 DIAGNOSIS — Z79.4 TYPE 2 DIABETES MELLITUS WITH HYPERGLYCEMIA, WITH LONG-TERM CURRENT USE OF INSULIN (HCC): ICD-10-CM

## 2025-08-20 DIAGNOSIS — Z79.4 TYPE 2 DIABETES MELLITUS WITHOUT COMPLICATION, WITH LONG-TERM CURRENT USE OF INSULIN (HCC): ICD-10-CM

## 2025-08-20 DIAGNOSIS — E11.9 TYPE 2 DIABETES MELLITUS WITHOUT COMPLICATION, WITH LONG-TERM CURRENT USE OF INSULIN (HCC): ICD-10-CM

## 2025-08-20 RX ORDER — INSULIN LISPRO 100 [IU]/ML
INJECTION, SOLUTION INTRAVENOUS; SUBCUTANEOUS
Qty: 5 ADJUSTABLE DOSE PRE-FILLED PEN SYRINGE | Refills: 1 | Status: SHIPPED | OUTPATIENT
Start: 2025-08-20

## 2025-08-20 RX ORDER — INSULIN GLARGINE 100 [IU]/ML
INJECTION, SOLUTION SUBCUTANEOUS
Qty: 5 ADJUSTABLE DOSE PRE-FILLED PEN SYRINGE | Refills: 1 | Status: SHIPPED | OUTPATIENT
Start: 2025-08-20

## 2025-08-26 ENCOUNTER — PATIENT MESSAGE (OUTPATIENT)
Dept: PRIMARY CARE CLINIC | Age: 75
End: 2025-08-26

## 2025-08-26 DIAGNOSIS — Z79.4 TYPE 2 DIABETES MELLITUS WITH HYPERGLYCEMIA, WITH LONG-TERM CURRENT USE OF INSULIN (HCC): ICD-10-CM

## 2025-08-26 DIAGNOSIS — E03.9 ACQUIRED HYPOTHYROIDISM: Primary | ICD-10-CM

## 2025-08-26 DIAGNOSIS — E11.65 TYPE 2 DIABETES MELLITUS WITH HYPERGLYCEMIA, WITH LONG-TERM CURRENT USE OF INSULIN (HCC): ICD-10-CM

## 2025-08-26 RX ORDER — LEVOTHYROXINE SODIUM 50 UG/1
50 TABLET ORAL DAILY
Qty: 90 TABLET | Refills: 1 | Status: SHIPPED | OUTPATIENT
Start: 2025-08-26

## 2025-08-26 RX ORDER — INSULIN LISPRO 100 [IU]/ML
INJECTION, SOLUTION INTRAVENOUS; SUBCUTANEOUS
Qty: 5 ADJUSTABLE DOSE PRE-FILLED PEN SYRINGE | Refills: 1 | Status: SHIPPED | OUTPATIENT
Start: 2025-08-26

## 2025-08-26 RX ORDER — INSULIN GLARGINE 100 [IU]/ML
INJECTION, SOLUTION SUBCUTANEOUS
Qty: 5 ADJUSTABLE DOSE PRE-FILLED PEN SYRINGE | Refills: 1 | Status: SHIPPED | OUTPATIENT
Start: 2025-08-26

## (undated) DEVICE — SUTURE MONOCRYL + SZ 4-0 L27IN ABSRB UD L19MM PS-2 3/8 CIR MCP426H

## (undated) DEVICE — GENERAL LAPAROSCOPIC: Brand: MEDLINE INDUSTRIES, INC.

## (undated) DEVICE — TROCAR: Brand: KII FIOS FIRST ENTRY

## (undated) DEVICE — TRAP FLUID

## (undated) DEVICE — BLADE ES ELASTOMERIC COAT INSUL DURABLE BEND UPTO 90DEG

## (undated) DEVICE — BITE BLOCK ENDOSCP AD 60 FR W/ ADJ STRP PLAS GRN BLOX

## (undated) DEVICE — SCANLAN® SUTURE BOOT™ INSTRUMENT JAW COVERS - ORIGINAL YELLOW, STANDARD PKG (5 PAIR/CARTRIDGE, 1 CARTRIDGE/PKG): Brand: SCANLAN® SUTURE BOOT™ INSTRUMENT JAW COVERS

## (undated) DEVICE — GLOVE SURG SZ 7 L12IN FNGR THK75MIL WHT LTX POLYMER BEAD

## (undated) DEVICE — SUTURE VICRYL + SZ 3-0 L18IN ABSRB UD SH 1/2 CIR TAPERCUT NDL VCP864D

## (undated) DEVICE — ENDOSCOPY KIT: Brand: MEDLINE INDUSTRIES, INC.

## (undated) DEVICE — ENDOSCOPIC ULTRASOUND FINE NEEDLE BIOPSY (FNB) DEVICE: Brand: ACQUIRE

## (undated) DEVICE — PORT INSERTION: Brand: MEDLINE INDUSTRIES, INC.

## (undated) DEVICE — BLADE,CARBON-STEEL,10,STRL,DISPOSABLE,TB: Brand: MEDLINE

## (undated) DEVICE — GOWN,SIRUS,POLYRNF,BRTHSLV,LG,30/CS: Brand: MEDLINE

## (undated) DEVICE — APPLICATOR MEDICATED 26 CC SOLUTION HI LT ORNG CHLORAPREP

## (undated) DEVICE — TOWEL,STOP FLAG GOLD N-W: Brand: MEDLINE

## (undated) DEVICE — KIT,ANTI FOG,W/SPONGE & FLUID,SOFT PACK: Brand: MEDLINE

## (undated) DEVICE — SUTURE ETHIBOND 2-0 L30IN NONABSORBABLE GRN L22MM SH-1 1/2 MX763

## (undated) DEVICE — 3M™ IOBAN™ 2 ANTIMICROBIAL INCISE DRAPE 6640EZ: Brand: IOBAN™ 2

## (undated) DEVICE — LIQUIBAND RAPID ADHESIVE 36/CS 0.8ML: Brand: MEDLINE

## (undated) DEVICE — GLOVE SURG SZ 7 L12IN FNGR THK79MIL GRN LTX FREE

## (undated) DEVICE — NEPTUNE E-SEP SMOKE EVACUATION PENCIL, COATED, 70MM BLADE, PUSH BUTTON SWITCH: Brand: NEPTUNE E-SEP

## (undated) DEVICE — Device